# Patient Record
Sex: FEMALE | Race: WHITE | NOT HISPANIC OR LATINO | Employment: PART TIME | URBAN - METROPOLITAN AREA
[De-identification: names, ages, dates, MRNs, and addresses within clinical notes are randomized per-mention and may not be internally consistent; named-entity substitution may affect disease eponyms.]

---

## 2017-03-21 ENCOUNTER — GENERIC CONVERSION - ENCOUNTER (OUTPATIENT)
Dept: OTHER | Facility: OTHER | Age: 22
End: 2017-03-21

## 2017-03-21 ENCOUNTER — ALLSCRIPTS OFFICE VISIT (OUTPATIENT)
Dept: OTHER | Facility: OTHER | Age: 22
End: 2017-03-21

## 2017-03-23 LAB
A. VAGINALIS BY PCR (HISTORICAL): NOT DETECTED
A.VAGINALIS LOG (CELL/ML) (HISTORICAL): <3.25
BVAB 2 (HISTORICAL): NOT DETECTED
C. ALBICANS, DNA OR PCR (HISTORICAL): NOT DETECTED
CANDIDA GENUS (HISTORICAL): NOT DETECTED
GARDNERELLA BY MOL. METHOD (HISTORICAL): <3.25
GARDNERELLA BY MOL. METHOD (HISTORICAL): NOT DETECTED
LACTOBACILLUS (SPECIES) (HISTORICAL): DETECTED
LACTOBACILLUS (SPECIES) (HISTORICAL): NORMAL
MEGASPHAERA TYPE 1 (HISTORICAL): NOT DETECTED
TRICHOMONAS (HISTORICAL): NOT DETECTED

## 2017-04-21 ENCOUNTER — ALLSCRIPTS OFFICE VISIT (OUTPATIENT)
Dept: OTHER | Facility: OTHER | Age: 22
End: 2017-04-21

## 2017-05-10 ENCOUNTER — LAB REQUISITION (OUTPATIENT)
Dept: LAB | Facility: HOSPITAL | Age: 22
End: 2017-05-10
Payer: COMMERCIAL

## 2017-05-10 ENCOUNTER — GENERIC CONVERSION - ENCOUNTER (OUTPATIENT)
Dept: OTHER | Facility: OTHER | Age: 22
End: 2017-05-10

## 2017-05-10 ENCOUNTER — ALLSCRIPTS OFFICE VISIT (OUTPATIENT)
Dept: OTHER | Facility: OTHER | Age: 22
End: 2017-05-10

## 2017-05-10 DIAGNOSIS — R10.2 PELVIC AND PERINEAL PAIN: ICD-10-CM

## 2017-05-10 PROCEDURE — 87491 CHLMYD TRACH DNA AMP PROBE: CPT | Performed by: ADVANCED PRACTICE MIDWIFE

## 2017-05-10 PROCEDURE — 87591 N.GONORRHOEAE DNA AMP PROB: CPT | Performed by: ADVANCED PRACTICE MIDWIFE

## 2017-05-11 ENCOUNTER — LAB REQUISITION (OUTPATIENT)
Dept: LAB | Facility: HOSPITAL | Age: 22
End: 2017-05-11
Payer: COMMERCIAL

## 2017-05-11 ENCOUNTER — HOSPITAL ENCOUNTER (OUTPATIENT)
Dept: ULTRASOUND IMAGING | Facility: HOSPITAL | Age: 22
Discharge: HOME | End: 2017-05-11
Payer: COMMERCIAL

## 2017-05-11 DIAGNOSIS — R10.2 PELVIC AND PERINEAL PAIN: ICD-10-CM

## 2017-05-11 PROCEDURE — 76856 US EXAM PELVIC COMPLETE: CPT

## 2017-05-11 PROCEDURE — 87480 CANDIDA DNA DIR PROBE: CPT | Performed by: ADVANCED PRACTICE MIDWIFE

## 2017-05-11 PROCEDURE — 87510 GARDNER VAG DNA DIR PROBE: CPT | Performed by: ADVANCED PRACTICE MIDWIFE

## 2017-05-11 PROCEDURE — 87660 TRICHOMONAS VAGIN DIR PROBE: CPT | Performed by: ADVANCED PRACTICE MIDWIFE

## 2017-05-12 ENCOUNTER — GENERIC CONVERSION - ENCOUNTER (OUTPATIENT)
Dept: OTHER | Facility: OTHER | Age: 22
End: 2017-05-12

## 2017-05-13 LAB
CANDIDA RRNA VAG QL PROBE: NEGATIVE
G VAGINALIS RRNA GENITAL QL PROBE: NEGATIVE
T VAGINALIS RRNA GENITAL QL PROBE: NEGATIVE

## 2017-05-15 ENCOUNTER — GENERIC CONVERSION - ENCOUNTER (OUTPATIENT)
Dept: OTHER | Facility: OTHER | Age: 22
End: 2017-05-15

## 2017-05-17 ENCOUNTER — GENERIC CONVERSION - ENCOUNTER (OUTPATIENT)
Dept: OTHER | Facility: OTHER | Age: 22
End: 2017-05-17

## 2017-05-19 LAB
CHLAMYDIA DNA CVX QL NAA+PROBE: NORMAL
N GONORRHOEA DNA GENITAL QL NAA+PROBE: NORMAL

## 2018-01-11 NOTE — MISCELLANEOUS
Message   Recorded as Task   Date: 05/15/2017 03:37 PM, Created By: Alen Don   Task Name: Verify Patient Results   Assigned To:  Brianna Tobar   Regarding Patient: Celine Hamman, Status: Active   CommentCelesta Noel - 15 May 2017 3:37 PM        Brianna Tobar - 17 May 2017 2:51 PM     TASK EDITED  Card sent that ultrasound was normal        Signatures   Electronically signed by : Kathie Perez CNM; May 17 2017  2:51PM EST                       (Author)

## 2018-01-11 NOTE — RESULT NOTES
Message   Recorded as Task   Date: 05/15/2017 06:43 AM, Created By: Fernando Arnett   Task Name: Verify Patient Results   Assigned To: Brianna Tobar   Regarding Patient: Guadalupe Marquez, Status: Active   CommentAndredaniela Brody - 15 May 2017 6:43 AM        Brianna Tobar - 15 May 2017 1:54 PM     TASK EDITED  Card sent that cultures were negative          Signatures   Electronically signed by : Jam Joyner CNM; May 15 2017  1:54PM EST                       (Author)

## 2018-01-12 VITALS
BODY MASS INDEX: 15.95 KG/M2 | SYSTOLIC BLOOD PRESSURE: 118 MMHG | DIASTOLIC BLOOD PRESSURE: 62 MMHG | HEIGHT: 63 IN | WEIGHT: 90 LBS

## 2018-01-13 VITALS
SYSTOLIC BLOOD PRESSURE: 108 MMHG | DIASTOLIC BLOOD PRESSURE: 74 MMHG | HEIGHT: 63 IN | BODY MASS INDEX: 15.77 KG/M2 | WEIGHT: 89 LBS

## 2018-01-15 VITALS
HEIGHT: 63 IN | DIASTOLIC BLOOD PRESSURE: 66 MMHG | SYSTOLIC BLOOD PRESSURE: 90 MMHG | BODY MASS INDEX: 15.77 KG/M2 | WEIGHT: 89 LBS

## 2018-01-15 NOTE — RESULT NOTES
Message   Recorded as Task   Date: 05/20/2017 05:23 AM, Created By: Bret Diaz   Task Name: Verify Patient Results   Assigned To:  Brianna Tobar   Regarding Patient: Peter Ledesma, Status: Active   CommentCranston Franc - 20 May 2017 5:23 AM        Brianna Tobar - 22 May 2017 9:15 AM     TASK EDITED  Card sent the cultures were negative        Signatures   Electronically signed by : Tricia Arguello CNM; May 22 2017  9:15AM EST                       (Author)

## 2018-01-16 NOTE — RESULT NOTES
Message   Recorded as Task   Date: 03/23/2017 10:23 PM, Created By: Fatimah Holland   Task Name: Verify Patient Results   Assigned To: Brianna Tobar   Regarding Patient: Perez Mahmood, Status: Active   CommentEmelina Pradeep - 23 Mar 2017 10:23 PM        Brianna Tobar - 24 Mar 2017 8:33 AM     TASK EDITED  Card sent that cultures were negative          Signatures   Electronically signed by : Maddie Meade CNM; Mar 24 2017  8:33AM EST                       (Author)

## 2018-01-17 NOTE — MISCELLANEOUS
Message   Recorded as Task   Date: 05/10/2017 09:44 AM, Created By: Bela Goldstein   Task Name: Follow Up   Assigned To: Jose Hernandez   Regarding Patient: Christiana Leung, Status: In Progress   Jefryeitan Mckinney - 10 May 2017 9:44 AM     TASK CREATED  Caller: Self; [phone] (Home); [phone] (Work)  pt having a dull ache where ovaries are,said its been longer then 24 hrs,not due for period for 2 wks   call her at [phone]   [facility] - 10 May 2017 9:56 AM     TASK IN PROGRESS   Nita Garvey - 10 May 2017 10:04 AM     TASK EDITED  Spoke with pt - Was seen recently w/ J87 for BV  Was treated and is still experiencing some of the symptoms - itching and frequent urination  No discharge or odor  But she is now experiencing a dull ache in her ovaries  Started yesterday  Has not taken anything for it  Wanted an apt as soon as possible  Made ov for today  Active Problems    1  Bacterial vaginosis (616 10,041 9) (N76 0,B96 89)   2  Cough (786 2) (R05)   3  Vaginal discharge (623 5) (N89 8)   4  Vaginal itching (698 1) (L29 8)    Current Meds   1  Fluconazole 150 MG Oral Tablet; diflucan 150mg,,,take 1 today and repeat in 3 days; Therapy: [id number] to (Last Rx:21Mar2017)  Requested for: 21Mar2017 Ordered   2  MetroNIDAZOLE 0 75 % Vaginal Gel (MetroGel-Vaginal); INSERT 1 APPLICATORFUL   INTRAVAGINALLY AT BEDTIME NIGHTLY;    Therapy: [id number] to (Evaluate:26Apr2017)  Requested for: 21Apr2017; Last   Rx:21Apr2017 Ordered    Allergies    1  miconazole    Signatures   Electronically signed by : Patricia Carvajal, ; May 10 2017 10:05AM EST                       (Author)

## 2019-06-10 ENCOUNTER — OFFICE VISIT (OUTPATIENT)
Dept: URGENT CARE | Facility: CLINIC | Age: 24
End: 2019-06-10
Payer: COMMERCIAL

## 2019-06-10 VITALS
DIASTOLIC BLOOD PRESSURE: 60 MMHG | TEMPERATURE: 98.1 F | BODY MASS INDEX: 14.68 KG/M2 | HEART RATE: 77 BPM | RESPIRATION RATE: 18 BRPM | SYSTOLIC BLOOD PRESSURE: 110 MMHG | OXYGEN SATURATION: 97 % | WEIGHT: 86 LBS | HEIGHT: 64 IN
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
## 2019-06-10 DIAGNOSIS — S40.861A NONVENOMOUS BUG BITE OF SHOULDER OR UPPER ARM, INFECTED, RIGHT, INITIAL ENCOUNTER: Primary | ICD-10-CM

## 2019-06-10 DIAGNOSIS — L08.9 NONVENOMOUS BUG BITE OF SHOULDER OR UPPER ARM, INFECTED, RIGHT, INITIAL ENCOUNTER: Primary | ICD-10-CM

## 2019-06-10 DIAGNOSIS — S40.261A NONVENOMOUS BUG BITE OF SHOULDER OR UPPER ARM, INFECTED, RIGHT, INITIAL ENCOUNTER: Primary | ICD-10-CM

## 2019-06-10 PROCEDURE — 99213 OFFICE O/P EST LOW 20 MIN: CPT | Performed by: PHYSICIAN ASSISTANT

## 2019-06-10 PROCEDURE — S9088 SERVICES PROVIDED IN URGENT: HCPCS | Performed by: PHYSICIAN ASSISTANT

## 2019-06-10 RX ORDER — DIPHENHYDRAMINE HCL 25 MG
25 TABLET ORAL EVERY 6 HOURS PRN
COMMUNITY

## 2019-06-10 RX ORDER — PREDNISONE 20 MG/1
40 TABLET ORAL DAILY
Qty: 10 TABLET | Refills: 0 | Status: SHIPPED | OUTPATIENT
Start: 2019-06-10 | End: 2019-06-15

## 2019-06-10 RX ORDER — CEPHALEXIN 500 MG/1
500 CAPSULE ORAL EVERY 12 HOURS SCHEDULED
Qty: 14 CAPSULE | Refills: 0 | Status: SHIPPED | OUTPATIENT
Start: 2019-06-10 | End: 2019-06-17

## 2019-06-15 ENCOUNTER — HOSPITAL ENCOUNTER (EMERGENCY)
Facility: HOSPITAL | Age: 24
Discharge: HOME | End: 2019-06-16
Attending: EMERGENCY MEDICINE
Payer: COMMERCIAL

## 2019-06-15 DIAGNOSIS — J02.9 PHARYNGITIS: Primary | ICD-10-CM

## 2019-06-15 PROCEDURE — 99283 EMERGENCY DEPT VISIT LOW MDM: CPT | Performed by: EMERGENCY MEDICINE

## 2019-06-15 PROCEDURE — 99282 EMERGENCY DEPT VISIT SF MDM: CPT

## 2019-06-15 RX ORDER — LIDOCAINE HYDROCHLORIDE 20 MG/ML
10 SOLUTION OROPHARYNGEAL ONCE
Status: COMPLETED | OUTPATIENT
Start: 2019-06-16 | End: 2019-06-16

## 2019-06-15 RX ORDER — NAPROXEN 250 MG/1
500 TABLET ORAL ONCE
Status: COMPLETED | OUTPATIENT
Start: 2019-06-16 | End: 2019-06-16

## 2019-06-16 VITALS
DIASTOLIC BLOOD PRESSURE: 88 MMHG | HEIGHT: 64 IN | BODY MASS INDEX: 15.32 KG/M2 | RESPIRATION RATE: 17 BRPM | TEMPERATURE: 98.6 F | SYSTOLIC BLOOD PRESSURE: 137 MMHG | WEIGHT: 89.73 LBS | OXYGEN SATURATION: 97 % | HEART RATE: 84 BPM

## 2019-06-16 RX ADMIN — LIDOCAINE HYDROCHLORIDE 10 ML: 20 SOLUTION ORAL; TOPICAL at 00:22

## 2019-06-16 RX ADMIN — NAPROXEN 500 MG: 250 TABLET ORAL at 00:22

## 2019-12-27 ENCOUNTER — OFFICE VISIT (OUTPATIENT)
Dept: URGENT CARE | Facility: CLINIC | Age: 24
End: 2019-12-27
Payer: COMMERCIAL

## 2019-12-27 VITALS
DIASTOLIC BLOOD PRESSURE: 62 MMHG | TEMPERATURE: 98 F | BODY MASS INDEX: 15.36 KG/M2 | RESPIRATION RATE: 18 BRPM | HEART RATE: 61 BPM | OXYGEN SATURATION: 100 % | HEIGHT: 64 IN | SYSTOLIC BLOOD PRESSURE: 98 MMHG | WEIGHT: 90 LBS

## 2019-12-27 DIAGNOSIS — R21 RASH: Primary | ICD-10-CM

## 2019-12-27 PROCEDURE — 99213 OFFICE O/P EST LOW 20 MIN: CPT | Performed by: PHYSICIAN ASSISTANT

## 2019-12-27 PROCEDURE — S9088 SERVICES PROVIDED IN URGENT: HCPCS | Performed by: PHYSICIAN ASSISTANT

## 2019-12-27 RX ORDER — PREDNISONE 10 MG/1
TABLET ORAL
Qty: 26 TABLET | Refills: 0 | Status: SHIPPED | OUTPATIENT
Start: 2019-12-27

## 2019-12-27 NOTE — PROGRESS NOTES
[facility]        NAME: Aleja Matson is a 30 y o  female  : 1995    MRN: [id number]  DATE: 2019  TIME: 4:27 PM    Assessment and Plan   Rash [R21]  1  Rash  Ambulatory referral to Allergy    predniSONE 10 mg tablet     Zyrtec or Allegra daily as discussed    Patient Instructions     Follow up with PCP in 3-5 days  Proceed to  ER if symptoms worsen  Chief Complaint     Chief Complaint   Patient presents with    Rash     x 2 weeks; has been getting hives on and off  History of Present Illness       47-year-old female presents for evaluation of a rash  Patient complaining of hives neck coming go over the last 2 weeks  Patient is unable to relate the rash any new exposures  She describes the rash as hives and over her abdomen, trunk and on her face  She states that the rash does respond to Benadryl but then the rash returns  Denies difficulty breathing or shortness of breath  Denies fever chills  Review of Systems   Review of Systems   Constitutional: Negative for chills, fatigue and fever  HENT: Negative for congestion, ear pain, sinus pain, sore throat and trouble swallowing  Eyes: Negative for pain, discharge and redness  Respiratory: Negative for cough, chest tightness, shortness of breath and wheezing  Cardiovascular: Negative for chest pain, palpitations and leg swelling  Gastrointestinal: Negative for abdominal pain, diarrhea, nausea and vomiting  Musculoskeletal: Negative for arthralgias, joint swelling and myalgias  Skin: Positive for rash  Neurological: Negative for dizziness, weakness, numbness and headaches           Current Medications       Current Outpatient Medications:     diphenhydrAMINE (BENADRYL) 25 mg tablet, Take 25 mg by mouth every 6 (six) hours as needed for itching, Disp: , Rfl:     predniSONE 10 mg tablet, Take 3 tabs bid x 2 days, take 2 tabs bid x 2 days, take 1 tab bid x 2 days, take 1 tab daily x 2 days, Disp: 26 tablet, Rfl: 0    Current Allergies     Allergies as of 12/27/2019 - Reviewed 12/27/2019   Allergen Reaction Noted    Miconazole  03/21/2017            The following portions of the patient's history were reviewed and updated as appropriate: allergies, current medications, past family history, past medical history, past social history, past surgical history and problem list      History reviewed  No pertinent past medical history  History reviewed  No pertinent surgical history  Family History   Problem Relation Age of Onset    Diabetes Paternal Grandmother     Diabetes Paternal Grandfather          Medications have been verified  Objective   BP 98/62 (BP Location: Left arm, Patient Position: Sitting)   Pulse 61   Temp 98 °F (36 7 °C) (Temporal)   Resp 18   Ht 5' 4" (1 626 m)   Wt 40 8 kg (90 lb)   SpO2 100%   BMI 15 45 kg/m²        Physical Exam     Physical Exam   Constitutional: Vital signs are normal  She appears well-developed  No distress  Cardiovascular: Normal rate, regular rhythm, normal heart sounds and intact distal pulses  Pulmonary/Chest: Effort normal and breath sounds normal    Skin: Rash noted   Rash is urticarial

## 2020-01-27 ENCOUNTER — APPOINTMENT (OUTPATIENT)
Dept: LAB | Facility: CLINIC | Age: 25
End: 2020-01-27
Payer: COMMERCIAL

## 2020-01-27 ENCOUNTER — TRANSCRIBE ORDERS (OUTPATIENT)
Dept: LAB | Facility: CLINIC | Age: 25
End: 2020-01-27

## 2020-01-27 DIAGNOSIS — R76.8 FALSE POSITIVE SEROLOGICAL TEST FOR SYPHILIS: Primary | ICD-10-CM

## 2020-01-27 DIAGNOSIS — L50.1 URTICARIA, IDIOPATHIC: ICD-10-CM

## 2020-01-27 LAB
25(OH)D3 SERPL-MCNC: 13.2 NG/ML (ref 30–100)
ALBUMIN SERPL BCP-MCNC: 3.8 G/DL (ref 3.5–5)
ALP SERPL-CCNC: 65 U/L (ref 46–116)
ALT SERPL W P-5'-P-CCNC: 24 U/L (ref 12–78)
ANION GAP SERPL CALCULATED.3IONS-SCNC: 7 MMOL/L (ref 4–13)
AST SERPL W P-5'-P-CCNC: 15 U/L (ref 5–45)
BASOPHILS # BLD AUTO: 0.04 THOUSANDS/ΜL (ref 0–0.1)
BASOPHILS NFR BLD AUTO: 1 % (ref 0–1)
BILIRUB SERPL-MCNC: 0.43 MG/DL (ref 0.2–1)
BUN SERPL-MCNC: 9 MG/DL (ref 5–25)
C3 SERPL-MCNC: 88.2 MG/DL (ref 90–180)
C4 SERPL-MCNC: 19 MG/DL (ref 10–40)
CALCIUM SERPL-MCNC: 8.9 MG/DL (ref 8.3–10.1)
CHLORIDE SERPL-SCNC: 105 MMOL/L (ref 100–108)
CO2 SERPL-SCNC: 28 MMOL/L (ref 21–32)
CREAT SERPL-MCNC: 0.75 MG/DL (ref 0.6–1.3)
CRP SERPL QL: <3 MG/L
EOSINOPHIL # BLD AUTO: 0.07 THOUSAND/ΜL (ref 0–0.61)
EOSINOPHIL NFR BLD AUTO: 1 % (ref 0–6)
ERYTHROCYTE [DISTWIDTH] IN BLOOD BY AUTOMATED COUNT: 12.1 % (ref 11.6–15.1)
GFR SERPL CREATININE-BSD FRML MDRD: 110 ML/MIN/1.73SQ M
GLUCOSE SERPL-MCNC: 83 MG/DL (ref 65–140)
HCT VFR BLD AUTO: 38.3 % (ref 34.8–46.1)
HGB BLD-MCNC: 12.8 G/DL (ref 11.5–15.4)
IMM GRANULOCYTES # BLD AUTO: 0.03 THOUSAND/UL (ref 0–0.2)
IMM GRANULOCYTES NFR BLD AUTO: 0 % (ref 0–2)
LYMPHOCYTES # BLD AUTO: 2.09 THOUSANDS/ΜL (ref 0.6–4.47)
LYMPHOCYTES NFR BLD AUTO: 27 % (ref 14–44)
MCH RBC QN AUTO: 31.8 PG (ref 26.8–34.3)
MCHC RBC AUTO-ENTMCNC: 33.4 G/DL (ref 31.4–37.4)
MCV RBC AUTO: 95 FL (ref 82–98)
MONOCYTES # BLD AUTO: 0.7 THOUSAND/ΜL (ref 0.17–1.22)
MONOCYTES NFR BLD AUTO: 9 % (ref 4–12)
NEUTROPHILS # BLD AUTO: 4.75 THOUSANDS/ΜL (ref 1.85–7.62)
NEUTS SEG NFR BLD AUTO: 62 % (ref 43–75)
NRBC BLD AUTO-RTO: 0 /100 WBCS
PLATELET # BLD AUTO: 268 THOUSANDS/UL (ref 149–390)
PMV BLD AUTO: 10.1 FL (ref 8.9–12.7)
POTASSIUM SERPL-SCNC: 3.3 MMOL/L (ref 3.5–5.3)
PROT SERPL-MCNC: 6.9 G/DL (ref 6.4–8.2)
RBC # BLD AUTO: 4.02 MILLION/UL (ref 3.81–5.12)
SODIUM SERPL-SCNC: 140 MMOL/L (ref 136–145)
T3 SERPL-MCNC: 1.1 NG/ML (ref 0.6–1.8)
T4 SERPL-MCNC: 8.8 UG/DL (ref 4.7–13.3)
WBC # BLD AUTO: 7.68 THOUSAND/UL (ref 4.31–10.16)

## 2020-01-27 PROCEDURE — 86038 ANTINUCLEAR ANTIBODIES: CPT

## 2020-01-27 PROCEDURE — 84480 ASSAY TRIIODOTHYRONINE (T3): CPT

## 2020-01-27 PROCEDURE — 80053 COMPREHEN METABOLIC PANEL: CPT

## 2020-01-27 PROCEDURE — 86376 MICROSOMAL ANTIBODY EACH: CPT

## 2020-01-27 PROCEDURE — 86162 COMPLEMENT TOTAL (CH50): CPT

## 2020-01-27 PROCEDURE — 86039 ANTINUCLEAR ANTIBODIES (ANA): CPT

## 2020-01-27 PROCEDURE — 36415 COLL VENOUS BLD VENIPUNCTURE: CPT

## 2020-01-27 PROCEDURE — 84432 ASSAY OF THYROGLOBULIN: CPT

## 2020-01-27 PROCEDURE — 84436 ASSAY OF TOTAL THYROXINE: CPT

## 2020-01-27 PROCEDURE — 86800 THYROGLOBULIN ANTIBODY: CPT

## 2020-01-27 PROCEDURE — 84165 PROTEIN E-PHORESIS SERUM: CPT | Performed by: PATHOLOGY

## 2020-01-27 PROCEDURE — 86160 COMPLEMENT ANTIGEN: CPT

## 2020-01-27 PROCEDURE — 83520 IMMUNOASSAY QUANT NOS NONAB: CPT

## 2020-01-27 PROCEDURE — 82306 VITAMIN D 25 HYDROXY: CPT

## 2020-01-27 PROCEDURE — 86430 RHEUMATOID FACTOR TEST QUAL: CPT

## 2020-01-27 PROCEDURE — 85025 COMPLETE CBC W/AUTO DIFF WBC: CPT

## 2020-01-27 PROCEDURE — 86140 C-REACTIVE PROTEIN: CPT

## 2020-01-27 PROCEDURE — 84165 PROTEIN E-PHORESIS SERUM: CPT

## 2020-01-28 LAB
ALBUMIN SERPL ELPH-MCNC: 4.22 G/DL (ref 3.5–5)
ALBUMIN SERPL ELPH-MCNC: 63.9 % (ref 52–65)
ALPHA1 GLOB SERPL ELPH-MCNC: 0.26 G/DL (ref 0.1–0.4)
ALPHA1 GLOB SERPL ELPH-MCNC: 4 % (ref 2.5–5)
ALPHA2 GLOB SERPL ELPH-MCNC: 0.57 G/DL (ref 0.4–1.2)
ALPHA2 GLOB SERPL ELPH-MCNC: 8.7 % (ref 7–13)
BETA GLOB ABNORMAL SERPL ELPH-MCNC: 0.4 G/DL (ref 0.4–0.8)
BETA1 GLOB SERPL ELPH-MCNC: 6 % (ref 5–13)
BETA2 GLOB SERPL ELPH-MCNC: 4.7 % (ref 2–8)
BETA2+GAMMA GLOB SERPL ELPH-MCNC: 0.31 G/DL (ref 0.2–0.5)
GAMMA GLOB ABNORMAL SERPL ELPH-MCNC: 0.84 G/DL (ref 0.5–1.6)
GAMMA GLOB SERPL ELPH-MCNC: 12.7 % (ref 12–22)
IGG/ALB SER: 1.77 {RATIO} (ref 1.1–1.8)
PROT PATTERN SERPL ELPH-IMP: NORMAL
PROT SERPL-MCNC: 6.6 G/DL (ref 6.4–8.2)
RHEUMATOID FACT SER QL LA: NEGATIVE
THYROPEROXIDASE AB SERPL-ACNC: 6 IU/ML (ref 0–34)

## 2020-01-29 LAB
ANA HOMOGEN SER QL IF: NORMAL
ANA HOMOGEN TITR SER: NORMAL {TITER}
RYE IGE QN: POSITIVE
THYROGLOB AB SERPL-ACNC: <1 IU/ML (ref 0–0.9)
THYROGLOB SERPL-MCNC: 11.5 NG/ML (ref 1.5–38.5)

## 2020-01-30 LAB
CH50 SERPL-ACNC: 42 U/ML (ref 42–999999)
TRYPTASE SERPL-MCNC: 4.4 UG/L (ref 2.2–13.2)

## 2020-02-10 ENCOUNTER — APPOINTMENT (OUTPATIENT)
Dept: LAB | Facility: CLINIC | Age: 25
End: 2020-02-10
Payer: COMMERCIAL

## 2020-02-10 DIAGNOSIS — R76.8 FALSE POSITIVE SEROLOGICAL TEST FOR SYPHILIS: ICD-10-CM

## 2020-02-10 PROCEDURE — 86225 DNA ANTIBODY NATIVE: CPT

## 2020-02-10 PROCEDURE — 36415 COLL VENOUS BLD VENIPUNCTURE: CPT

## 2020-02-11 LAB — DSDNA AB SER-ACNC: 3 IU/ML (ref 0–9)

## 2021-12-27 ENCOUNTER — NURSE TRIAGE (OUTPATIENT)
Dept: OTHER | Facility: OTHER | Age: 26
End: 2021-12-27

## 2021-12-27 DIAGNOSIS — Z20.822 EXPOSURE TO COVID-19 VIRUS: Primary | ICD-10-CM

## 2022-11-17 ENCOUNTER — APPOINTMENT (OUTPATIENT)
Dept: RADIOLOGY | Facility: CLINIC | Age: 27
End: 2022-11-17

## 2022-11-17 ENCOUNTER — OFFICE VISIT (OUTPATIENT)
Dept: URGENT CARE | Facility: CLINIC | Age: 27
End: 2022-11-17

## 2022-11-17 VITALS
SYSTOLIC BLOOD PRESSURE: 140 MMHG | TEMPERATURE: 99.4 F | DIASTOLIC BLOOD PRESSURE: 98 MMHG | BODY MASS INDEX: 16.94 KG/M2 | WEIGHT: 99.2 LBS | HEART RATE: 86 BPM | HEIGHT: 64 IN | RESPIRATION RATE: 20 BRPM | OXYGEN SATURATION: 100 %

## 2022-11-17 DIAGNOSIS — M79.645 PAIN OF LEFT MIDDLE FINGER: ICD-10-CM

## 2022-11-17 DIAGNOSIS — S67.193A CRUSHING INJURY OF LEFT MIDDLE FINGER, INITIAL ENCOUNTER: ICD-10-CM

## 2022-11-17 DIAGNOSIS — S67.193A CRUSHING INJURY OF LEFT MIDDLE FINGER, INITIAL ENCOUNTER: Primary | ICD-10-CM

## 2022-11-17 DIAGNOSIS — S60.10XA SUBUNGUAL HEMATOMA OF DIGIT OF HAND, INITIAL ENCOUNTER: ICD-10-CM

## 2022-11-17 NOTE — PROGRESS NOTES
[facility]        NAME: Fausto Aguayo is a 32 y o  female  : 1995    MRN: [id number]  DATE: 2022  TIME: 7:09 PM    Assessment and Plan   Crushing injury of left middle finger, initial encounter [S67 193A]  1  Crushing injury of left middle finger, initial encounter  XR finger left third digit-middle    Ambulatory Referral to Orthopedic Surgery    CANCELED: XR hand 2 vw left      2  Pain of left middle finger  XR finger left third digit-middle    CANCELED: XR hand 2 vw left      3  Subungual hematoma of digit of hand, initial encounter              Patient Instructions     Ice 20 minutes 3-4 times per day for 3 days  Insulate the skin from the ice to prevent frostbite  Remove finger from splint every 2 hours and perform gentle stretches  Rest and Elevate  Follow up with orthopedic if symptoms do not improve  Follow up with PCP in 3-5 days  Proceed to ER if symptoms worsen  Chief Complaint     Chief Complaint   Patient presents with   • Hand Pain     Middle finger of left hand got caught last night on a sliding door  Patient feels possibly fractured digit or broken finger  Nail is completely blood clotted  History of Present Illness       Patient is a 73-year-old female presenting in the clinic today for left middle finger pain x1 day  Patient mentions crushing injury to the left middle finger last night  She describes the incident as opening a sliding door when the door got stuck and slid into her left middle finger  She states her left middle finger was crushed between sliding door and the door frame  Admits immediate pain and swelling  Denies lacerations or abrasions  Describes her left middle finger pain as an intermittent throbbing  Admits exacerbation with finger flexion  Denies fever, chills, SOB, chest pain  Denies the use of a splint, NSAIDs, Tylenol for symptom management         Review of Systems   Review of Systems   Constitutional: Negative for chills and fever  Respiratory: Negative for shortness of breath  Cardiovascular: Negative for chest pain  Musculoskeletal: Positive for joint swelling  Skin: Negative for rash and wound  Current Medications       Current Outpatient Medications:   •  diphenhydrAMINE (BENADRYL) 25 mg tablet, Take 25 mg by mouth every 6 (six) hours as needed for itching, Disp: , Rfl:   •  predniSONE 10 mg tablet, Take 3 tabs bid x 2 days, take 2 tabs bid x 2 days, take 1 tab bid x 2 days, take 1 tab daily x 2 days, Disp: 26 tablet, Rfl: 0    Current Allergies     Allergies as of 11/17/2022 - Reviewed 11/17/2022   Allergen Reaction Noted   • Miconazole  03/21/2017            The following portions of the patient's history were reviewed and updated as appropriate: allergies, current medications, past family history, past medical history, past social history, past surgical history and problem list      No past medical history on file  No past surgical history on file  Family History   Problem Relation Age of Onset   • Diabetes Paternal Grandmother    • Diabetes Paternal Grandfather          Medications have been verified  Objective   /98   Pulse 86   Temp 99 4 °F (37 4 °C)   Resp 20   Ht 5' 4" (1 626 m)   Wt 45 kg (99 lb 3 2 oz)   SpO2 100%   BMI 17 03 kg/m²          Physical Exam     Physical Exam  Vitals reviewed  Constitutional:       General: She is not in acute distress  Appearance: Normal appearance  She is normal weight  She is not ill-appearing  HENT:      Head: Normocephalic and atraumatic  Nose: Nose normal       Mouth/Throat:      Mouth: Mucous membranes are moist    Eyes:      Conjunctiva/sclera: Conjunctivae normal    Cardiovascular:      Rate and Rhythm: Normal rate and regular rhythm  Pulses: Normal pulses  Heart sounds: Normal heart sounds  No murmur heard  No friction rub  No gallop     Pulmonary:      Effort: Pulmonary effort is normal       Breath sounds: Normal breath sounds  No wheezing, rhonchi or rales  Musculoskeletal:         General: Swelling present  Right hand: Normal       Left hand: Swelling and tenderness (left middle DIP and PIP) present  Decreased range of motion (Left middle PIP)  Normal strength  Decreased sensation (Left middle DIP)  Comments: Ecchymosis and subungual hematoma noted of the left middle finger nail  Neurological:      Mental Status: She is alert  Psychiatric:         Mood and Affect: Mood normal          Behavior: Behavior normal        Splint application    Date/Time: 11/17/2022 6:35 PM  Performed by: Lisa Leyva PA-C  Authorized by: Lisa Leyva PA-C   [place] Protocol:  Consent: Verbal consent obtained  Risks and benefits: risks, benefits and alternatives were discussed  Consent given by: patient  Patient understanding: patient states understanding of the procedure being performed  Patient identity confirmed: verbally with patient      Pre-procedure details:     Sensation:  Normal  Procedure details:     Laterality:  Left    Location:  Finger    Finger:  L long finger    Splint type:  Finger splint, static  Post-procedure details:     Pain:  Unchanged    Sensation:  Normal    Patient tolerance of procedure: Tolerated well, no immediate complications    Nail removal    Date/Time: 11/17/2022 7:05 PM  Performed by: Lisa Leyva PA-C  Authorized by: Lisa Leyva PA-C     Patient location:  BedsideUniversal Protocol:  Consent: Verbal consent obtained    Risks and benefits: risks, benefits and alternatives were discussed  Consent given by: patient  Patient understanding: patient states understanding of the procedure being performed  Patient identity confirmed: verbally with patient      Location:     Hand:  R long finger  Pre-procedure details:     Skin preparation:  Alcohol and Betadine  Trephination:     Subungual hematoma drained: yes      Trephination instrument:  Cautery  Post-procedure details:     Dressing:  4x4 sterile gauze, antibiotic ointment and splint    Patient tolerance of procedure:   Tolerated well, no immediate complications

## 2022-11-17 NOTE — PATIENT INSTRUCTIONS
Ice 20 minutes 3-4 times per day for 3 days  Insulate the skin from the ice to prevent frostbite  Remove finger from splint every 2 hours and perform gentle stretches  Rest and Elevate  Follow up with orthopedic if symptoms do not improve  Follow up with PCP in 3-5 days  Proceed to ER if symptoms worsen

## 2022-11-18 ENCOUNTER — TELEPHONE (OUTPATIENT)
Dept: OBGYN CLINIC | Facility: OTHER | Age: 27
End: 2022-11-18

## 2022-11-18 ENCOUNTER — TELEPHONE (OUTPATIENT)
Dept: URGENT CARE | Facility: CLINIC | Age: 27
End: 2022-11-18

## 2022-11-18 NOTE — TELEPHONE ENCOUNTER
Patient is being referred to a orthopedics  Please schedule accordingly      [facility] [address]   [phone]

## 2022-11-18 NOTE — TELEPHONE ENCOUNTER
Spoke with patient and discussed left 3rd finger xray results  Instructed patient to continue the use of splint and ice to affected area   F/u with ortho hand as needed

## 2022-11-21 ENCOUNTER — OFFICE VISIT (OUTPATIENT)
Dept: OBGYN CLINIC | Facility: CLINIC | Age: 27
End: 2022-11-21

## 2022-11-21 VITALS
OXYGEN SATURATION: 99 % | SYSTOLIC BLOOD PRESSURE: 120 MMHG | BODY MASS INDEX: 17.31 KG/M2 | HEIGHT: 64 IN | DIASTOLIC BLOOD PRESSURE: 86 MMHG | WEIGHT: 101.4 LBS | HEART RATE: 117 BPM

## 2022-11-21 DIAGNOSIS — S60.10XA SUBUNGUAL HEMATOMA OF DIGIT OF HAND, INITIAL ENCOUNTER: Primary | ICD-10-CM

## 2022-11-21 DIAGNOSIS — S67.193A CRUSHING INJURY OF LEFT MIDDLE FINGER, INITIAL ENCOUNTER: ICD-10-CM

## 2022-11-21 NOTE — PROGRESS NOTES
ASSESSMENT/PLAN:    Assessment:   Subungual hematoma, left long finger  Trephinated by urgent care    Plan:   Stack splint as needed for activity    Follow Up:  PRN with [facility]    To Do Next Visit:       General Discussions:              _____________________________________________________  CHIEF COMPLAINT:  Chief Complaint   Patient presents with   • Left Middle Finger - Pain         SUBJECTIVE:  Rahel Will is a 32 y o  female who presents with Pain  Mild  Intermittant  Sharp of the left long finger  This started  2 day(s) ago: when she closed it in a sliding glass door  She reports the entire nail was black  Xrays done in urgent care were negative for fracture  The nail was successfully trephinated and is feeling better now       Radiation: None  Previous Treatments: bracing   Associated symptoms: None  Handedness: right      PAST MEDICAL HISTORY:  History reviewed  No pertinent past medical history  PAST SURGICAL HISTORY:  History reviewed  No pertinent surgical history  FAMILY HISTORY:  Family History   Problem Relation Age of Onset   • Diabetes Paternal Grandmother    • Diabetes Paternal Grandfather        SOCIAL HISTORY:  Social History     Tobacco Use   • Smoking status: Never   • Smokeless tobacco: Never   Vaping Use   • Vaping Use: Never used   Substance Use Topics   • Alcohol use: Not Currently   • Drug use: Never       MEDICATIONS:    Current Outpatient Medications:   •  diphenhydrAMINE (BENADRYL) 25 mg tablet, Take 25 mg by mouth every 6 (six) hours as needed for itching, Disp: , Rfl:   •  predniSONE 10 mg tablet, Take 3 tabs bid x 2 days, take 2 tabs bid x 2 days, take 1 tab bid x 2 days, take 1 tab daily x 2 days, Disp: 26 tablet, Rfl: 0    ALLERGIES:  Allergies   Allergen Reactions   • Miconazole        REVIEW OF SYSTEMS:  Pertinent items are noted in HPI  A comprehensive review of systems was negative      LABS:  HgA1c: No results found for: HGBA1C  BMP:   Lab Results   Component Value Date    CALCIUM 8 9 01/27/2020    K 3 3 (L) 01/27/2020    CO2 28 01/27/2020     01/27/2020    BUN 9 01/27/2020    CREATININE 0 75 01/27/2020         _____________________________________________________  PHYSICAL EXAMINATION:  Vital signs: /86   Pulse (!) 117   Ht 5' 4" (1 626 m)   Wt 46 kg (101 lb 6 4 oz)   SpO2 99%   BMI 17 41 kg/m²   General: well developed and well nourished, alert, oriented times 3 and appears comfortable  Psychiatric: Normal  HEENT: Trachea Midline, No torticollis  Cardiovascular: Regular rate and rhythm  Pulmonary: No audible wheezing   Abdomen: No guarding  Extremities: No lymphedema  Skin: No masses, erythema, lacerations, fluctation, ulcerations  Neurovascular: Sensation Intact to the Median, Ulnar, Radial Nerve, Motor Intact to the Median, Ulnar, Radial Nerve and Pulses Intact    MUSCULOSKELETAL EXAMINATION:  LEFT SIDE:  long finger  Hole from trephination noted  No longer with any subungual blood  No swelling   + tenderness of the distal tip  Full active ROM          _____________________________________________________  STUDIES REVIEWED:  xrays neg for fracture        PROCEDURES PERFORMED:  Procedures  No Procedures performed today

## 2023-08-10 ENCOUNTER — OFFICE VISIT (OUTPATIENT)
Dept: URGENT CARE | Facility: CLINIC | Age: 28
End: 2023-08-10
Payer: COMMERCIAL

## 2023-08-10 VITALS
RESPIRATION RATE: 14 BRPM | BODY MASS INDEX: 16.65 KG/M2 | HEART RATE: 80 BPM | DIASTOLIC BLOOD PRESSURE: 78 MMHG | SYSTOLIC BLOOD PRESSURE: 110 MMHG | TEMPERATURE: 98.8 F | WEIGHT: 97 LBS | OXYGEN SATURATION: 97 %

## 2023-08-10 DIAGNOSIS — N39.0 URINARY TRACT INFECTION WITH HEMATURIA, SITE UNSPECIFIED: ICD-10-CM

## 2023-08-10 DIAGNOSIS — R31.9 URINARY TRACT INFECTION WITH HEMATURIA, SITE UNSPECIFIED: ICD-10-CM

## 2023-08-10 DIAGNOSIS — R30.0 DYSURIA: Primary | ICD-10-CM

## 2023-08-10 LAB
SL AMB  POCT GLUCOSE, UA: ABNORMAL
SL AMB LEUKOCYTE ESTERASE,UA: ABNORMAL
SL AMB POCT BILIRUBIN,UA: ABNORMAL
SL AMB POCT BLOOD,UA: ABNORMAL
SL AMB POCT CLARITY,UA: ABNORMAL
SL AMB POCT COLOR,UA: ABNORMAL
SL AMB POCT KETONES,UA: ABNORMAL
SL AMB POCT NITRITE,UA: ABNORMAL
SL AMB POCT PH,UA: 6.5
SL AMB POCT SPECIFIC GRAVITY,UA: 1
SL AMB POCT URINE PROTEIN: 30
SL AMB POCT UROBILINOGEN: 0.2

## 2023-08-10 PROCEDURE — 87086 URINE CULTURE/COLONY COUNT: CPT

## 2023-08-10 PROCEDURE — 87077 CULTURE AEROBIC IDENTIFY: CPT

## 2023-08-10 PROCEDURE — 99213 OFFICE O/P EST LOW 20 MIN: CPT

## 2023-08-10 PROCEDURE — 81002 URINALYSIS NONAUTO W/O SCOPE: CPT

## 2023-08-10 PROCEDURE — S9088 SERVICES PROVIDED IN URGENT: HCPCS

## 2023-08-10 PROCEDURE — 87186 SC STD MICRODIL/AGAR DIL: CPT

## 2023-08-10 RX ORDER — FEXOFENADINE HCL 60 MG/1
60 TABLET, FILM COATED ORAL DAILY
COMMUNITY

## 2023-08-10 RX ORDER — NITROFURANTOIN 25; 75 MG/1; MG/1
100 CAPSULE ORAL 2 TIMES DAILY
Qty: 10 CAPSULE | Refills: 0 | Status: SHIPPED | OUTPATIENT
Start: 2023-08-10 | End: 2023-08-15

## 2023-08-10 RX ORDER — NITROFURANTOIN 25; 75 MG/1; MG/1
100 CAPSULE ORAL 2 TIMES DAILY
Qty: 10 CAPSULE | Refills: 0 | Status: SHIPPED | OUTPATIENT
Start: 2023-08-10 | End: 2023-08-10

## 2023-08-10 NOTE — PROGRESS NOTES
[facility]        NAME: Sylwia Hassan is a 25 y.o. female  : 1995    MRN: [id number]  DATE: August 10, 2023  TIME: 4:57 PM    Assessment and Plan   Dysuria [R30.0]  1. Dysuria  POCT urine dip    Urine culture      2. Urinary tract infection with hematuria, site unspecified  nitrofurantoin (MACROBID) 100 mg capsule    DISCONTINUED: nitrofurantoin (MACROBID) 100 mg capsule            Patient Instructions     Check my chart for urine culture results. Start antibiotic and take as directed. Take probiotic. Drink plenty of fluids, water or cranberry juice. Avoid caffeine and alcohol. Tylenol or Motrin for pain or fever. Azo for bladder spasms. Follow up with PCP in 3-5 days. If you develop fever, flank pain, vomiting, abdominal pain, or any new or concerning symptoms please return or proceed to ER. Chief Complaint     Chief Complaint   Patient presents with   • Possible UTI     Sx x1 day. Dysuria, frequency, urgency. Abd cramping. No fever or chills. History of Present Illness       The patient presents today with complaints of dysuria, urgency, and frequency that started last night. She denies fever/chills, n/v, flank/back, or abdominal pain. Her menses just finished on Tue. Review of Systems   Review of Systems   Constitutional: Negative for chills and fever. Gastrointestinal: Negative for abdominal pain, diarrhea, nausea and vomiting. Genitourinary: Positive for dysuria, frequency and urgency. Negative for difficulty urinating and flank pain.          Current Medications       Current Outpatient Medications:   •  fexofenadine (ALLEGRA) 60 MG tablet, Take 60 mg by mouth daily, Disp: , Rfl:   •  nitrofurantoin (MACROBID) 100 mg capsule, Take 1 capsule (100 mg total) by mouth 2 (two) times a day for 5 days, Disp: 10 capsule, Rfl: 0  •  diphenhydrAMINE (BENADRYL) 25 mg tablet, Take 25 mg by mouth every 6 (six) hours as needed for itching (Patient not taking: Reported on 8/10/2023), Disp: , Rfl:   •  predniSONE 10 mg tablet, Take 3 tabs bid x 2 days, take 2 tabs bid x 2 days, take 1 tab bid x 2 days, take 1 tab daily x 2 days, Disp: 26 tablet, Rfl: 0    Current Allergies     Allergies as of 08/10/2023 - Reviewed 08/10/2023   Allergen Reaction Noted   • Miconazole  03/21/2017            The following portions of the patient's history were reviewed and updated as appropriate: allergies, current medications, past family history, past medical history, past social history, past surgical history and problem list.     History reviewed. No pertinent past medical history. History reviewed. No pertinent surgical history. Family History   Problem Relation Age of Onset   • Diabetes Paternal Grandmother    • Diabetes Paternal Grandfather          Medications have been verified. Objective   /78   Pulse 80   Temp 98.8 °F (37.1 °C)   Resp 14   Wt 44 kg (97 lb)   SpO2 97%   BMI 16.65 kg/m²        Physical Exam     Physical Exam  Vitals and nursing note reviewed. Constitutional:       General: She is not in acute distress. Appearance: Normal appearance. She is not ill-appearing. HENT:      Head: Normocephalic and atraumatic. Right Ear: External ear normal.      Left Ear: External ear normal.      Nose: Nose normal.      Mouth/Throat:      Lips: Pink. Mouth: Mucous membranes are moist.   Eyes:      General: Vision grossly intact. Extraocular Movements: Extraocular movements intact. Pupils: Pupils are equal, round, and reactive to light. Cardiovascular:      Rate and Rhythm: Normal rate and regular rhythm. Heart sounds: Normal heart sounds. No murmur heard. Pulmonary:      Effort: Pulmonary effort is normal. No respiratory distress. Breath sounds: Normal breath sounds. No decreased air movement. No decreased breath sounds, wheezing, rhonchi or rales. Abdominal:      Tenderness: There is no abdominal tenderness.  There is no right CVA tenderness or left CVA tenderness. Musculoskeletal:         General: Normal range of motion. Cervical back: Normal range of motion. Skin:     General: Skin is warm. Findings: No rash. Neurological:      Mental Status: She is alert and oriented to person, place, and time. Motor: Motor function is intact. Gait: Gait is intact.    Psychiatric:         Attention and Perception: Attention normal.         Mood and Affect: Mood normal.

## 2023-08-10 NOTE — PATIENT INSTRUCTIONS
Check my chart for urine culture results. Start antibiotic and take as directed. Take probiotic. Drink plenty of fluids, water or cranberry juice. Avoid caffeine and alcohol. Tylenol or Motrin for pain or fever. Azo for bladder spasms. Follow up with PCP in 3-5 days. If you develop fever, flank pain, vomiting, abdominal pain, or any new or concerning symptoms please return or proceed to ER. Urinary Tract Infection in Women   WHAT YOU NEED TO KNOW:   A urinary tract infection (UTI) is caused by bacteria that get inside your urinary tract. Most bacteria that enter your urinary tract come out when you urinate. If the bacteria stay in your urinary tract, you may get an infection. Your urinary tract includes your kidneys, ureters, bladder, and urethra. Urine is made in your kidneys, and it flows from the ureters to the bladder. Urine leaves the bladder through the urethra. A UTI is more common in your lower urinary tract, which includes your bladder and urethra. DISCHARGE INSTRUCTIONS:   Return to the emergency department if:   You are urinating very little or not at all. You have a high fever with shaking chills. You have side or back pain that gets worse. Contact your healthcare provider if:   You have a fever. You do not feel better after 2 days of taking antibiotics. You are vomiting. You have questions or concerns about your condition or care. Medicines:   Antibiotics  help fight a bacterial infection. Medicines  may be given to decrease pain and burning when you urinate. They will also help decrease the feeling that you need to urinate often. These medicines will make your urine orange or red. Take your medicine as directed. Contact your healthcare provider if you think your medicine is not helping or if you have side effects. Tell him or her if you are allergic to any medicine. Keep a list of the medicines, vitamins, and herbs you take.  Include the amounts, and when and why you take them. Bring the list or the pill bottles to follow-up visits. Carry your medicine list with you in case of an emergency. Follow up with your healthcare provider as directed:  Write down your questions so you remember to ask them during your visits. Prevent another UTI:   Empty your bladder often. Urinate and empty your bladder as soon as you feel the need. Do not hold your urine for long periods of time. Wipe from front to back after you urinate or have a bowel movement. This will help prevent germs from getting into your urinary tract through your urethra. Drink liquids as directed. Ask how much liquid to drink each day and which liquids are best for you. You may need to drink more liquids than usual to help flush out the bacteria. Do not drink alcohol, caffeine, or citrus juices. These can irritate your bladder and increase your symptoms. Your healthcare provider may recommend cranberry juice to help prevent a UTI. Urinate after you have sex. This can help flush out bacteria passed during sex. Do not douche or use feminine deodorants. These can change the chemical balance in your vagina. Change sanitary pads or tampons often. This will help prevent germs from getting into your urinary tract. Do pelvic muscle exercises often. Pelvic muscle exercises may help you start and stop urinating. Strong pelvic muscles may help you empty your bladder easier. Squeeze these muscles tightly for 5 seconds like you are trying to hold back urine. Then relax for 5 seconds. Gradually work up to squeezing for 10 seconds. Do 3 sets of 15 repetitions a day, or as directed. © 2017 5 [facility] Information is for End User's use only and may not be sold, redistributed or otherwise used for commercial purposes. All illustrations and images included in CareNotes® are the copyrighted property of [employer] or Lance Ramos.   The above information is an  only. It is not intended as medical advice for individual conditions or treatments. Talk to your doctor, nurse or pharmacist before following any medical regimen to see if it is safe and effective for you.

## 2023-08-12 LAB — BACTERIA UR CULT: ABNORMAL

## 2023-08-16 ENCOUNTER — OFFICE VISIT (OUTPATIENT)
Dept: URGENT CARE | Facility: CLINIC | Age: 28
End: 2023-08-16
Payer: COMMERCIAL

## 2023-08-16 VITALS
DIASTOLIC BLOOD PRESSURE: 77 MMHG | TEMPERATURE: 98.4 F | RESPIRATION RATE: 18 BRPM | SYSTOLIC BLOOD PRESSURE: 129 MMHG | OXYGEN SATURATION: 98 % | HEART RATE: 84 BPM

## 2023-08-16 DIAGNOSIS — R30.0 DYSURIA: Primary | ICD-10-CM

## 2023-08-16 LAB
SL AMB  POCT GLUCOSE, UA: NEGATIVE
SL AMB LEUKOCYTE ESTERASE,UA: NEGATIVE
SL AMB POCT BILIRUBIN,UA: NEGATIVE
SL AMB POCT BLOOD,UA: NEGATIVE
SL AMB POCT CLARITY,UA: CLEAR
SL AMB POCT COLOR,UA: YELLOW
SL AMB POCT KETONES,UA: NEGATIVE
SL AMB POCT NITRITE,UA: NEGATIVE
SL AMB POCT PH,UA: 6.5
SL AMB POCT SPECIFIC GRAVITY,UA: 1
SL AMB POCT URINE PROTEIN: NEGATIVE
SL AMB POCT UROBILINOGEN: 0.2

## 2023-08-16 PROCEDURE — 87086 URINE CULTURE/COLONY COUNT: CPT | Performed by: PHYSICIAN ASSISTANT

## 2023-08-16 PROCEDURE — 99213 OFFICE O/P EST LOW 20 MIN: CPT | Performed by: PHYSICIAN ASSISTANT

## 2023-08-16 PROCEDURE — 81002 URINALYSIS NONAUTO W/O SCOPE: CPT | Performed by: PHYSICIAN ASSISTANT

## 2023-08-16 PROCEDURE — S9088 SERVICES PROVIDED IN URGENT: HCPCS | Performed by: PHYSICIAN ASSISTANT

## 2023-08-16 NOTE — PROGRESS NOTES
[facility]        NAME: Gerald Jeffrey is a 25 y.o. female  : 1995    MRN: [id number]  DATE: 2023  TIME: 5:49 PM    Assessment and Plan   Dysuria [R30.0]  1. Dysuria  Urine culture    POCT urine dip            Patient Instructions   Discussed with patient that since her UA was normal and she just finished the Macrobid yesterday we will hold off on prescribing further antibiotics until urinary culture comes back. Should patient develop flank pain, fevers, vomiting she should report to the ER. Follow up with PCP in 3-5 days. Proceed to  ER if symptoms worsen. Chief Complaint     Chief Complaint   Patient presents with   • uti symptoms     Patient was here 8/10 with UTI symptoms, finished Macrobid yesterday morning. Still have frequent urination and mild burning with urination. History of Present Illness       HPI  This is 25year old female here c/o urinary frequency. Patient was seen at this clinic on 810 for UTI symptoms and treated with Macrobid. She finished the last dose of her Macrobid yesterday morning. She notes she continues to have urinary frequency and mild burning at the end of urination. She denies fever, chills, nausea, vomiting, diarrhea, shortness of breath, chest pain, belly pain back pain or any vaginal symptoms. Review of Systems   Review of Systems   Constitutional: Negative for chills and fever. Respiratory: Negative for shortness of breath. Cardiovascular: Negative for chest pain. Gastrointestinal: Negative for abdominal pain, diarrhea, nausea and vomiting. Genitourinary: Positive for dysuria, frequency and urgency. Negative for hematuria. Musculoskeletal: Negative for back pain.          Current Medications       Current Outpatient Medications:   •  diphenhydrAMINE (BENADRYL) 25 mg tablet, Take 25 mg by mouth every 6 (six) hours as needed for itching, Disp: , Rfl:   •  fexofenadine (ALLEGRA) 60 MG tablet, Take 60 mg by mouth daily, Disp: , Rfl:   •  predniSONE 10 mg tablet, Take 3 tabs bid x 2 days, take 2 tabs bid x 2 days, take 1 tab bid x 2 days, take 1 tab daily x 2 days (Patient not taking: Reported on 8/16/2023), Disp: 26 tablet, Rfl: 0    Current Allergies     Allergies as of 08/16/2023 - Reviewed 08/16/2023   Allergen Reaction Noted   • Miconazole  03/21/2017            The following portions of the patient's history were reviewed and updated as appropriate: allergies, current medications, past family history, past medical history, past social history, past surgical history and problem list.     History reviewed. No pertinent past medical history. History reviewed. No pertinent surgical history. Family History   Problem Relation Age of Onset   • Diabetes Paternal Grandmother    • Diabetes Paternal Grandfather          Medications have been verified. Objective   /77   Pulse 84   Temp 98.4 °F (36.9 °C)   Resp 18   LMP 08/02/2023 (Approximate)   SpO2 98%        Physical Exam     Physical Exam  Vitals and nursing note reviewed. Constitutional:       Appearance: Normal appearance. She is normal weight. Cardiovascular:      Rate and Rhythm: Normal rate and regular rhythm. Pulmonary:      Effort: Pulmonary effort is normal.      Breath sounds: Normal breath sounds. Abdominal:      General: Abdomen is flat. Bowel sounds are normal. There is no distension. Palpations: Abdomen is soft. Tenderness: There is no abdominal tenderness. There is no right CVA tenderness, left CVA tenderness or guarding. Neurological:      Mental Status: She is alert.

## 2023-08-18 LAB — BACTERIA UR CULT: NORMAL

## 2023-12-14 ENCOUNTER — VBI (OUTPATIENT)
Dept: ADMINISTRATIVE | Facility: OTHER | Age: 28
End: 2023-12-14

## 2024-02-27 ENCOUNTER — OFFICE VISIT (OUTPATIENT)
Dept: URGENT CARE | Facility: CLINIC | Age: 29
End: 2024-02-27
Payer: COMMERCIAL

## 2024-02-27 VITALS
OXYGEN SATURATION: 100 % | SYSTOLIC BLOOD PRESSURE: 131 MMHG | HEART RATE: 90 BPM | TEMPERATURE: 98.5 F | DIASTOLIC BLOOD PRESSURE: 82 MMHG

## 2024-02-27 DIAGNOSIS — M25.511 ACUTE PAIN OF RIGHT SHOULDER: Primary | ICD-10-CM

## 2024-02-27 PROCEDURE — 99213 OFFICE O/P EST LOW 20 MIN: CPT

## 2024-02-27 PROCEDURE — S9088 SERVICES PROVIDED IN URGENT: HCPCS

## 2024-02-27 NOTE — PROGRESS NOTES
[facility]        NAME: Dmitri Sharma is a 28 y.o. female  : 1995    MRN: [id number]  DATE: 2024  TIME: 3:22 PM    Assessment and Plan   Acute pain of right shoulder [M25.511]  1. Acute pain of right shoulder  Ambulatory Referral to Orthopedic Surgery            Patient Instructions     Rest injured extremity  Heat and/or ice for 20 minutes at a time to site of injury   Ibuprofen and Tylenol for pain as needed     Recommend further evaluation by Orthopedics   Follow up with PCP in 3-5 days   Proceed to ER if worsening symptoms       Chief Complaint     Chief Complaint   Patient presents with    Shoulder Pain     Pt has right shoulder pain for 2 weeks. Pt said that the past week is worse than the 1st week. Pt feels like her right arm tires out really quick and feels a frequent pop in her rights shoulder. At a certain point of lifting the arm pt can feel pain. Pt also mentioned that even when not moving her shoulder there is a ache. Pt has taken Aleve.          History of Present Illness       Patient reports 2 week history of right shoulder pain. Notes that it has been worse this week than the week previous. Denies acute trauma to the area but reports repetitive motion at work as a  makes the pain worse. Notes that by the end of the day she experiences weakness and tingling in the arm associated with the pain.  The pain has made it harder to sleep and has woken her up at night. Reports having pain with too much motion and when at rest for prolonged periods. Also reports a popping sensation with overhead range of motion.     Shoulder Pain   The pain is present in the right shoulder. This is a new problem. The current episode started 1 to 4 weeks ago. The pain is moderate. Associated symptoms include joint locking (after popping able to move again), stiffness and tingling (what using). Pertinent negatives include no fever, joint swelling, limited range of motion or numbness.  The symptoms are aggravated by activity. She has tried cold and NSAIDS for the symptoms. The treatment provided mild relief. There is no history of diabetes, gout or osteoarthritis.       Review of Systems   Review of Systems   Constitutional:  Negative for chills and fever.   HENT:  Negative for congestion, postnasal drip, rhinorrhea, sinus pressure, sore throat and trouble swallowing.    Respiratory:  Negative for cough, chest tightness and shortness of breath.    Cardiovascular:  Negative for chest pain and palpitations.   Gastrointestinal:  Negative for abdominal pain, nausea and vomiting.   Genitourinary:  Negative for difficulty urinating.   Musculoskeletal:  Positive for arthralgias (right shoulder) and stiffness. Negative for gout, joint swelling, myalgias and neck stiffness.   Skin:  Negative for rash and wound.   Neurological:  Positive for tingling (what using). Negative for dizziness, numbness and headaches.         Current Medications       Current Outpatient Medications:     diphenhydrAMINE (BENADRYL) 25 mg tablet, Take 25 mg by mouth every 6 (six) hours as needed for itching, Disp: , Rfl:     fexofenadine (ALLEGRA) 60 MG tablet, Take 60 mg by mouth daily, Disp: , Rfl:     predniSONE 10 mg tablet, Take 3 tabs bid x 2 days, take 2 tabs bid x 2 days, take 1 tab bid x 2 days, take 1 tab daily x 2 days (Patient not taking: Reported on 8/16/2023), Disp: 26 tablet, Rfl: 0    Current Allergies     Allergies as of 02/27/2024 - Reviewed 02/27/2024   Allergen Reaction Noted    Miconazole  03/21/2017            The following portions of the patient's history were reviewed and updated as appropriate: allergies, current medications, past family history, past medical history, past social history, past surgical history and problem list.     History reviewed. No pertinent past medical history.    History reviewed. No pertinent surgical history.    Family History   Problem Relation Age of Onset    Diabetes Paternal  Grandmother     Diabetes Paternal Grandfather          Medications have been verified.        Objective   /82 (BP Location: Left arm)   Pulse 90   Temp 98.5 °F (36.9 °C)   SpO2 100%        Physical Exam     Physical Exam  Constitutional:       General: She is not in acute distress.     Appearance: She is not ill-appearing.   HENT:      Nose: Nose normal.      Mouth/Throat:      Mouth: Mucous membranes are moist.      Pharynx: Oropharynx is clear.   Cardiovascular:      Rate and Rhythm: Normal rate and regular rhythm.      Pulses: Normal pulses.      Heart sounds: Normal heart sounds.   Pulmonary:      Effort: Pulmonary effort is normal.      Breath sounds: Normal breath sounds.   Musculoskeletal:      Right shoulder: Tenderness (subacromium space) present. No swelling, deformity, bony tenderness or crepitus. Decreased range of motion (Limited due to pain). Normal strength. Normal pulse.      Left shoulder: Normal.      Cervical back: Normal range of motion.      Comments: Popping sensation with overhead motion, pain and weakness with empty can   Skin:     General: Skin is warm and dry.      Capillary Refill: Capillary refill takes less than 2 seconds.   Neurological:      Mental Status: She is alert and oriented to person, place, and time.

## 2024-02-28 ENCOUNTER — APPOINTMENT (OUTPATIENT)
Dept: RADIOLOGY | Facility: CLINIC | Age: 29
End: 2024-02-28
Payer: COMMERCIAL

## 2024-02-28 ENCOUNTER — OFFICE VISIT (OUTPATIENT)
Dept: OBGYN CLINIC | Facility: CLINIC | Age: 29
End: 2024-02-28
Payer: COMMERCIAL

## 2024-02-28 VITALS
DIASTOLIC BLOOD PRESSURE: 92 MMHG | BODY MASS INDEX: 17.42 KG/M2 | HEIGHT: 64 IN | SYSTOLIC BLOOD PRESSURE: 136 MMHG | HEART RATE: 77 BPM | WEIGHT: 102 LBS

## 2024-02-28 DIAGNOSIS — M25.511 RIGHT SHOULDER PAIN, UNSPECIFIED CHRONICITY: ICD-10-CM

## 2024-02-28 DIAGNOSIS — M25.511 ACUTE PAIN OF RIGHT SHOULDER: ICD-10-CM

## 2024-02-28 DIAGNOSIS — M75.41 IMPINGEMENT SYNDROME OF RIGHT SHOULDER: Primary | ICD-10-CM

## 2024-02-28 DIAGNOSIS — M25.311 DYSKINESIS OF RIGHT SCAPULA: ICD-10-CM

## 2024-02-28 PROCEDURE — 99214 OFFICE O/P EST MOD 30 MIN: CPT | Performed by: STUDENT IN AN ORGANIZED HEALTH CARE EDUCATION/TRAINING PROGRAM

## 2024-02-28 PROCEDURE — 73030 X-RAY EXAM OF SHOULDER: CPT

## 2024-02-28 RX ORDER — MELOXICAM 15 MG/1
15 TABLET ORAL DAILY
Qty: 30 TABLET | Refills: 0 | Status: SHIPPED | OUTPATIENT
Start: 2024-02-28

## 2024-02-28 NOTE — PROGRESS NOTES
Ortho Sports Medicine Shoulder New Patient Visit     Assesment:   28 y.o. female with right shoulder pain ongoing for 2 weeks without any known injury. Exam consistent with impingement syndrome and scapular dyskinesia.     Plan:  I reviewed the history, exam, and imaging with the patient in clinic today.  I did review the results of her x-rays which showed no fracture, dislocation or other osseous abnormality.  On exam, she has full range of motion, no instability, and good rotator cuff strength.  She does have signs of impingement as well as tenderness over the coracoid process suggesting scapular dyskinesia and poor posture.  The patient's pain was slightly improved with correction of her posture and scapular position.  I discussed potential treatment options with the patient focusing on conservative management at this point.  I recommended starting with physical therapy and anti-inflammatory medications.  I did discuss that if her symptoms persist after 6 weeks of this treatment that we could consider an MRI for further evaluation.  I also discussed the possibility of a corticosteroid injection if her symptoms do not improve.  Patient demonstrated understanding discussion was agreed with the plan.  All of her questions were answered.  I provided her with prescription physical therapy as well as a prescription for meloxicam.  I told her not to take any other anti-inflammatories while meloxicam and to stop if she develops any GI symptoms.  She can follow-up in 6 weeks for repeat evaluation.  She can reach out to clinic with any questions or concerns anytime.    Conservative treatment:  Ice to shoulder 1-2 times daily, for 20 minutes at a time.  PT for ROM and strengthening to shoulder, rotator cuff, scapular stabilizers.  Let pain guide return to activities.  Prescription NSAIDs for pain (meloxicam - in place of any OTC NSAIDs).    Imaging:  All imaging from today was reviewed by myself and explained to the  patient. May consider MRI at the follow up if pain persists.    Injection:  No Injection planned at this time.    Surgery:   No surgery is recommended at this point, continue with conservative treatment plan as noted.    Follow up:  Return in about 6 weeks (around 4/10/2024).      Chief Complaint   Patient presents with    Right Shoulder - Pain         History of Present Illness:  The patient is a 28 y.o. female seen in clinic for right shoulder pain. The pain started 2 weeks ago. 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 The pain is now located anteriorly greater than laterally and is associated with weakness and popping. Pain does radiate down the arm with overuse. The patient characterizes the intensity of pain as a 2 out of 10 today. The patient states that the pain is interfering with her sleep.  Symptoms are aggravated by overuse. The patient has tried rest, ice and NSAIDs. Symptoms have worsened since the onset. Patient has no history of prior injury, surgery.    Occupation: [employer]    The patient has the following co-morbidities: n/a    Shoulder Surgical History:  None    Past Medical, Social and Family History:  History reviewed. No pertinent past medical history.  History reviewed. No pertinent surgical history.  Allergies   Allergen Reactions    Miconazole      Current Outpatient Medications on File Prior to Visit   Medication Sig Dispense Refill    diphenhydrAMINE (BENADRYL) 25 mg tablet Take 25 mg by mouth every 6 (six) hours as needed for itching      fexofenadine (ALLEGRA) 60 MG tablet Take 60 mg by mouth daily      predniSONE 10 mg tablet Take 3 tabs bid x 2 days, take 2 tabs bid x 2 days, take 1 tab bid x 2 days, take 1 tab daily x 2 days (Patient not taking: Reported on 8/16/2023) 26 tablet 0     No current facility-administered medications on file prior to visit.     Social History     Socioeconomic History    Marital  "status: /Civil Union     Spouse name: Not on file    Number of children: Not on file    Years of education: Not on file    Highest education level: Not on file   Occupational History    Not on file   Tobacco Use    Smoking status: Never     Passive exposure: Never    Smokeless tobacco: Never   Vaping Use    Vaping status: Never Used   Substance and Sexual Activity    Alcohol use: Yes     Comment: rare    Drug use: Never    Sexual activity: Not Currently   Other Topics Concern    Not on file   Social History Narrative    Not on file     Social Determinants of Health     Financial Resource Strain: Not on file   Food Insecurity: Not on file   Transportation Needs: Not on file   Physical Activity: Not on file   Stress: Not on file   Social Connections: Not on file   Intimate Partner Violence: Not on file   Housing Stability: Not on file       I have reviewed the past medical, surgical, social and family history, medications and allergies as documented in the EMR.    Review of systems: ROS is negative other than that noted in the HPI.  Constitutional: Negative for fatigue and fever.      Physical Exam:    Blood pressure 136/92, pulse 77, height 5' 4\" (1.626 m), weight 46.3 kg (102 lb).    General/Constitutional: NAD, well developed, well nourished  HENT: Normocephalic, atraumatic  CV: Intact distal pulses, regular rate  Resp: No respiratory distress or labored breathing  Neuro: Alert and Oriented x 3, no focal deficits  Psych: Normal mood, normal affect, normal judgement, normal behavior  Skin: Warm, dry, no rashes, no erythema      Focused right shoulder exam:  No paracervical tenderness.   No cervical tenderness.   No pain with neck flexion, extension, side-to-side bending, or rotation.     The skin is intact without evidence of erythema or ecchymosis. Symmetric shoulders with no evidence of supraspinatus or infraspinatus muscle atrophy. There is no evidence neurologic medial or lateral scapular winging. " Anterior tilt noted with scapular positioning.    Palpation demonstrates tenderness mildly over the AC joint and coracoid but no tenderness over the SC joint, bilateral clavicle, lateral aspect of the acromion or posterior joint line, or bicipital groove.    Shoulder ROM demonstrates 170 degrees of active passive forward elevation with pain. Pain improves with scapular stabilization during forward elevation. External rotation with the arms at the side demonstrates 80 degree of active of passive motion.  Internal rotation is to T8.        Strength testing demonstrates 5/5 strength in empty can position (with pain), 5/5 with resisted external rotation with the arm at the side.  5/5 strength of the subscapularis and a negative belly press.  Pain with resisted elbow flexion.     Provocative testing for the following demonstrate-  Impingement- negative Hawkin's impingement test, positive Neer impingement sign.  Biceps- negative Yeagerson, negative Speeds test.  Labrum- equivocal Sims test, negative sulcus sign    UE NV Exam: +2 Radial pulses bilaterally. Fingers are warm and well-perfused.  Sensation intact to light touch C5-T1 bilaterally, Radial/median/ulnar nerve motor intact      Shoulder Imaging    Radiographs of the right shoulder were obtained on 2/28/24 and reviewed with the patient.  Based on my independent evaluation, the imaging shows no acute osseous abnormalities..      Scribe Attestation      I,:  Chrystal Msat PA-C am acting as a scribe while in the presence of the attending physician.:       I,:  Zhen Wagner MD personally performed the services described in this documentation    as scribed in my presence.:

## 2024-03-14 ENCOUNTER — EVALUATION (OUTPATIENT)
Dept: PHYSICAL THERAPY | Facility: CLINIC | Age: 29
End: 2024-03-14
Payer: COMMERCIAL

## 2024-03-14 DIAGNOSIS — M75.41 IMPINGEMENT SYNDROME OF RIGHT SHOULDER: Primary | ICD-10-CM

## 2024-03-14 DIAGNOSIS — M25.311 DYSKINESIS OF RIGHT SCAPULA: ICD-10-CM

## 2024-03-14 DIAGNOSIS — M25.511 RIGHT SHOULDER PAIN, UNSPECIFIED CHRONICITY: ICD-10-CM

## 2024-03-14 PROCEDURE — 97110 THERAPEUTIC EXERCISES: CPT

## 2024-03-14 PROCEDURE — 97161 PT EVAL LOW COMPLEX 20 MIN: CPT
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
## 2024-03-19 ENCOUNTER — OFFICE VISIT (OUTPATIENT)
Dept: PHYSICAL THERAPY | Facility: CLINIC | Age: 29
End: 2024-03-19
Payer: COMMERCIAL

## 2024-03-19 DIAGNOSIS — M25.311 DYSKINESIS OF RIGHT SCAPULA: ICD-10-CM

## 2024-03-19 DIAGNOSIS — M25.511 RIGHT SHOULDER PAIN, UNSPECIFIED CHRONICITY: ICD-10-CM

## 2024-03-19 DIAGNOSIS — M75.41 IMPINGEMENT SYNDROME OF RIGHT SHOULDER: Primary | ICD-10-CM

## 2024-03-19 PROCEDURE — 97110 THERAPEUTIC EXERCISES: CPT

## 2024-03-19 PROCEDURE — 97112 NEUROMUSCULAR REEDUCATION: CPT

## 2024-03-19 NOTE — PROGRESS NOTES
"Daily Note    Today's date: 24  Patient name: Dmitri Sharma  : 1995  MRN: [id number]  Referring provider: Chrystal Mast PA-C  Dx:   Encounter Diagnosis     ICD-10-CM    1. Impingement syndrome of right shoulder  M75.41       2. Right shoulder pain, unspecified chronicity  M25.511       3. Dyskinesis of right scapula  M25.311           Start Time: 1515  Stop Time: 1600  Total time in clinic (min): 45 minutes      Subjective: Dmitri reports adherence to HEP and feels that it is a tiny bit better. She notes that she is still overdoing it at work.    Objective: See treatment diary below.    Assessment: Dmitri tolerated treatment well with consistent cuing throughout. TE's were performed with increased reps and increased resistance. New TE's were demonstrated with proper technique, and tolerated well. Following treatment, the patient demonstrated fatigue post treatment, exhibited good technique with therapeutic exercises, and would benefit from continued PT.     Plan: Continue per plan of care.        Precautions: Standard     POC expires Unit limit Auth  expiration date PT/OT + Visit Limit?   24 BOMN 23                  Visit/Unit Tracking  AUTH Status:  Date 3/14 3/19             Approved Used 1 2              Remaining                  Access Code: [email or web address]  URL: [email or web address]  Date: 2024  Prepared by: Peter Marx    Exercises  - Standing Shoulder External Rotation with Resistance  - 1 x daily - 7 x weekly - 3 sets - 10 reps  - Standing Shoulder Row with Anchored Resistance  - 1 x daily - 7 x weekly - 3 sets - 10 reps  - Ulnar Nerve Flossing  - 1 x daily - 7 x weekly - 3 sets - 10 reps  - Standing Scapular Protraction Retraction with Hand on Wall  - 1 x daily - 7 x weekly - 3 sets - 10 reps    Manuals 3/14 3/19           Shoulder PROM                                       Reassessment             Neuro Re-Ed             Iso scap ret. 20x5\"  20x5\"          " "  Prone I's  20x5\"            Prone T's  20x5\"            Prone Y's             Prone Rows   20x5\"            D2 flexion with TB              Carlin M, L YTB 2x10  RTB 2x10 3\"           Ther Ex             UBE for posture/cardio  3'/3'            Pulleys              Wall slides - flex             Table slides - flex  20x5\"            Table slides - abduction  20x5\"            Supine AROM flexion             Doorway chest stretch  10x10\"            TB ER             TB IR             TB BL ER 20x5\" YTB                         scaption wall slides with TB                           TB horizontal abd pull out with press              Median nerve glides on wall 20x            Ulnar nerve glides 20x                         Ther Activity                                       Gait Training                                       Modalities                                               Peter Marx, PT  3/19/2024,4:29 PM  "

## 2024-03-21 ENCOUNTER — OFFICE VISIT (OUTPATIENT)
Dept: PHYSICAL THERAPY | Facility: CLINIC | Age: 29
End: 2024-03-21
Payer: COMMERCIAL

## 2024-03-21 DIAGNOSIS — M25.511 RIGHT SHOULDER PAIN, UNSPECIFIED CHRONICITY: ICD-10-CM

## 2024-03-21 DIAGNOSIS — M25.311 DYSKINESIS OF RIGHT SCAPULA: ICD-10-CM

## 2024-03-21 DIAGNOSIS — M75.41 IMPINGEMENT SYNDROME OF RIGHT SHOULDER: Primary | ICD-10-CM

## 2024-03-21 PROCEDURE — 97112 NEUROMUSCULAR REEDUCATION: CPT

## 2024-03-21 PROCEDURE — 97110 THERAPEUTIC EXERCISES: CPT

## 2024-03-21 PROCEDURE — 97140 MANUAL THERAPY 1/> REGIONS: CPT

## 2024-03-21 NOTE — PROGRESS NOTES
Daily Note     Today's date: 3/21/2024  Patient name: Dmitri Sharma  : 1995  MRN: [id number]  Referring provider: Chrystal Mast PA-C  Dx:   Encounter Diagnosis     ICD-10-CM    1. Impingement syndrome of right shoulder  M75.41       2. Right shoulder pain, unspecified chronicity  M25.511       3. Dyskinesis of right scapula  M25.311           Start Time: 1715  Stop Time: 1753  Total time in clinic (min): 38 minutes    Subjective: Pt noted no changes since last visit. Noted that her R shoulder is a little more sore today.       Objective: See treatment diary below      Assessment: secondary to increase soreness upon arrival of this visit modifications made as needed. Primarily focus on ROM this visit.  T/o ROM exercises noted no immediate changes in pain status. Tolerated treatment well. Patient exhibited good technique with therapeutic exercises and would benefit from continued PT. S/p treatment session patient reported having less pain and feeling better than upon arrival.       Plan: Continue per plan of care.      Precautions: Standard     POC expires Unit limit Auth  expiration date PT/OT + Visit Limit?   24 BOMN 23                  Visit/Unit Tracking  AUTH Status:  Date 3/14 3/19 3/21            Approved 12  Used 1 2 3             Remaining  11 10 9               Access Code: [email or web address]  URL: [email or web address]  Date: 2024  Prepared by: Peter Marx    Exercises  - Standing Shoulder External Rotation with Resistance  - 1 x daily - 7 x weekly - 3 sets - 10 reps  - Standing Shoulder Row with Anchored Resistance  - 1 x daily - 7 x weekly - 3 sets - 10 reps  - Ulnar Nerve Flossing  - 1 x daily - 7 x weekly - 3 sets - 10 reps  - Standing Scapular Protraction Retraction with Hand on Wall  - 1 x daily - 7 x weekly - 3 sets - 10 reps    Manuals 3/14 3/19 3/21          Shoulder R PROM   SC gentle           R median nerve glides.    SC                       Reassessment          "    Neuro Re-Ed             Iso scap ret. 20x5\"  20x5\"            Prone I's  20x5\"            Prone T's  20x5\"            Prone Y's             Prone Rows   20x5\"            D2 flexion with TB              Carlin M, L YTB 2x10  RTB 2x10 3\" RTB  2x 10 3\"           Ther Ex             UBE for posture/cardio  3'/3'  3'/3'          Pulleys              Wall slides - flex             Table slides - flex  20x5\"  10x 5\"           Table slides - abduction  20x5\"  10x 5\"           Table slides - ER gentle   10x 5\"           Supine AROM flexion             Doorway chest stretch  10x10\"            TB ER             TB IR             TB BL ER 20x5\" YTB                         scaption wall slides with TB                           TB horizontal abd pull out with press              Median nerve glides on wall 20x            Ulnar nerve glides 20x                         Ther Activity                                       Gait Training                                       Modalities                                                 "

## 2024-03-26 ENCOUNTER — OFFICE VISIT (OUTPATIENT)
Dept: PHYSICAL THERAPY | Facility: CLINIC | Age: 29
End: 2024-03-26
Payer: COMMERCIAL

## 2024-03-26 DIAGNOSIS — M75.41 IMPINGEMENT SYNDROME OF RIGHT SHOULDER: Primary | ICD-10-CM

## 2024-03-26 DIAGNOSIS — M25.311 DYSKINESIS OF RIGHT SCAPULA: ICD-10-CM

## 2024-03-26 DIAGNOSIS — M25.511 RIGHT SHOULDER PAIN, UNSPECIFIED CHRONICITY: ICD-10-CM

## 2024-03-26 PROCEDURE — 97110 THERAPEUTIC EXERCISES: CPT

## 2024-03-26 PROCEDURE — 97140 MANUAL THERAPY 1/> REGIONS: CPT

## 2024-03-26 NOTE — PROGRESS NOTES
Daily Note    Today's date: 24  Patient name: Dmitri Sharma  : 1995  MRN: [id number]  Referring provider: Chrystal Mast PA-C  Dx:   Encounter Diagnosis     ICD-10-CM    1. Impingement syndrome of right shoulder  M75.41       2. Right shoulder pain, unspecified chronicity  M25.511       3. Dyskinesis of right scapula  M25.311           Start Time: 1745  Stop Time: 1830  Total time in clinic (min): 45 minutes      Subjective: Dmitri reports that she feels way worse today. She had some severe pain on Friday including sharp neck pain and arm swelling.    Objective: See treatment diary below.    Assessment: Dmitri tolerated treatment fair with consistent cuing throughout. TE's were performed with decreased reps and decreased resistance. New TE's were demonstrated with proper technique, and tolerated fair. Following treatment, the patient demonstrated fatigue and would benefit from continued physical therapy.    Plan: Continue per plan of care.  Progress treatment as tolerated.         Precautions: Standard     POC expires Unit limit Auth  expiration date PT/OT + Visit Limit?   24 BOMN 23                  Visit/Unit Tracking  AUTH Status:  Date 3/14 3/19 3/21 3/26           Approved 12  Used 1 2 3 4            Remaining  11 10 9  8             Access Code: [email or web address]  URL: [email or web address]  Date: 2024  Prepared by: Peter Marx    Exercises  - Standing Shoulder External Rotation with Resistance  - 1 x daily - 7 x weekly - 3 sets - 10 reps  - Standing Shoulder Row with Anchored Resistance  - 1 x daily - 7 x weekly - 3 sets - 10 reps  - Ulnar Nerve Flossing  - 1 x daily - 7 x weekly - 3 sets - 10 reps  - Standing Scapular Protraction Retraction with Hand on Wall  - 1 x daily - 7 x weekly - 3 sets - 10 reps    Manuals 3/14 3/19 3/21 3/26         Shoulder R PROM   SC gentle           R median nerve glides.    SC          Manual traction    TS         SOR    TS                "                    Reassessment             Neuro Re-Ed             Iso scap ret. 20x5\"  20x5\"            Prone I's  20x5\"            Prone T's  20x5\"            Prone Y's             Prone Rows   20x5\"            D2 flexion with TB              MARIELA Chan YTB 2x10  RTB 2x10 3\" RTB  2x 10 3\"           Ther Ex             UBE for posture/cardio  3'/3'  3'/3' 4'/4'          Pulleys              Wall slides - flex             Table slides - flex  20x5\"  10x 5\"           Table slides - abduction  20x5\"  10x 5\"           Table slides - ER gentle   10x 5\"           Supine AROM flexion             Doorway chest stretch  10x10\"            TB ER             TB IR             TB BL ER 20x5\" YTB                         scaption wall slides with TB                           UT str    10x10\"          Towel rotation    10x10\"                       Median nerve glides on wall 20x            Ulnar nerve glides 20x                         Ther Activity                                       Gait Training                                       Modalities                                                   Peter Marx, PT  3/26/2024,6:12 PM  "

## 2024-03-28 ENCOUNTER — OFFICE VISIT (OUTPATIENT)
Dept: PHYSICAL THERAPY | Facility: CLINIC | Age: 29
End: 2024-03-28
Payer: COMMERCIAL

## 2024-03-28 DIAGNOSIS — M75.41 IMPINGEMENT SYNDROME OF RIGHT SHOULDER: Primary | ICD-10-CM

## 2024-03-28 DIAGNOSIS — M25.511 RIGHT SHOULDER PAIN, UNSPECIFIED CHRONICITY: ICD-10-CM

## 2024-03-28 DIAGNOSIS — M25.311 DYSKINESIS OF RIGHT SCAPULA: ICD-10-CM

## 2024-03-28 PROCEDURE — 97110 THERAPEUTIC EXERCISES: CPT

## 2024-03-28 PROCEDURE — 97112 NEUROMUSCULAR REEDUCATION: CPT

## 2024-03-28 NOTE — PROGRESS NOTES
Daily Note    Today's date: 24  Patient name: Dmitri Sharma  : 1995  MRN: [id number]  Referring provider: Chrystal Mast PA-C  Dx:   Encounter Diagnosis     ICD-10-CM    1. Impingement syndrome of right shoulder  M75.41       2. Right shoulder pain, unspecified chronicity  M25.511       3. Dyskinesis of right scapula  M25.311           Start Time: 1700  Stop Time: 1745  Total time in clinic (min): 45 minutes      Subjective: Dmitri reports soreness today. She had to use a different car at work and is a little sore from that.    Objective: See treatment diary below.    Assessment: Dmitri tolerated treatment well with consistent cuing throughout. TE's were performed with increased reps. New TE's were demonstrated with proper technique, and tolerated well. Following treatment, the patient demonstrated fatigue and would benefit from continued physical therapy.    Plan: Continue per plan of care.  Progress treatment as tolerated.         Precautions: Standard     POC expires Unit limit Auth  expiration date PT/OT + Visit Limit?   24 BOMN 23                  Visit/Unit Tracking  AUTH Status:  Date 3/14 3/19 3/21 3/26 3/28          Approved 12  Used 1 2 3 4 5           Remaining  11 10 9  8 7            Access Code: [email or web address]  URL: [email or web address]  Date: 2024  Prepared by: Peter Marx    Exercises  - Standing Shoulder External Rotation with Resistance  - 1 x daily - 7 x weekly - 3 sets - 10 reps  - Standing Shoulder Row with Anchored Resistance  - 1 x daily - 7 x weekly - 3 sets - 10 reps  - Ulnar Nerve Flossing  - 1 x daily - 7 x weekly - 3 sets - 10 reps  - Standing Scapular Protraction Retraction with Hand on Wall  - 1 x daily - 7 x weekly - 3 sets - 10 reps    Manuals 3/14 3/19 3/21 3/26 3/28        Shoulder R PROM   SC gentle           R median nerve glides.    SC          Manual traction    TS         SOR    TS                                   Reassessment        "      Neuro Re-Ed             Iso scap ret. 20x5\"  20x5\"    20x5\"         Prone I's  20x5\"    20x5\"         Prone T's  20x5\"    20x5\"         Prone Y's             Prone Rows   20x5\"            Wall walk w/ foam roll lift     10x5\" BL                     D2 flexion with TB              Webslide M, L YTB 2x10  RTB 2x10 3\" RTB  2x 10 3\"           Ther Ex             UBE for posture/cardio  3'/3'  3'/3' 4'/4'  4'/4'         Pulleys              Wall slides - flex             Table slides - flex  20x5\"  10x 5\"           Table slides - abduction  20x5\"  10x 5\"           Table slides - ER gentle   10x 5\"           Supine AROM flexion             Doorway chest stretch  10x10\"    10x10\" R only        TB ER             TB IR             TB BL ER 20x5\" YTB                         scaption wall slides with TB              Pulleys      5'        UT str    10x10\"          Towel rotation    10x10\"  10x10\"         Towel ext     10x10\"         Median nerve glides on wall 20x            Ulnar nerve glides 20x                         Ther Activity                                       Gait Training                                       Modalities                                                     Peter Marx, PT  3/28/2024,5:55 PM  "

## 2024-03-29 NOTE — PROGRESS NOTES
Daily Note    Today's date: 24  Patient name: Dmitri Sharma  : 1995  MRN: [id number]  Referring provider: Chrystal Mast PA-C  Dx:   Encounter Diagnosis     ICD-10-CM    1. Impingement syndrome of right shoulder  M75.41       2. Right shoulder pain, unspecified chronicity  M25.511       3. Dyskinesis of right scapula  M25.311           Start Time: 1530  Stop Time: 1615  Total time in clinic (min): 45 minutes      Subjective: Dmitri reports feeling a little better than last week.    Objective: See treatment diary below. Notes no change following mechanical traction trial.    Assessment: Dmitri tolerated treatment well with consistent cuing throughout. TE's were performed with increased reps. New TE's were demonstrated with proper technique, and tolerated well. Following treatment, the patient demonstrated fatigue and would benefit from continued physical therapy.    Plan: Continue per plan of care.  Progress treatment as tolerated.         Precautions: Standard     POC expires Unit limit Auth  expiration date PT/OT + Visit Limit?   24 BOMN 23                  Visit/Unit Tracking  AUTH Status:  Date 3/14 3/19 3/21 3/26 3/28 4/2         Approved 12  Used 1 2 3 4 5 6          Remaining  11 10 9  8 7 6           Access Code: [email or web address]  URL: [email or web address]  Date: 2024  Prepared by: Peter Marx    Exercises  - Standing Shoulder External Rotation with Resistance  - 1 x daily - 7 x weekly - 3 sets - 10 reps  - Standing Shoulder Row with Anchored Resistance  - 1 x daily - 7 x weekly - 3 sets - 10 reps  - Ulnar Nerve Flossing  - 1 x daily - 7 x weekly - 3 sets - 10 reps  - Standing Scapular Protraction Retraction with Hand on Wall  - 1 x daily - 7 x weekly - 3 sets - 10 reps    Manuals 3/14 3/19 3/21 3/26 3/28 4/2       Shoulder R PROM   SC gentle           R median nerve glides.    SC          Manual traction    TS         SOR    TS                                  "  Reassessment             Neuro Re-Ed             Iso scap ret. 20x5\"  20x5\"    20x5\"  NV       Prone I's  20x5\"    20x5\"  NV       Prone T's  20x5\"    20x5\"  NV       Prone Y's      NV       Prone Rows   20x5\"     NV       Wall walk w/ foam roll lift     10x5\" BL                     D2 flexion with TB              Webslide M, L YTB 2x10  RTB 2x10 3\" RTB  2x 10 3\"           Ther Ex             UBE for posture/cardio  3'/3'  3'/3' 4'/4'  4'/4'  4'/4'        Pulleys       5'        Wall slides - flex             Table slides - flex  20x5\"  10x 5\"           Table slides - abduction  20x5\"  10x 5\"           Table slides - ER gentle   10x 5\"           Supine AROM flexion             Doorway chest stretch  10x10\"    10x10\" R only        Self STM pec minor      3'        TB ER             TB IR             TB BL ER 20x5\" YTB            Finger ladder w/ ecc. control      15x5\"       scaption wall slides with TB              Pulleys      5'        UT str    10x10\"          Towel rotation    10x10\"  10x10\"         Towel ext     10x10\"         Median nerve glides on wall 20x            Ulnar nerve glides 20x                         Ther Activity                                       Gait Training                                       Modalities             Mechanical cervical traction     5' 20#                                     Peter Marx, PT  4/2/2024,4:05 PM  "

## 2024-04-02 ENCOUNTER — OFFICE VISIT (OUTPATIENT)
Dept: PHYSICAL THERAPY | Facility: CLINIC | Age: 29
End: 2024-04-02
Payer: COMMERCIAL

## 2024-04-02 DIAGNOSIS — M25.311 DYSKINESIS OF RIGHT SCAPULA: ICD-10-CM

## 2024-04-02 DIAGNOSIS — M25.511 RIGHT SHOULDER PAIN, UNSPECIFIED CHRONICITY: ICD-10-CM

## 2024-04-02 DIAGNOSIS — M75.41 IMPINGEMENT SYNDROME OF RIGHT SHOULDER: Primary | ICD-10-CM

## 2024-04-02 PROCEDURE — 97110 THERAPEUTIC EXERCISES: CPT

## 2024-04-02 PROCEDURE — 97012 MECHANICAL TRACTION THERAPY: CPT

## 2024-04-02 PROCEDURE — 97112 NEUROMUSCULAR REEDUCATION: CPT

## 2024-04-04 ENCOUNTER — OFFICE VISIT (OUTPATIENT)
Dept: PHYSICAL THERAPY | Facility: CLINIC | Age: 29
End: 2024-04-04
Payer: COMMERCIAL

## 2024-04-04 DIAGNOSIS — M25.311 DYSKINESIS OF RIGHT SCAPULA: ICD-10-CM

## 2024-04-04 DIAGNOSIS — M25.511 RIGHT SHOULDER PAIN, UNSPECIFIED CHRONICITY: ICD-10-CM

## 2024-04-04 DIAGNOSIS — M75.41 IMPINGEMENT SYNDROME OF RIGHT SHOULDER: Primary | ICD-10-CM

## 2024-04-04 PROCEDURE — 97110 THERAPEUTIC EXERCISES: CPT

## 2024-04-04 PROCEDURE — 97112 NEUROMUSCULAR REEDUCATION: CPT

## 2024-04-04 NOTE — PROGRESS NOTES
Daily Note    Today's date: 24  Patient name: Dmitri Sharma  : 1995  MRN: [id number]  Referring provider: Chrystal Mast PA-C  Dx:   Encounter Diagnosis     ICD-10-CM    1. Impingement syndrome of right shoulder  M75.41       2. Right shoulder pain, unspecified chronicity  M25.511       3. Dyskinesis of right scapula  M25.311           Start Time: 1600  Stop Time: 1645  Total time in clinic (min): 45 minutes      Subjective: Dmitri reports that she feels her shoulder is getting better little by little every day. She has to be careful not to do too much with it.    Objective: See treatment diary below.    Assessment: Dmitri tolerated treatment well with consistent cuing throughout. TE's were performed with increased reps and increased resistance. New TE's were demonstrated with proper technique, and tolerated well. Following treatment, the patient demonstrated fatigue and would benefit from continued physical therapy.    Plan: Continue per plan of care.  Progress treatment as tolerated.         Precautions: Standard     POC expires Unit limit Auth  expiration date PT/OT + Visit Limit?   24 BOMN 23                  Visit/Unit Tracking  AUTH Status:  Date 3/14 3/19 3/21 3/26 3/28 4/2 4/4        Approved 12  Used 1 2 3 4 5 6 7         Remaining  11 10 9  8 7 6 5          Access Code: [email or web address]  URL: [email or web address]  Date: 2024  Prepared by: Peter Marx    Exercises  - Standing Shoulder External Rotation with Resistance  - 1 x daily - 7 x weekly - 3 sets - 10 reps  - Standing Shoulder Row with Anchored Resistance  - 1 x daily - 7 x weekly - 3 sets - 10 reps  - Ulnar Nerve Flossing  - 1 x daily - 7 x weekly - 3 sets - 10 reps  - Standing Scapular Protraction Retraction with Hand on Wall  - 1 x daily - 7 x weekly - 3 sets - 10 reps    Manuals 3/14 3/19 3/21 3/26 3/28 4/2 4/      Shoulder R PROM   SC gentle           R median nerve glides.    SC          Manual  "traction    TS         SOR    TS                                   Reassessment             Neuro Re-Ed             Iso scap ret. 20x5\"  20x5\"    20x5\"  NV       Prone I's  20x5\"    20x5\"  NV 2x10       Prone T's  20x5\"    20x5\"  NV 2x10       Prone Y's      NV 2x10       Prone Rows   20x5\"     NV 2x10 2#      Wall walk w/ foam roll lift     10x5\" BL                     D2 flexion with TB              Webslide M, L YTB 2x10  RTB 2x10 3\" RTB  2x 10 3\"     GTB 3x10       Ther Ex             UBE for posture/cardio  3'/3'  3'/3' 4'/4'  4'/4'  4'/4'  4'/4'       Pulleys       5'  5'      Wall slides - flex             Table slides - flex  20x5\"  10x 5\"     PB 20x5\"       Table slides - abduction  20x5\"  10x 5\"           Table slides - ER gentle   10x 5\"           Supine AROM flexion             Doorway chest stretch  10x10\"    10x10\" R only        Self STM pec minor      3'        TB ER             TB IR             TB BL ER 20x5\" YTB            Finger ladder w/ ecc. control      15x5\"       scaption wall slides with TB              Pulleys      5'        UT str    10x10\"          Towel rotation    10x10\"  10x10\"         Towel ext     10x10\"         Median nerve glides on wall 20x            Ulnar nerve glides 20x                         Ther Activity                                       Gait Training                                       Modalities             Mechanical cervical traction     5' 20#                                       Peter Marx, PT  4/4/2024,4:52 PM  "

## 2024-04-09 ENCOUNTER — OFFICE VISIT (OUTPATIENT)
Dept: PHYSICAL THERAPY | Facility: CLINIC | Age: 29
End: 2024-04-09
Payer: COMMERCIAL

## 2024-04-09 DIAGNOSIS — M75.41 IMPINGEMENT SYNDROME OF RIGHT SHOULDER: Primary | ICD-10-CM

## 2024-04-09 DIAGNOSIS — M25.511 RIGHT SHOULDER PAIN, UNSPECIFIED CHRONICITY: ICD-10-CM

## 2024-04-09 DIAGNOSIS — M25.311 DYSKINESIS OF RIGHT SCAPULA: ICD-10-CM

## 2024-04-09 PROCEDURE — 97112 NEUROMUSCULAR REEDUCATION: CPT | Performed by: PHYSICAL THERAPIST

## 2024-04-09 PROCEDURE — 97110 THERAPEUTIC EXERCISES: CPT | Performed by: PHYSICAL THERAPIST

## 2024-04-09 NOTE — PROGRESS NOTES
Daily Note     Today's date: 2024  Patient name: Dmitri Sharma  : 1995  MRN: [id number]  Referring provider: Chrystal Mast PA-C  Dx:   Encounter Diagnosis     ICD-10-CM    1. Impingement syndrome of right shoulder  M75.41       2. Right shoulder pain, unspecified chronicity  M25.511       3. Dyskinesis of right scapula  M25.311                      Subjective: Patient says her shoulder continues to feel better in general.  She reports having some muscle soreness after last PT session.  Today she has a little more anterior shoulder discomfort which she attributes to backing up an unfamiliar truck on .  She had her arm on the center console and put some pressure through her arm.  It has been sore since that time.      Objective: See treatment diary below      Assessment: Tolerated treatment well. Patient demonstrated fatigue post treatment and would benefit from continued PT.  Held Prone horizontal abduction today due to soreness.  Patient states her shoulder was less sore with movement after trial of IASTM to the proximal bicep tendon today.      Plan: Continue per plan of care.  Progress treatment as tolerated.       Precautions: Standard     POC expires Unit limit Auth  expiration date PT/OT + Visit Limit?   24 BOMN 23 12                 Visit/Unit Tracking  AUTH Status:  Date 3/14 3/19 3/21 3/26 3/28 4/2 4/4 4/9       Approved 12  Used 1 2 3 4 5 6 7 8        Remaining  11 10 9  8 7 6 5 4         Access Code: [email or web address]  URL: [email or web address]  Date: 2024  Prepared by: Peter Marx    Exercises  - Standing Shoulder External Rotation with Resistance  - 1 x daily - 7 x weekly - 3 sets - 10 reps  - Standing Shoulder Row with Anchored Resistance  - 1 x daily - 7 x weekly - 3 sets - 10 reps  - Ulnar Nerve Flossing  - 1 x daily - 7 x weekly - 3 sets - 10 reps  - Standing Scapular Protraction Retraction with Hand on Wall  - 1 x daily - 7 x weekly - 3 sets - 10  "reps    Manuals 3/14 3/19 3/21 3/26 3/28 4/2 4/4 4/9     Shoulder R PROM   SC gentle           R median nerve glides.    SC          Manual traction    TS         SOR    TS         IASTM right proximal bicep tendon        JF                  Reassessment             Neuro Re-Ed             Iso scap ret. 20x5\"  20x5\"    20x5\"  NV       Prone I's  20x5\"    20x5\"  NV 2x10       Prone T's  20x5\"    20x5\"  NV 2x10  Held today     Prone Y's      NV 2x10       Prone Rows   20x5\"     NV 2x10 2# 3\" 2x10     Wall walk w/ foam roll lift     10x5\" BL                     D2 flexion with TB              Webslide M, L YTB 2x10  RTB 2x10 3\" RTB  2x 10 3\"     GTB 3x10  GTB 2x10      Ther Ex             UBE for posture/cardio  3'/3'  3'/3' 4'/4'  4'/4'  4'/4'  4'/4'  4'/4'      Pulleys       5'  5' 5'     Wall slides - flex             Table slides - flex  20x5\"  10x 5\"     PB 20x5\"       Table slides - abduction  20x5\"  10x 5\"           Table slides - ER gentle   10x 5\"           Supine AROM flexion             Doorway chest stretch  10x10\"    10x10\" R only        Self STM pec minor      3'        TB ER             TB IR             TB BL ER 20x5\" YTB            Finger ladder w/ ecc. control      15x5\"       scaption wall slides with TB              Pulleys      5'        UT str    10x10\"          Towel rotation    10x10\"  10x10\"         Towel ext     10x10\"         Median nerve glides on wall 20x            Ulnar nerve glides 20x                         Ther Activity                                       Gait Training                                       Modalities             Mechanical cervical traction     5' 20#                                         "

## 2024-04-10 ENCOUNTER — OFFICE VISIT (OUTPATIENT)
Dept: OBGYN CLINIC | Facility: CLINIC | Age: 29
End: 2024-04-10

## 2024-04-10 VITALS
WEIGHT: 103 LBS | BODY MASS INDEX: 17.58 KG/M2 | DIASTOLIC BLOOD PRESSURE: 88 MMHG | HEIGHT: 64 IN | SYSTOLIC BLOOD PRESSURE: 127 MMHG | HEART RATE: 96 BPM

## 2024-04-10 DIAGNOSIS — M25.511 RIGHT SHOULDER PAIN, UNSPECIFIED CHRONICITY: Primary | ICD-10-CM

## 2024-04-10 NOTE — PROGRESS NOTES
Ortho Sports Medicine Shoulder New Patient Visit     Assesment:   28 y.o. female with right shoulder pain ongoing for 8 weeks without any known injury. Exam concerning for a labral tear.     Plan:  I reviewed the history and exam with the patient in clinic today.  Patient has been doing physical therapy and taking anti-inflammatory medications but states that her symptoms have not improved significantly.  I discussed that at this point I recommend getting an MR arthrogram of the shoulder to evaluate for any intra-articular pathology including a labral tear.  She did have some pain on the load-and-shift test which is concerning for possible labral tear.  I discussed that further treatment will depend on the findings of the MR arthrogram.  Patient demonstrated understanding discussion was agreed to plan.  All questions answered.  She reach out to clinic with any questions or concerns at any time.    Conservative treatment:  Ice to shoulder 1-2 times daily, for 20 minutes at a time.  Continue PT for ROM and strengthening to shoulder, rotator cuff, scapular stabilizers.  Let pain guide return to activities.    Imaging:  All imaging from today was reviewed by myself and explained to the patient. MR arthrogram ordered as patient has failed conservative treatment and still has continued pain with exam concerning for possible labral tear.    Injection:  No Injection planned at this time.    Surgery:   No surgery is recommended at this point, continue with conservative treatment plan as noted.    Follow up:  Return for Recheck after MRI.      Chief Complaint   Patient presents with    Right Shoulder - Pain, Follow-up         History of Present Illness:  The patient is a 28 y.o. female seen in clinic for a 6 week follow up of the right shoulder for pain. Patient states she continues to have shoulder pain despite PT and meloxicam. She state the meloxicam helped with the throbbing pain.  She states she does note burning pain  since Sunday when she accidentally applied too much pressure of the right elbow onto the middle console of her truck when backing up. She states the pain is anteriorly along the joint. It originally started about 8 weeks ago. 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 The pain is associated with weakness and popping. Pain does radiate down the arm with overuse. The patient characterizes the intensity of pain as a 7 out of 10 today. The patient states that the pain is interfering with her sleep.  Symptoms are aggravated by overuse. The patient has tried rest, ice and NSAIDs in addition to 4 weeks ok Mobic and PT. Symptoms have worsened since the onset. Patient has no history of prior injury, surgery.    Occupation: [employer]    The patient has the following co-morbidities: n/a    Shoulder Surgical History:  None    Past Medical, Social and Family History:  History reviewed. No pertinent past medical history.  History reviewed. No pertinent surgical history.  Allergies   Allergen Reactions    Miconazole      Current Outpatient Medications on File Prior to Visit   Medication Sig Dispense Refill    diphenhydrAMINE (BENADRYL) 25 mg tablet Take 25 mg by mouth every 6 (six) hours as needed for itching      fexofenadine (ALLEGRA) 60 MG tablet Take 60 mg by mouth daily      meloxicam (Mobic) 15 mg tablet Take 1 tablet (15 mg total) by mouth daily 30 tablet 0    predniSONE 10 mg tablet Take 3 tabs bid x 2 days, take 2 tabs bid x 2 days, take 1 tab bid x 2 days, take 1 tab daily x 2 days (Patient not taking: Reported on 8/16/2023) 26 tablet 0     No current facility-administered medications on file prior to visit.     Social History     Socioeconomic History    Marital status: /Civil Union     Spouse name: Not on file    Number of children: Not on file    Years of education: Not on file    Highest education level: Not on file   Occupational History  "   Not on file   Tobacco Use    Smoking status: Never     Passive exposure: Never    Smokeless tobacco: Never   Vaping Use    Vaping status: Never Used   Substance and Sexual Activity    Alcohol use: Yes     Comment: rare    Drug use: Never    Sexual activity: Not Currently   Other Topics Concern    Not on file   Social History Narrative    Not on file     Social Determinants of Health     Financial Resource Strain: Not on file   Food Insecurity: Not on file   Transportation Needs: Not on file   Physical Activity: Not on file   Stress: Not on file   Social Connections: Not on file   Intimate Partner Violence: Not on file   Housing Stability: Not on file       I have reviewed the past medical, surgical, social and family history, medications and allergies as documented in the EMR.    Review of systems: ROS is negative other than that noted in the HPI.  Constitutional: Negative for fatigue and fever.      Physical Exam:    Blood pressure 127/88, pulse 96, height 5' 4\" (1.626 m), weight 46.7 kg (103 lb).    General/Constitutional: NAD, well developed, well nourished  HENT: Normocephalic, atraumatic  CV: Intact distal pulses, regular rate  Resp: No respiratory distress or labored breathing  Neuro: Alert and Oriented x 3, no focal deficits  Psych: Normal mood, normal affect, normal judgement, normal behavior  Skin: Warm, dry, no rashes, no erythema      Focused right shoulder exam:  No paracervical tenderness.   No cervical tenderness.   No pain with neck flexion, extension, side-to-side bending, or rotation.     The skin is intact without evidence of erythema or ecchymosis. Symmetric shoulders with no evidence of supraspinatus or infraspinatus muscle atrophy. There is no evidence neurologic medial or lateral scapular winging. Anterior tilt noted with scapular positioning.    Palpation demonstrates tenderness mildly over the AC joint and coracoid but no tenderness over the SC joint, bilateral clavicle, lateral aspect of " the acromion or posterior joint line, or bicipital groove.    Shoulder ROM demonstrates 170 degrees of active passive forward elevation. External rotation with the arms at the side demonstrates 80 degree of active of passive motion.  Internal rotation is to T8.        Strength testing demonstrates 5/5 strength in empty can position (with pain), 5/5 with resisted external rotation with the arm at the side.  5/5 strength of the subscapularis and a negative belly press.  Pain with resisted elbow flexion.     Provocative testing for the following demonstrate-  Impingement- negative Hawkin's impingement test, mildly positive Neer impingement sign.  Biceps- negative [place], mildly positive Speeds test.  Labrum- equivocal Jupiter test, negative sulcus sign.    Stability- negative apprehension sign and a negative relocation test, positive anterior load and shift, negative posterior load and shift testing, negative Jess test and a negative Jerk test.    UE NV Exam: +2 Radial pulses bilaterally. Fingers are warm and well-perfused.  Sensation intact to light touch C5-T1 bilaterally, Radial/median/ulnar nerve motor intact      Shoulder Imaging    Radiographs of the right shoulder were obtained on 2/28/24 and reviewed with the patient.  Based on my independent evaluation, the imaging shows no acute osseous abnormalities..      Scribe Attestation      I,:  Chrystal Mast PA-C am acting as a scribe while in the presence of the attending physician.:       I,:  Zhen Wagner MD personally performed the services described in this documentation    as scribed in my presence.:

## 2024-04-11 ENCOUNTER — OFFICE VISIT (OUTPATIENT)
Dept: PHYSICAL THERAPY | Facility: CLINIC | Age: 29
End: 2024-04-11
Payer: COMMERCIAL

## 2024-04-11 DIAGNOSIS — M75.41 IMPINGEMENT SYNDROME OF RIGHT SHOULDER: Primary | ICD-10-CM

## 2024-04-11 DIAGNOSIS — M25.311 DYSKINESIS OF RIGHT SCAPULA: ICD-10-CM

## 2024-04-11 DIAGNOSIS — M25.511 RIGHT SHOULDER PAIN, UNSPECIFIED CHRONICITY: ICD-10-CM

## 2024-04-11 PROCEDURE — 97110 THERAPEUTIC EXERCISES: CPT

## 2024-04-11 PROCEDURE — 97112 NEUROMUSCULAR REEDUCATION: CPT

## 2024-04-11 NOTE — PROGRESS NOTES
Daily Note    Today's date: 24  Patient name: Dmitri Sharma  : 1995  MRN: [id number]  Referring provider: Chrystal Mast PA-C  Dx:   Encounter Diagnosis     ICD-10-CM    1. Impingement syndrome of right shoulder  M75.41       2. Right shoulder pain, unspecified chronicity  M25.511       3. Dyskinesis of right scapula  M25.311           Start Time: 1600  Stop Time: 1645  Total time in clinic (min): 45 minutes      Subjective: Dmitri reports she was really sore the day after last session where she had graston, but it feels better today.    Objective: See treatment diary below.    Assessment: Dmitri tolerated treatment well with consistent cuing throughout. TE's were performed with increased reps and increased resistance. No new TE's were performed today. Following treatment, the patient demonstrated fatigue and would benefit from continued physical therapy.    Plan: Continue per plan of care.  Progress treatment as tolerated.         Precautions: Standard     POC expires Unit limit Auth  expiration date PT/OT + Visit Limit?   24 BOMN 23 12                 Visit/Unit Tracking  AUTH Status:  Date 3/14 3/19 3/21 3/26 3/28 4/2 4/4 4/9 4/11      Approved 12  Used 1 2 3 4 5 6 7 8 9       Remaining  11 10 9  8 7 6 5 4 3        Access Code: [email or web address]  URL: [email or web address]  Date: 2024  Prepared by: Peter Marx    Exercises  - Standing Shoulder External Rotation with Resistance  - 1 x daily - 7 x weekly - 3 sets - 10 reps  - Standing Shoulder Row with Anchored Resistance  - 1 x daily - 7 x weekly - 3 sets - 10 reps  - Ulnar Nerve Flossing  - 1 x daily - 7 x weekly - 3 sets - 10 reps  - Standing Scapular Protraction Retraction with Hand on Wall  - 1 x daily - 7 x weekly - 3 sets - 10 reps    Manuals 3/14 3/19 3/21 3/26 3/28 4/2 4    Shoulder R PROM   SC gentle           R median nerve glides.    SC          Manual traction    TS         SOR    TS         IASTM  "right proximal bicep tendon        JF                  Reassessment             Neuro Re-Ed             Iso scap ret. 20x5\"  20x5\"    20x5\"  NV       Prone I's  20x5\"    20x5\"  NV 2x10       Prone T's  20x5\"    20x5\"  NV 2x10  Held today     Prone Y's      NV 2x10       Prone Rows   20x5\"     NV 2x10 2# 3\" 2x10     Wall walk w/ foam roll lift     10x5\" BL                     D2 flexion with TB              Webslide M, L YTB 2x10  RTB 2x10 3\" RTB  2x 10 3\"     GTB 3x10  GTB 2x10  [place] 15/11# 3x10     Ther Ex             UBE for posture/cardio  3'/3'  3'/3' 4'/4'  4'/4'  4'/4'  4'/4'  4'/4'  5'/5'     Pulleys       5'  5' 5' 5'     Wall slides - flex             Table slides - flex  20x5\"  10x 5\"     PB 20x5\"       Table slides - abduction  20x5\"  10x 5\"           Table slides - ER gentle   10x 5\"           Supine AROM flexion             Doorway chest stretch  10x10\"    10x10\" R only        Self STM pec minor      3'        [place] adduction         3x10 5# ecc.    TB ER             TB IR             TB BL ER 20x5\" YTB            Finger ladder w/ ecc. control      15x5\"       scaption wall slides with TB              Pulleys      5'        UT str    10x10\"          Towel rotation    10x10\"  10x10\"         Towel ext     10x10\"         Median nerve glides on wall 20x            Ulnar nerve glides 20x            Repeated shoulder ext.         2x10     Ther Activity                                       Gait Training                                       Modalities             Mechanical cervical traction     5' 20#                                           Peter Marx, PT  4/11/2024,4:44 PM  "

## 2024-04-11 NOTE — NURSING NOTE
Call placed to patient to discuss upcoming appointment at [facility] radiology department and complete consultation with patient. Patient is having a right shoulder MRI arthrogram utilizing fluoroscopy guidance. Reviewed patient's allergies, no current anticoagulant medication present per patient, also discussed the pre and post procedure expectations. Reminded patient of location and time expected for procedure, Patient expressed understanding by verbalizing and repeating instructions.

## 2024-04-16 ENCOUNTER — APPOINTMENT (OUTPATIENT)
Dept: PHYSICAL THERAPY | Facility: CLINIC | Age: 29
End: 2024-04-16
Payer: COMMERCIAL

## 2024-04-18 ENCOUNTER — EVALUATION (OUTPATIENT)
Dept: PHYSICAL THERAPY | Facility: CLINIC | Age: 29
End: 2024-04-18
Payer: COMMERCIAL

## 2024-04-18 DIAGNOSIS — M75.41 IMPINGEMENT SYNDROME OF RIGHT SHOULDER: Primary | ICD-10-CM

## 2024-04-18 DIAGNOSIS — M25.511 RIGHT SHOULDER PAIN, UNSPECIFIED CHRONICITY: ICD-10-CM

## 2024-04-18 DIAGNOSIS — M25.311 DYSKINESIS OF RIGHT SCAPULA: ICD-10-CM

## 2024-04-18 PROCEDURE — 97033 APP MDLTY 1+IONTPHRSIS EA 15: CPT

## 2024-04-18 PROCEDURE — 97110 THERAPEUTIC EXERCISES: CPT

## 2024-04-18 PROCEDURE — 97140 MANUAL THERAPY 1/> REGIONS: CPT

## 2024-04-18 NOTE — PROGRESS NOTES
PT Evaluation     Today's date: 24  Patient name: Dmitri Sharma  : 1995  MRN: [id number]  Referring provider: Chrystal Mast PA-C  Dx:   Encounter Diagnosis     ICD-10-CM    1. Impingement syndrome of right shoulder  M75.41       2. Right shoulder pain, unspecified chronicity  M25.511       3. Dyskinesis of right scapula  M25.311           Start Time: 1530  Stop Time: 1615  Total time in clinic (min): 45 minutes     Assessment  Assessment details: Dmitri Sharma is a pleasant 28 y.o. female who presents today for a re-evaluation of her R shoulder pain. Dmitri complains of aching pain in the right shoulder ranging from 0/10 to 8/10. Pain is exacerbated by activity, standing, or sleep and made better by medication or rest.    The patient demonstrates minimally decreased strength during resisted muscle testing, which has improved from initial evaluation. Pt ROM is within functional limits with pain at end ranges.    The patient has difficulty with leisure, sleeping, athletics, work, and driving. Patient will benefit from continued skilled physical therapy, including therapeutic exercise, stretching, manual therapy, and modalities prn to improve their level of function, to increase overall quality of life, and to address her impairments.    Dmitri has met some of her goals at this time. Pt was instructed on her updated plan of care and wishes to continue therapy.    Impairments: abnormal coordination, abnormal muscle firing, abnormal or restricted ROM, activity intolerance, impaired physical strength, lacks appropriate home exercise program, pain with function, scapular dyskinesis and poor posture   Understanding of Dx/Px/POC: excellent  Goals  ST. Independent with HEP in 2 weeks. - GOAL MET   2. Pt will have verbal report of improvement in symptoms by >/=25% in 2 weeks. - GOAL MET   3. Decrease pain to 7/10 at it's worst. - GOAL MET   4. Increase strength by 1/2 grade in all deficient planes. -  GOAL MET     To be achieved by D/C   LT. Pt will improve FOTO score by >/= goal points in 6 weeks. - GOAL PROGRESSING    2. Pt will improve FOTO score to >/= goal score by visit # 12. - GOAL PROGRESSING   3. Pt will be able to reach overhead with little to no difficulty. - GOAL PROGRESSING   4. Pt will be able to reach behind the back with little to no difficulty. - GOAL MET   5. Pt will be able to perform her usual work activities including reaching and lifting objects with her R arm with little to no difficulty. - GOAL NOT MET     Plan  Patient would benefit from: skilled PT  Planned modality interventions: cryotherapy, unattended electrical stimulation and thermotherapy: hydrocollator packs  Planned therapy interventions: activity modification, ADL retraining, behavior modification, body mechanics training, functional ROM exercises, home exercise program, IADL retraining, joint mobilization, manual therapy, massage, neuromuscular re-education, patient education, postural training, strengthening, stretching, therapeutic activities, therapeutic exercise and kinesiology taping  Frequency: 2x week  Duration in weeks: 8  Plan of Care beginning date: 2024  Plan of Care expiration date: 2024  Treatment plan discussed with: patient        Subjective Evaluation    History of Present Illness  Mechanism of injury: Erika reports that she is feeling a little better since starting therapy a month ago.    she notes some continued difficulty with her usual tasks including reaching out to the side, although she feels better with forward flexion.     she notes 4/10 pain at this time, but had a few days last week where it did not bother her. Since she aggravated it by putting weight through it, it has been more sore.    she notes adherence to HEP and would like to continue with therapy. She has an MRI scheduled next week and follows up with ortho.    Patient Goals  Patient goals for therapy: decreased edema, increased  motion, return to sport/leisure activities, increased strength, decreased pain and independence with ADLs/IADLs    Pain  Current pain ratin  At best pain ratin  At worst pain ratin  Quality: dull ache, throbbing and sharp  Relieving factors: medications  Aggravating factors: lifting, keyboarding and overhead activity  Progression: no change    Social Support    Employment status: working ()        Objective     Cervical/Thoracic Screen   Cervical range of motion within normal limits  Thoracic range of motion within normal limits    Neurological Testing     Sensation     Shoulder   Left Shoulder   Intact: light touch    Right Shoulder   Intact: Light touch    Reflexes   Left   Biceps (C5/C6): normal (2+)  Brachioradialis (C6): normal (2+)    Right   Biceps (C5/C6): normal (2+)  Brachioradialis (C6): normal (2+)    Active Range of Motion   Left Shoulder   Normal active range of motion  Abduction: 180 degrees   External rotation BTH: T2 WFL  Internal rotation BTB: WFL    Right Shoulder   Normal active range of motion  Abduction: 180 degrees with pain  External rotation BTH: C6 WFL and with pain  Internal rotation BTB: WFL and with pain  Mechanical Assessment    Cervical    Seated retraction: repeated movements   Pain location: no change  Seated Extension: repeated movements  Pain location: no change    Thoracic      Lumbar      Strength/Myotome Testing     Left Shoulder   Normal muscle strength    Right Shoulder     Planes of Motion   Flexion: 4+   Abduction: 4+   External rotation at 0°: 4+   Internal rotation at 0°: 4+     Isolated Muscles   Biceps: 4+   Triceps: 4        Precautions: Standard     POC expires Unit limit Auth  expiration date PT/OT + Visit Limit?   24 BOMN 23 12                 Visit/Unit Tracking  AUTH Status:  Date 3/14 3/19 3/21 3/26 3/28 4/2 4/4 4/9 4/11 4/18     Approved 12  Used 1 2 3 4 5 6 7 8 9 10      Remaining  11 10 9  8 7 6 5 4 3 2       Access Code:  [email or web address]  URL: [email or web address]  Date: 03/14/2024  Prepared by: Peter Marx    Exercises  - Standing Shoulder External Rotation with Resistance  - 1 x daily - 7 x weekly - 3 sets - 10 reps  - Standing Shoulder Row with Anchored Resistance  - 1 x daily - 7 x weekly - 3 sets - 10 reps  - Ulnar Nerve Flossing  - 1 x daily - 7 x weekly - 3 sets - 10 reps  - Standing Scapular Protraction Retraction with Hand on Wall  - 1 x daily - 7 x weekly - 3 sets - 10 reps    Manuals 3/14 3/19 3/21 3/26 3/28 4/2 4/4 4/9 4/11 4/18   Shoulder R PROM   SC gentle           R median nerve glides.    SC          Manual traction    TS         SOR    TS         IASTM right proximal bicep tendon        JF                  Reassessment          TS   Neuro Re-Ed             Iso scap ret. 20x5\"  20x5\"    20x5\"  NV       Prone I's  20x5\"    20x5\"  NV 2x10       Prone T's  20x5\"    20x5\"  NV 2x10  Held today     Prone Y's      NV 2x10       Prone Rows   20x5\"     NV 2x10 2# 3\" 2x10     Wall walk w/ foam roll lift     10x5\" BL                     D2 flexion with TB              Webslide M, L YTB 2x10  RTB 2x10 3\" RTB  2x 10 3\"     GTB 3x10  GTB 2x10  [place] 15/11# 3x10     Ther Ex             UBE for posture/cardio  3'/3'  3'/3' 4'/4'  4'/4'  4'/4'  4'/4'  4'/4'  5'/5'  5'/5'    Pulleys       5'  5' 5' 5'  5'   Wall slides - flex             Table slides - flex  20x5\"  10x 5\"     PB 20x5\"       Table slides - abduction  20x5\"  10x 5\"           Table slides - ER gentle   10x 5\"           Supine AROM flexion             Doorway chest stretch  10x10\"    10x10\" R only        Self STM pec minor      3'        [place] adduction         3x10 5# ecc.    TB ER             TB IR             TB BL ER 20x5\" YTB            Finger ladder w/ ecc. control      15x5\"       scaption wall slides with TB              Pulleys      5'        UT str    10x10\"          Towel rotation    10x10\"  10x10\"         Towel ext     10x10\"         Median nerve " glides on wall 20x            Ulnar nerve glides 20x            Repeated shoulder ext.         2x10     Ther Activity                                       Gait Training                                       Modalities             Mechanical cervical traction     5' 20#        TENS          R arm L4 10'

## 2024-04-22 ENCOUNTER — HOSPITAL ENCOUNTER (OUTPATIENT)
Dept: RADIOLOGY | Facility: HOSPITAL | Age: 29
Discharge: HOME | End: 2024-04-22
Payer: COMMERCIAL

## 2024-04-22 DIAGNOSIS — M25.511 RIGHT SHOULDER PAIN, UNSPECIFIED CHRONICITY: ICD-10-CM

## 2024-04-22 PROCEDURE — A9585 GADOBUTROL INJECTION: HCPCS | Performed by: PHYSICIAN ASSISTANT

## 2024-04-22 PROCEDURE — 73222 MRI JOINT UPR EXTREM W/DYE: CPT

## 2024-04-22 PROCEDURE — 77002 NEEDLE LOCALIZATION BY XRAY: CPT

## 2024-04-22 PROCEDURE — 23350 INJECTION FOR SHOULDER X-RAY: CPT

## 2024-04-22 RX ORDER — SODIUM CHLORIDE 9 MG/ML
10 INJECTION INTRAVENOUS
Status: DISCONTINUED | OUTPATIENT
Start: 2024-04-22 | End: 2024-04-23 | Stop reason: HOSPADM

## 2024-04-22 RX ORDER — GADOBUTROL 604.72 MG/ML
2 INJECTION INTRAVENOUS
Status: COMPLETED | OUTPATIENT
Start: 2024-04-22 | End: 2024-04-22

## 2024-04-22 RX ORDER — LIDOCAINE HYDROCHLORIDE 10 MG/ML
5 INJECTION, SOLUTION EPIDURAL; INFILTRATION; INTRACAUDAL; PERINEURAL
Status: DISCONTINUED | OUTPATIENT
Start: 2024-04-22 | End: 2024-04-23 | Stop reason: HOSPADM

## 2024-04-22 RX ADMIN — GADOBUTROL 2 ML: 604.72 INJECTION INTRAVENOUS at 15:35

## 2024-04-22 RX ADMIN — IOHEXOL 75 ML: 300 INJECTION, SOLUTION INTRAVENOUS at 15:35

## 2024-04-29 ENCOUNTER — OFFICE VISIT (OUTPATIENT)
Dept: OBGYN CLINIC | Facility: CLINIC | Age: 29
End: 2024-04-29
Payer: COMMERCIAL

## 2024-04-29 ENCOUNTER — TELEPHONE (OUTPATIENT)
Dept: OBGYN CLINIC | Facility: CLINIC | Age: 29
End: 2024-04-29

## 2024-04-29 VITALS
HEART RATE: 65 BPM | DIASTOLIC BLOOD PRESSURE: 88 MMHG | HEIGHT: 64 IN | SYSTOLIC BLOOD PRESSURE: 120 MMHG | WEIGHT: 105 LBS | BODY MASS INDEX: 17.93 KG/M2

## 2024-04-29 DIAGNOSIS — S43.431A LABRAL TEAR OF SHOULDER, RIGHT, INITIAL ENCOUNTER: Primary | ICD-10-CM

## 2024-04-29 PROCEDURE — 99214 OFFICE O/P EST MOD 30 MIN: CPT | Performed by: STUDENT IN AN ORGANIZED HEALTH CARE EDUCATION/TRAINING PROGRAM

## 2024-04-29 RX ORDER — CHLORHEXIDINE GLUCONATE ORAL RINSE 1.2 MG/ML
15 SOLUTION DENTAL ONCE
OUTPATIENT
Start: 2024-04-29 | End: 2024-04-29

## 2024-04-29 RX ORDER — CEFAZOLIN SODIUM 1 G/50ML
1000 SOLUTION INTRAVENOUS ONCE
OUTPATIENT
Start: 2024-04-29 | End: 2024-04-29

## 2024-04-29 NOTE — PROGRESS NOTES
[facility] Sports Medicine Shoulder New Patient Visit     Assesment:   28 y.o. female with right shoulder pain ongoing for 11 weeks without any known injury. MRI revealed a posterior labral tear and possible HAGL lesion.    Plan:  I reviewed the history, exam, and imaging with the patient in clinic today.  I did review the results of the MR arthrogram with the patient which does confirm a posterior labral tear along with a possible HAGL lesion. On exam, the patient continues to have pain with the load and shift test as well as mild pain with the Jess test. She states that her main complaint is pain rather than instability in the right shoulder.  The patient has tried conservative management including physical therapy and anti-inflammatory medications.  Is also tried activity modification.  He has had no improvement in her symptoms over the past few months. I did discuss potential treatment options with the patient including both conservative and surgical management.  I did discuss that we could try continued conservative management in the form of physical therapy and potentially a corticosteroid injection.  I also discussed surgical intervention that would involve shoulder arthroscopy, labral repair, possible HAGL repair.  I did discuss what surgery would entail as well as the postoperative recovery process.   Patient declined the corticosteroid injection as she feels that it would only provide temporary relief of her symptoms.  She would rather proceed with surgery and that her symptoms have not improved over the past few months with conservative management.  After long discussion with the patient about the risk, benefits, and alternatives of the surgery, the patient was interested in proceeding with surgical intervention.  I discussed with the patient that she would need to get some preoperative labs and PCP clearance prior to surgery. The patient does not have diabetes, does not smoke, does not have a history of blood  clots, does not have a history of anesthesia issues, and does not have any significant medical comorbidities.  Patient demonstrated understanding our discussion was agreed with the plan.  All the questions were answered.  She will reach out to clinic with any questions or concerns anytime.  My surgery scheduler will reach out to her to coordinate preoperative labs and surgery scheduling at her earliest convenience.     I explained to the patient what would be involved in their surgery and their recovery.  I explained that it would be an outpatient procedure as the patient would come in and go home the same day.  The shoulder will be immobilized in a sling postoperatively for approximately 6 weeks.  The patient will be seen for the 1st follow-up visit approximately 1 week following surgery.  At the follow-up visit, I would go over the arthroscopic pictures with the patient and perform a wound check.  The patient would then begin formal physical therapy twice a week to begin passive, careful range of motion, while also working on scapular stabilization exercises.  The patient would be seen at 6 weeks following surgery, after which they will advance in physical therapy to begin performing active assisted range of motion as well as isometric cuff exercises.  The patient would then be seen at approximately 3 months following surgery and at that point we would advance to strengthening exercises.  I discussed with the patient that they can expect a 90-95% recovery by approximately 4-5 months, although it can typically take up to a one year to reach full recovery.  I discussed the risks and benefits to surgery, including but not limited to the risk of infection, the risk of anesthesia, the risk of DVT, the risk of nerve and blood vessel injury, the risk of loose anchor requiring additional surgery, risk of failure of repair, the risk of arthrofibrosis or stiffness requiring additional surgery, the risk of re-injury, and the  risk of limited, delayed or short lasting improvement in symptoms.  The patient demonstrates an understanding of their injury, treatment alternatives and indications for surgery, in addition to the risks and benefits, and what will be involved during their recovery from the surgery.  She wished to proceed with surgery.        Conservative treatment:  Ice to shoulder 1-2 times daily, for 20 minutes at a time.  Continue PT for ROM and strengthening to shoulder, rotator cuff, scapular stabilizers.  Let pain guide return to activities.    Imaging:  All imaging from today was reviewed by myself and explained to the patient.     Injection:  No Injection planned at this time.  May consider future corticosteroid injection. Patient deferred for surgical intervention.    Surgery:   All of the risks and benefits of operative treatment were explained to the patient, as well as the risks and benefits of any alternative treatment options, including nonoperative care. This risks of surgery include, but are not limited to, infection, bleeding, blood clot, damage to nerves/arteries, need for further surgyer, cardiovascular risk, anesthesia risk, and continued pain.  The patient understood this and elects to proceed forward with surgical intervention. We will proceed forward with surgical arthroscopy of the shoulder with labral repair    Follow up:  Return for Postop.      Chief Complaint   Patient presents with    Right Shoulder - Follow-up, Pain         History of Present Illness:  The patient is a 28 y.o. female seen in clinic for a 3 week follow up of the right shoulder to review her recent MRI. Patient states she continues to feel unchanged with both pain in the shoulder and instability. She states she works 6 days a week and has pain with the arm abducted.    Previous HPI:  The patient is a 28 y.o. female seen in clinic for a 6 week follow up of the right shoulder for pain. Patient states she continues to have shoulder pain  despite PT and meloxicam. She state the meloxicam helped with the throbbing pain.  She states she does note burning pain since Sunday when she accidentally applied too much pressure of the right elbow onto the middle console of her truck when backing up. She states the pain is anteriorly along the joint. It originally started about 8 weeks ago. 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 The pain is associated with weakness and popping. Pain does radiate down the arm with overuse. The patient characterizes the intensity of pain as a 7 out of 10 today. The patient states that the pain is interfering with her sleep.  Symptoms are aggravated by overuse. The patient has tried rest, ice and NSAIDs in addition to 4 weeks ok Mobic and PT. Symptoms have worsened since the onset. Patient has no history of prior injury, surgery.    Occupation: [employer]    The patient has the following co-morbidities: n/a    Shoulder Surgical History:  None    Past Medical, Social and Family History:  History reviewed. No pertinent past medical history.  Past Surgical History:   Procedure Laterality Date    FL INJECTION RIGHT SHOULDER (ARTHROGRAM)  4/22/2024     Allergies   Allergen Reactions    Miconazole      Current Outpatient Medications on File Prior to Visit   Medication Sig Dispense Refill    diphenhydrAMINE (BENADRYL) 25 mg tablet Take 25 mg by mouth every 6 (six) hours as needed for itching      fexofenadine (ALLEGRA) 60 MG tablet Take 60 mg by mouth daily      meloxicam (Mobic) 15 mg tablet Take 1 tablet (15 mg total) by mouth daily 30 tablet 0    predniSONE 10 mg tablet Take 3 tabs bid x 2 days, take 2 tabs bid x 2 days, take 1 tab bid x 2 days, take 1 tab daily x 2 days (Patient not taking: Reported on 8/16/2023) 26 tablet 0     No current facility-administered medications on file prior to visit.     Social History     Socioeconomic History    Marital status:  "/Civil Union     Spouse name: Not on file    Number of children: Not on file    Years of education: Not on file    Highest education level: Not on file   Occupational History    Not on file   Tobacco Use    Smoking status: Never     Passive exposure: Never    Smokeless tobacco: Never   Vaping Use    Vaping status: Never Used   Substance and Sexual Activity    Alcohol use: Yes     Comment: rare    Drug use: Never    Sexual activity: Not Currently   Other Topics Concern    Not on file   Social History Narrative    Not on file     Social Determinants of Health     Financial Resource Strain: Not on file   Food Insecurity: Not on file   Transportation Needs: Not on file   Physical Activity: Not on file   Stress: Not on file   Social Connections: Not on file   Intimate Partner Violence: Not on file   Housing Stability: Not on file       I have reviewed the past medical, surgical, social and family history, medications and allergies as documented in the EMR.    Review of systems: ROS is negative other than that noted in the HPI.  Constitutional: Negative for fatigue and fever.      Physical Exam:    Blood pressure 120/88, pulse 65, height 5' 4\" (1.626 m), weight 47.6 kg (105 lb).    General/Constitutional: NAD, well developed, well nourished  HENT: Normocephalic, atraumatic  CV: Intact distal pulses, regular rate  Resp: No respiratory distress or labored breathing  Neuro: Alert and Oriented x 3, no focal deficits  Psych: Normal mood, normal affect, normal judgement, normal behavior  Skin: Warm, dry, no rashes, no erythema      Focused right shoulder exam:  No paracervical tenderness.   No cervical tenderness.   No pain with neck flexion, extension, side-to-side bending, or rotation.     The skin is intact without evidence of erythema or ecchymosis. Symmetric shoulders with no evidence of supraspinatus or infraspinatus muscle atrophy. There is no evidence neurologic medial or lateral scapular winging. Anterior tilt " noted with scapular positioning.    Palpation demonstrates tenderness mildly over the AC joint and coracoid but no tenderness over the SC joint, bilateral clavicle, lateral aspect of the acromion or posterior joint line, or bicipital groove.    Shoulder ROM demonstrates 170 degrees of active passive forward elevation. External rotation with the arms at the side demonstrates 80 degree of active of passive motion.  Internal rotation is to T8.        Strength testing demonstrates 5/5 strength in empty can position (with pain), 5/5 with resisted external rotation with the arm at the side.  5/5 strength of the subscapularis and a negative belly press.  Pain with resisted elbow flexion.     Provocative testing for the following demonstrate-  Impingement- negative Hawkin's impingement test, mildly positive Neer impingement sign.  Biceps- negative [place], mildly positive Speeds test.  Labrum- equivocal [place] test, negative sulcus sign.    Stability- negative apprehension sign and a negative relocation test, positive anterior load and shift, negative posterior load and shift testing, positive Jess test and a negative Jerk test.    Beighton score of 0.    UE NV Exam: +2 Radial pulses bilaterally. Fingers are warm and well-perfused.  Sensation intact to light touch C5-T1 bilaterally, Radial/median/ulnar nerve motor intact      Shoulder Imaging    Radiographs of the right shoulder were obtained on 2/28/24 and reviewed with the patient.  Based on my independent evaluation, the imaging shows no acute osseous abnormalities..    MRI arthrogram of the right shoulder was obtained on 4/22/24 and reviewed with the patient. Per my independent review, the imaging shows evidence of a posterior labral tear.  There is contrast extravasation from the inferior aspect of the joint capsule suggesting a possible inferior glenohumeral labroligamentous complex injury.  No chondrosis noted in the glenohumeral or AC joints.  Long head of the  biceps shows no tendinosis.  No tears or tendinosis noted in any of the rotator cuff tendons.      Scribe Attestation      I,:  Chrystal Mast PA-C am acting as a scribe while in the presence of the attending physician.:       I,:  Zhen Wagner MD personally performed the services described in this documentation    as scribed in my presence.:

## 2024-04-30 ENCOUNTER — PREP FOR PROCEDURE (OUTPATIENT)
Dept: OBGYN CLINIC | Facility: CLINIC | Age: 29
End: 2024-04-30

## 2024-04-30 ENCOUNTER — EVALUATION (OUTPATIENT)
Dept: PHYSICAL THERAPY | Facility: CLINIC | Age: 29
End: 2024-04-30
Payer: COMMERCIAL

## 2024-04-30 DIAGNOSIS — M25.311 DYSKINESIS OF RIGHT SCAPULA: ICD-10-CM

## 2024-04-30 DIAGNOSIS — M25.511 RIGHT SHOULDER PAIN, UNSPECIFIED CHRONICITY: ICD-10-CM

## 2024-04-30 DIAGNOSIS — M75.41 IMPINGEMENT SYNDROME OF RIGHT SHOULDER: Primary | ICD-10-CM

## 2024-04-30 PROCEDURE — 97112 NEUROMUSCULAR REEDUCATION: CPT

## 2024-04-30 PROCEDURE — 97110 THERAPEUTIC EXERCISES: CPT

## 2024-04-30 NOTE — PROGRESS NOTES
Pt Discharge    Today's date: 24  Patient name: Dmitri Sharma  : 1995  MRN: [id number]  Referring provider: Chrystal Mast PA-C  Dx:   Encounter Diagnosis     ICD-10-CM    1. Impingement syndrome of right shoulder  M75.41       2. Right shoulder pain, unspecified chronicity  M25.511       3. Dyskinesis of right scapula  M25.311           Start Time: 1445  Stop Time: 1530  Total time in clinic (min): 45 minutes      Subjective: Dmitri reports that she decided to get surgery next month and The patient is comfortable being discharged to an independent [facility] today.    Objective: See treatment diary below.    Assessment: Dmitri tolerated treatment well with consistent cuing throughout. TE's were performed with increased reps and increased resistance. No new TE's were performed today. Following treatment, the patient demonstrated fatigue and would benefit from continued physical therapy.    Plan: Discharge today.       Precautions: Standard     POC expires Unit limit Auth  expiration date PT/OT + Visit Limit?   24 BOMN 23 12                 Visit/Unit Tracking  AUTH Status:  Date 3/14 3/19 3/21 3/26 3/28 4/2 4/4 4    Approved 12  Used 1 2 3 4 5 6 7 8 9 10 11     Remaining  11 10 9  8 7 6 5 4 3 2 1      Access Code: [email or web address]  URL: [email or web address]  Date: 2024  Prepared by: Peter Marx    Exercises  - Standing Shoulder Internal Rotation with Anchored Resistance  - 1 x daily - 7 x weekly - 3 sets - 10 reps  - Shoulder External Rotation with Anchored Resistance  - 1 x daily - 7 x weekly - 3 sets - 10 reps  - Shoulder Adduction with Anchored Resistance  - 1 x daily - 7 x weekly - 3 sets - 10 reps  - Shoulder extension with resistance - Neutral  - 1 x daily - 7 x weekly - 3 sets - 10 reps  - Standing Shoulder Row with Anchored Resistance  - 1 x daily - 7 x weekly - 3 sets - 10 reps  - Prone Single Arm Shoulder Y  - 1 x daily - 7 x weekly - 3 sets - 10 reps  -  "Prone Shoulder Horizontal Abduction  - 1 x daily - 7 x weekly - 3 sets - 10 reps  - Prone Shoulder Extension - Single Arm  - 1 x daily - 7 x weekly - 3 sets - 10 reps    Manuals 4/30  3/21 3/26 3/28 4/2 4/4 4/9 4/11 4/18   Shoulder R PROM   SC gentle           R median nerve glides.    SC          Manual traction    TS         SOR    TS         IASTM right proximal bicep tendon        JF                  Reassessment          TS   Neuro Re-Ed             Iso scap ret.     20x5\"  NV       Prone I's     20x5\"  NV 2x10       Prone T's     20x5\"  NV 2x10  Held today     Prone Y's      NV 2x10       Prone Rows       NV 2x10 2# 3\" 2x10     Wall walk w/ foam roll lift     10x5\" BL                     D2 flexion with TB              Webslide M, L GTB 3x10   RTB  2x 10 3\"     GTB 3x10  GTB 2x10  [place] 15/11# 3x10     Ther Ex             UBE for posture/cardio 4'/4'   3'/3' 4'/4'  4'/4'  4'/4'  4'/4'  4'/4'  5'/5'  5'/5'    Pulleys       5'  5' 5' 5'  5'   Wall slides - flex             Table slides - flex   10x 5\"     PB 20x5\"       Table slides - abduction   10x 5\"           Table slides - ER gentle   10x 5\"           Supine AROM flexion             Doorway chest stretch     10x10\" R only        Self STM pec minor      3'        [place] adduction         3x10 5# ecc.    TB ER RTB 3x10             TB IR GTB 3x10             TB BL ER             TB Adduction 3x10 GTB            Finger ladder w/ ecc. control      15x5\"       scaption wall slides with TB              Pulleys      5'        UT str    10x10\"          Towel rotation    10x10\"  10x10\"         Towel ext     10x10\"         Median nerve glides on wall             Ulnar nerve glides             Repeated shoulder ext.         2x10     Ther Activity                                       Gait Training                                       Modalities             Mechanical cervical traction     5' 20#        TENS          R arm L4 10'        Peter Morrisonto, " PT  4/30/2024,3:25 PM

## 2024-05-01 ENCOUNTER — OFFICE VISIT (OUTPATIENT)
Dept: INTERNAL MEDICINE CLINIC | Facility: CLINIC | Age: 29
End: 2024-05-01
Payer: COMMERCIAL

## 2024-05-01 ENCOUNTER — APPOINTMENT (OUTPATIENT)
Dept: LAB | Facility: CLINIC | Age: 29
End: 2024-05-01
Payer: COMMERCIAL

## 2024-05-01 VITALS
SYSTOLIC BLOOD PRESSURE: 108 MMHG | OXYGEN SATURATION: 97 % | WEIGHT: 102.4 LBS | HEIGHT: 64 IN | DIASTOLIC BLOOD PRESSURE: 78 MMHG | HEART RATE: 65 BPM | BODY MASS INDEX: 17.48 KG/M2 | TEMPERATURE: 98.5 F

## 2024-05-01 DIAGNOSIS — Z12.4 SCREENING FOR CERVICAL CANCER: ICD-10-CM

## 2024-05-01 DIAGNOSIS — Z13.220 SCREENING FOR HYPERLIPIDEMIA: ICD-10-CM

## 2024-05-01 DIAGNOSIS — Z11.59 NEED FOR HEPATITIS C SCREENING TEST: ICD-10-CM

## 2024-05-01 DIAGNOSIS — E55.9 VITAMIN D DEFICIENCY: ICD-10-CM

## 2024-05-01 DIAGNOSIS — S43.431A LABRAL TEAR OF SHOULDER, RIGHT, INITIAL ENCOUNTER: ICD-10-CM

## 2024-05-01 DIAGNOSIS — Z11.4 SCREENING FOR HIV (HUMAN IMMUNODEFICIENCY VIRUS): ICD-10-CM

## 2024-05-01 DIAGNOSIS — Z00.00 ANNUAL PHYSICAL EXAM: ICD-10-CM

## 2024-05-01 DIAGNOSIS — M25.511 CHRONIC RIGHT SHOULDER PAIN: ICD-10-CM

## 2024-05-01 DIAGNOSIS — G89.29 CHRONIC RIGHT SHOULDER PAIN: ICD-10-CM

## 2024-05-01 DIAGNOSIS — Z13.1 SCREENING FOR DIABETES MELLITUS: ICD-10-CM

## 2024-05-01 DIAGNOSIS — J30.2 SEASONAL ALLERGIES: ICD-10-CM

## 2024-05-01 DIAGNOSIS — Z00.00 ANNUAL PHYSICAL EXAM: Primary | ICD-10-CM

## 2024-05-01 DIAGNOSIS — Z23 ENCOUNTER FOR IMMUNIZATION: ICD-10-CM

## 2024-05-01 PROBLEM — S82.63XA CLOSED FRACTURE OF LATERAL MALLEOLUS: Status: ACTIVE | Noted: 2017-11-06

## 2024-05-01 PROBLEM — R30.0 DYSURIA: Status: RESOLVED | Noted: 2023-08-16 | Resolved: 2024-05-01

## 2024-05-01 PROBLEM — S82.63XA CLOSED FRACTURE OF LATERAL MALLEOLUS: Status: RESOLVED | Noted: 2017-11-06 | Resolved: 2024-05-01

## 2024-05-01 LAB
25(OH)D3 SERPL-MCNC: 31.2 NG/ML (ref 30–100)
APTT PPP: 31 SECONDS (ref 23–37)
CHOLEST SERPL-MCNC: 141 MG/DL
ERYTHROCYTE [DISTWIDTH] IN BLOOD BY AUTOMATED COUNT: 12.2 % (ref 11.6–15.1)
HCT VFR BLD AUTO: 41.1 % (ref 34.8–46.1)
HDLC SERPL-MCNC: 59 MG/DL
HGB BLD-MCNC: 13.9 G/DL (ref 11.5–15.4)
INR PPP: 1.01 (ref 0.84–1.19)
LDLC SERPL CALC-MCNC: 71 MG/DL (ref 0–100)
MCH RBC QN AUTO: 31.7 PG (ref 26.8–34.3)
MCHC RBC AUTO-ENTMCNC: 33.8 G/DL (ref 31.4–37.4)
MCV RBC AUTO: 94 FL (ref 82–98)
NONHDLC SERPL-MCNC: 82 MG/DL
PLATELET # BLD AUTO: 257 THOUSANDS/UL (ref 149–390)
PMV BLD AUTO: 10.7 FL (ref 8.9–12.7)
PROTHROMBIN TIME: 13.2 SECONDS (ref 11.6–14.5)
RBC # BLD AUTO: 4.38 MILLION/UL (ref 3.81–5.12)
TRIGL SERPL-MCNC: 53 MG/DL
WBC # BLD AUTO: 9.96 THOUSAND/UL (ref 4.31–10.16)

## 2024-05-01 PROCEDURE — 99385 PREV VISIT NEW AGE 18-39: CPT | Performed by: NURSE PRACTITIONER

## 2024-05-01 PROCEDURE — 90471 IMMUNIZATION ADMIN: CPT

## 2024-05-01 PROCEDURE — 86803 HEPATITIS C AB TEST: CPT

## 2024-05-01 PROCEDURE — 85730 THROMBOPLASTIN TIME PARTIAL: CPT

## 2024-05-01 PROCEDURE — 80053 COMPREHEN METABOLIC PANEL: CPT

## 2024-05-01 PROCEDURE — 85027 COMPLETE CBC AUTOMATED: CPT

## 2024-05-01 PROCEDURE — 80061 LIPID PANEL: CPT

## 2024-05-01 PROCEDURE — 36415 COLL VENOUS BLD VENIPUNCTURE: CPT

## 2024-05-01 PROCEDURE — 85610 PROTHROMBIN TIME: CPT

## 2024-05-01 PROCEDURE — 87389 HIV-1 AG W/HIV-1&-2 AB AG IA: CPT

## 2024-05-01 PROCEDURE — 83036 HEMOGLOBIN GLYCOSYLATED A1C: CPT

## 2024-05-01 PROCEDURE — 90715 TDAP VACCINE 7 YRS/> IM: CPT

## 2024-05-01 PROCEDURE — 82306 VITAMIN D 25 HYDROXY: CPT

## 2024-05-01 NOTE — PATIENT INSTRUCTIONS
Wellness Visit for Adults   AMBULATORY CARE:   A wellness visit  is when you see your healthcare provider to get screened for health problems. Your healthcare provider will also give you advice on how to stay healthy. Write down your questions so you remember to ask them. Ask your healthcare provider how often you should have a wellness visit.  What happens at a wellness visit:  Your healthcare provider will ask about your health, and your family history of health problems. This includes high blood pressure, heart disease, and cancer. He or she will ask if you have symptoms that concern you, if you smoke, and about your mood. You may also be asked about your intake of medicines, supplements, food, and alcohol. Any of the following may be done:  Your weight  will be checked. Your height may also be checked so your body mass index (BMI) can be calculated. Your BMI shows if you are at a healthy weight.    Your blood pressure  and heart rate will be checked. Your temperature may also be checked.    Blood and urine tests  may be done. Blood tests may be done to check your cholesterol levels. Abnormal cholesterol levels increase your risk for heart disease and stroke. You may also need a blood or urine test to check for diabetes if you are at increased risk. Urine tests may be done to look for signs of an infection or kidney disease.    A physical exam  includes checking your heartbeat and lungs with a stethoscope. Your healthcare provider may also check your skin to look for sun damage.    Screening tests  may be recommended. A screening test is done to check for diseases that may not cause symptoms. The screening tests you may need depend on your age, gender, family history, and lifestyle habits. For example, colorectal screening may be recommended if you are 50 years old or older.    Screening tests you need if you are a woman:   A Pap smear  is used to screen for cervical cancer. Pap smears are usually done every 3 to  5 years depending on your age. You may need them more often if you have had abnormal Pap smear test results in the past. Ask your healthcare provider how often you should have a Pap smear.    A mammogram  is an x-ray of your breasts to screen for breast cancer. Experts recommend mammograms every 2 years starting at age 50 years. You may need a mammogram at age 49 years or younger if you have an increased risk for breast cancer. Talk to your healthcare provider about when you should start having mammograms and how often you need them.    Vaccines you may need:   Get an influenza vaccine  every year. The influenza vaccine protects you from the flu. Several types of viruses cause the flu. The viruses change over time, so new vaccines are made each year.    Get a tetanus-diphtheria (Td) booster vaccine  every 10 years. This vaccine protects you against tetanus and diphtheria. Tetanus is a severe infection that may cause painful muscle spasms and lockjaw. Diphtheria is a severe bacterial infection that causes a thick covering in the back of your mouth and throat.    Get a human papillomavirus (HPV) vaccine  if you are female and aged 19 to 26 or male 19 to 21 and never received it. This vaccine protects you from HPV infection. HPV is the most common infection spread by sexual contact. HPV may also cause vaginal, penile, and anal cancers.    Get a pneumococcal vaccine  if you are aged 65 years or older. The pneumococcal vaccine is an injection given to protect you from pneumococcal disease. Pneumococcal disease is an infection caused by pneumococcal bacteria. The infection may cause pneumonia, meningitis, or an ear infection.    Get a shingles vaccine  if you are 60 or older, even if you have had shingles before. The shingles vaccine is an injection to protect you from the varicella-zoster virus. This is the same virus that causes chickenpox. Shingles is a painful rash that develops in people who had chickenpox or have  been exposed to the virus.    How to eat healthy:  My Plate is a model for planning healthy meals. It shows the types and amounts of foods that should go on your plate. Fruits and vegetables make up about half of your plate, and grains and protein make up the other half. A serving of dairy is included on the side of your plate. The amount of calories and serving sizes you need depends on your age, gender, weight, and height. Examples of healthy foods are listed below:  Eat a variety of vegetables  such as dark green, red, and orange vegetables. You can also include canned vegetables low in sodium (salt) and frozen vegetables without added butter or sauces.    Eat a variety of fresh fruits , canned fruit in 100% juice, frozen fruit, and dried fruit.    Include whole grains.  At least half of the grains you eat should be whole grains. Examples include whole-wheat bread, wheat pasta, brown rice, and whole-grain cereals such as oatmeal.    Eat a variety of protein foods such as seafood (fish and shellfish), lean meat, and poultry without skin (turkey and chicken). Examples of lean meats include pork leg, shoulder, or tenderloin, and beef round, sirloin, tenderloin, and extra lean ground beef. Other protein foods include eggs and egg substitutes, beans, peas, soy products, nuts, and seeds.    Choose low-fat dairy products such as skim or 1% milk or low-fat yogurt, cheese, and cottage cheese.    Limit unhealthy fats  such as butter, hard margarine, and shortening.       Exercise:  Exercise at least 30 minutes per day on most days of the week. Some examples of exercise include walking, biking, dancing, and swimming. You can also fit in more physical activity by taking the stairs instead of the elevator or parking farther away from stores. Include muscle strengthening activities 2 days each week. Regular exercise provides many health benefits. It helps you manage your weight, and decreases your risk for type 2 diabetes,  heart disease, stroke, and high blood pressure. Exercise can also help improve your mood. Ask your healthcare provider about the best exercise plan for you.       General health and safety guidelines:   Do not smoke.  Nicotine and other chemicals in cigarettes and cigars can cause lung damage. Ask your healthcare provider for information if you currently smoke and need help to quit. E-cigarettes or smokeless tobacco still contain nicotine. Talk to your healthcare provider before you use these products.    Limit alcohol.  A drink of alcohol is 12 ounces of beer, 5 ounces of wine, or 1½ ounces of liquor.    Lose weight, if needed.  Being overweight increases your risk of certain health conditions. These include heart disease, high blood pressure, type 2 diabetes, and certain types of cancer.    Protect your skin.  Do not sunbathe or use tanning beds. Use sunscreen with a SPF 15 or higher. Apply sunscreen at least 15 minutes before you go outside. Reapply sunscreen every 2 hours. Wear protective clothing, hats, and sunglasses when you are outside.    Drive safely.  Always wear your seatbelt. Make sure everyone in your car wears a seatbelt. A seatbelt can save your life if you are in an accident. Do not use your cell phone when you are driving. This could distract you and cause an accident. Pull over if you need to make a call or send a text message.    Practice safe sex.  Use latex condoms if are sexually active and have more than one partner. Your healthcare provider may recommend screening tests for sexually transmitted infections (STIs).    Wear helmets, lifejackets, and protective gear.  Always wear a helmet when you ride a bike or motorcycle, go skiing, or play sports that could cause a head injury. Wear protective equipment when you play sports. Wear a lifejacket when you are on a boat or doing water sports.    © Copyright [employer] 2023 Information is for End User's use only and may not be sold, redistributed or  otherwise used for commercial purposes.  The above information is an  only. It is not intended as medical advice for individual conditions or treatments. Talk to your doctor, nurse or pharmacist before following any medical regimen to see if it is safe and effective for you.    Cholesterol and Your Health   AMBULATORY CARE:   Cholesterol  is a waxy, fat-like substance. Your body uses cholesterol to make hormones and new cells, and to protect nerves. Cholesterol is made by your body. It also comes from certain foods you eat, such as meat and dairy products. Your healthcare provider can help you set goals for your cholesterol levels. Your provider can help you create a plan to meet your goals.  Cholesterol level goals:  Your cholesterol level goals depend on your risk for heart disease, your age, and your other health conditions. The following are general guidelines:  Total cholesterol  includes low-density lipoprotein (LDL), high-density lipoprotein (HDL), and triglyceride levels. The total cholesterol level should be lower than 200 mg/dL and is best at about 150 mg/dL.    LDL cholesterol  is called bad cholesterol  because it forms plaque in your arteries. As plaque builds up, your arteries become narrow, and less blood flows through. When plaque decreases blood flow to your heart, you may have chest pain. If plaque completely blocks an artery that brings blood to your heart, you may have a heart attack. Plaque can break off and form blood clots. Blood clots may block arteries in your brain and cause a stroke. The level should be less than 130 mg/dL and is best at about 100 mg/dL.         HDL cholesterol  is called good cholesterol  because it helps remove LDL cholesterol from your arteries. It does this by attaching to LDL cholesterol and carrying it to your liver. Your liver breaks down LDL cholesterol so your body can get rid of it. High levels of HDL cholesterol can help prevent a heart attack and  stroke. Low levels of HDL cholesterol can increase your risk for heart disease, heart attack, and stroke. The level should be at least 40 mg/dL in males or at least 50 mg/dL in females.    Triglycerides  are a type of fat that store energy from foods you eat. High levels of triglycerides also cause plaque buildup. This can increase your risk for a heart attack or stroke. If your triglyceride level is high, your LDL cholesterol level may also be high. The level should be less than 150 mg/dL.    Any of the following can increase your risk for high cholesterol:   Smoking or drinking large amounts of alcohol    Having overweight or obesity, or not getting enough exercise    A medical condition such as hypertension (high blood pressure) or diabetes    A family history of high cholesterol    Age older than 65    What you need to know about having your cholesterol levels checked:  Adults 20 to 45 years of age should have their cholesterol levels checked every 4 to 6 years. Adults 45 years or older should have their cholesterol checked every 1 to 2 years. You may need your cholesterol checked more often, or at a younger age, if you have risk factors for heart disease. You may also need to have your cholesterol checked more often if you have other health conditions, such as diabetes. Blood tests are used to check cholesterol levels. Blood tests measure your levels of triglycerides, LDL cholesterol, and HDL cholesterol.  How healthy fats affect your cholesterol levels:  Healthy fats, also called unsaturated fats, help lower LDL cholesterol and triglyceride levels. Healthy fats include the following:  Monounsaturated fats  are found in foods such as olive oil, canola oil, avocado, nuts, and olives.    Polyunsaturated fats,  such as omega 3 fats, are found in fish, such as salmon, trout, and tuna. They can also be found in plant foods such as flaxseed, walnuts, and soybeans.    How unhealthy fats affect your cholesterol levels:   Unhealthy fats increase LDL cholesterol and triglyceride levels. They are found in foods high in cholesterol, saturated fat, and trans fat:  Cholesterol  is found in eggs, dairy, and meat.    Saturated fat  is found in butter, cheese, ice cream, whole milk, and coconut oil. Saturated fat is also found in meat, such as sausage, hot dogs, and bologna.    Trans fat  is found in liquid oils and is used in fried and baked foods. Foods that contain trans fats include chips, crackers, muffins, sweet rolls, microwave popcorn, and cookies.    Treatment  for high cholesterol will also decrease your risk of heart disease, heart attack, and stroke. Treatment may include any of the following:  Lifestyle changes  may include food, exercise, weight loss, and quitting smoking. You may also need to decrease the amount of alcohol you drink. Your healthcare provider will want you to start with lifestyle changes. Other treatment may be added if lifestyle changes are not enough. Your healthcare provider may recommend you work with a team to manage hyperlipidemia. The team may include medical experts such as a dietitian, an exercise or physical therapist, and a behavior therapist. Your family members may be included in helping you create lifestyle changes.    Medicines  may be given to lower your LDL cholesterol, triglyceride levels, or total cholesterol level. You may need medicines to lower your cholesterol if any of the following is true:    You have a history of stroke, TIA, unstable angina, or a heart attack.    Your LDL cholesterol level is 190 mg/dL or higher.    You are age 40 to 75 years, have diabetes or heart disease risk factors, and your LDL cholesterol is 70 mg/dL or higher.    Supplements  include fish oil, red yeast rice, and garlic. Fish oil may help lower your triglyceride and LDL cholesterol levels. It may also increase your HDL cholesterol level. Red yeast rice may help decrease your total cholesterol level and LDL  cholesterol level. Garlic may help lower your total cholesterol level. Do not take any supplements without talking to your healthcare provider.    Food changes you can make to lower your cholesterol levels:  A dietitian can help you create a healthy eating plan. Your dietitian can show you how to read food labels and choose foods low in saturated fat, trans fats, and cholesterol.     Decrease the total amount of fat you eat.  Choose lean meats, fat-free or 1% fat milk, and low-fat dairy products, such as yogurt and cheese. Try to limit or avoid red meats. Limit or do not eat fried foods or baked goods, such as cookies.    Replace unhealthy fats with healthy fats.  Cook foods in olive oil or canola oil. Choose soft margarines that are low in saturated fat and trans fat. Seeds, nuts, and avocados are other examples of healthy fats.    Eat foods with omega-3 fats.  Examples include salmon, tuna, mackerel, walnuts, and flaxseed. Eat fish 2 times per week. Pregnant women should not eat fish that have high levels of mercury, such as shark, swordfish, and emerald mackerel.         Increase the amount of high-fiber foods you eat.  High-fiber foods can help lower your LDL cholesterol. Aim to get between 20 and 30 grams of fiber each day. Fruits and vegetables are high in fiber. Eat at least 5 servings each day. Other high-fiber foods are whole-grain or whole-wheat breads, pastas, or cereals, and brown rice. Eat 3 ounces of whole-grain foods each day. Increase fiber slowly. You may have abdominal discomfort, bloating, and gas if you add fiber to your diet too quickly.         Eat healthy protein foods.  Examples include low-fat dairy products, skinless chicken and turkey, fish, and nuts.    Limit foods and drinks that are high in sugar.  Your dietitian or healthcare provider can help you create daily limits for high-sugar foods and drinks. The limit may be lower if you have diabetes or another health condition. Limits can also  help you lose weight if needed.  Lifestyle changes you can make to lower your cholesterol levels:   Maintain a healthy weight.  Ask your healthcare provider what a healthy weight is for you. Ask your provider to help you create a weight loss plan if needed. Weight loss can decrease your total cholesterol and triglyceride levels. Weight loss may also help keep your blood pressure at a healthy level.    Be physically active throughout the day.  Physical activity, such as exercise, can help lower your total cholesterol level and maintain a healthy weight. Physical activity can also help increase your HDL cholesterol level. Work with your healthcare provider to create an program that is right for you. Get at least 30 to 40 minutes of moderate physical activity most days of the week. Examples of exercise include brisk walking, swimming, or biking. Also include strength training at least 2 times each week. Your healthcare providers can help you create a physical activity plan.            Do not smoke.  Nicotine and other chemicals in cigarettes and cigars can raise your cholesterol levels. Ask your healthcare provider for information if you currently smoke and need help to quit. E-cigarettes or smokeless tobacco still contain nicotine. Talk to your healthcare provider before you use these products.         Limit or do not drink alcohol.  Alcohol can increase your triglyceride levels. Ask your healthcare provider before you drink alcohol. Ask how much is okay for you to drink in 24 hours or 1 week.    Follow up with your doctor as directed:  Write down your questions so you remember to ask them during your visits.  © Copyright [employer] 2023 Information is for End User's use only and may not be sold, redistributed or otherwise used for commercial purposes.  The above information is an  only. It is not intended as medical advice for individual conditions or treatments. Talk to your doctor, nurse or pharmacist  before following any medical regimen to see if it is safe and effective for you.

## 2024-05-01 NOTE — ASSESSMENT & PLAN NOTE
4/29/2024 orthopedics office note reviewed  right shoulder pain ongoing for 11 weeks without any known injury. MRI revealed a small posterior glenoid labral tear and possible inferior glenohumeral labroligamentous complex injury.   Patient has surgery scheduled for 5/28/2024

## 2024-05-01 NOTE — ASSESSMENT & PLAN NOTE
Lifestyle modification, diet and exercise discussed  Medications discussed and refilled appropriately  Labs discussed and ordered   Hepatitis C screening discussed  HIV screening discussed  Depression screening performed  Anxiety screening performed  BMI discussed  Cervical cancer screening discussed and ordered

## 2024-05-01 NOTE — PROGRESS NOTES
ADULT ANNUAL PHYSICAL  [facility]    NAME: Dmitri Sharma  AGE: 28 y.o. SEX: female  : 1995     DATE: 2024     Assessment and Plan:     Problem List Items Addressed This Visit          Surgery/Wound/Pain    Chronic right shoulder pain     2024 orthopedics office note reviewed  right shoulder pain ongoing for 11 weeks without any known injury. MRI revealed a small posterior glenoid labral tear and possible inferior glenohumeral labroligamentous complex injury.   Patient has surgery scheduled for 2024            Obstetrics/Gynecology    Screening for cervical cancer    Relevant Orders    Ambulatory referral to Obstetrics / Gynecology       Other    Seasonal allergies (Chronic)     Continue daily Allegra         Annual physical exam - Primary     Lifestyle modification, diet and exercise discussed  Medications discussed and refilled appropriately  Labs discussed and ordered   Hepatitis C screening discussed  HIV screening discussed  Depression screening performed  Anxiety screening performed  BMI discussed  Cervical cancer screening discussed and ordered            Relevant Orders    CBC and Platelet    Comprehensive metabolic panel    Encounter for immunization    Relevant Orders    TDAP VACCINE GREATER THAN OR EQUAL TO 8YO IM    Screening for HIV (human immunodeficiency virus)    Relevant Orders    HIV 1/2 AG/AB w Reflex [facility] for 2 yr old and above    Need for hepatitis C screening test    Relevant Orders    Hepatitis C Antibody    Screening for diabetes mellitus    Relevant Orders    Hemoglobin A1C    Screening for hyperlipidemia    Relevant Orders    Lipid panel    Vitamin D deficiency    Relevant Orders    Vitamin D 25 hydroxy     Other Visit Diagnoses       Labral tear of shoulder, right, initial encounter                Immunizations and preventive care screenings were discussed with patient today. Appropriate education was printed on  patient's after visit summary.    Counseling:  Alcohol/drug use: discussed moderation in alcohol intake, the recommendations for healthy alcohol use, and avoidance of illicit drug use.  Dental Health: discussed importance of regular tooth brushing, flossing, and dental visits.  Injury prevention: discussed safety/seat belts, safety helmets, smoke detectors, carbon dioxide detectors, and smoking near bedding or upholstery.  Sexual health: discussed sexually transmitted diseases, partner selection, use of condoms, avoidance of unintended pregnancy, and contraceptive alternatives.  Exercise: the importance of regular exercise/physical activity was discussed. Recommend exercise 3-5 times per week for at least 30 minutes.          Return in about 1 year (around 5/1/2025) for Annual physical.     Chief Complaint:     Chief Complaint   Patient presents with    Establish Care    Physical Exam     annual      History of Present Illness:     Adult Annual Physical   Patient here for a comprehensive physical exam. The patient reports problems - as discussed .    Diet and Physical Activity  Diet/Nutrition: well balanced diet.   Exercise: 5-7 times a week on average.      Depression Screening  PHQ-2/9 Depression Screening    Little interest or pleasure in doing things: 0 - not at all  Feeling down, depressed, or hopeless: 0 - not at all  PHQ-2 Score: 0  PHQ-2 Interpretation: Negative depression screen       General Health  Sleep: sleeps well.   Hearing: normal - bilateral.  Vision: no vision problems.   Dental: regular dental visits.       /GYN Health  Follows with gynecology? no   Last menstrual period: .  Contraceptive method:  . .  History of STDs?: no.     Advanced Care Planning  Do you have an advanced directive? no  Do you have a durable medical power of ? no  ACP document given to the patient? no      Review of Systems:     Review of Systems   Constitutional:  Negative for activity change, chills, fatigue and  fever.   HENT:  Negative for rhinorrhea and sore throat.    Eyes:  Negative for pain.   Respiratory:  Negative for cough and shortness of breath.    Cardiovascular:  Negative for chest pain, palpitations and leg swelling.   Gastrointestinal:  Negative for abdominal pain, constipation, diarrhea, nausea and vomiting.   Genitourinary:  Negative for difficulty urinating, flank pain, frequency and urgency.   Musculoskeletal:  Positive for arthralgias, joint swelling and myalgias. Negative for gait problem.   Skin:  Negative for color change.   Neurological:  Negative for dizziness, weakness, light-headedness and headaches.   Psychiatric/Behavioral:  Negative for sleep disturbance. The patient is not nervous/anxious.    All other systems reviewed and are negative.     Past Medical History:     Past Medical History:   Diagnosis Date    Allergic       Past Surgical History:     Past Surgical History:   Procedure Laterality Date    FL INJECTION RIGHT SHOULDER (ARTHROGRAM)  4/22/2024      Social History:     Social History     Socioeconomic History    Marital status: /Civil Union     Spouse name: None    Number of children: None    Years of education: None    Highest education level: None   Occupational History    None   Tobacco Use    Smoking status: Never     Passive exposure: Never    Smokeless tobacco: Never   Vaping Use    Vaping status: Never Used   Substance and Sexual Activity    Alcohol use: Not Currently     Comment: rare    Drug use: Never    Sexual activity: Not Currently     Partners: Male     Birth control/protection: Abstinence, Condom Male   Other Topics Concern    None   Social History Narrative    None     Social Determinants of Health     Financial Resource Strain: Not on file   Food Insecurity: Not on file   Transportation Needs: Not on file   Physical Activity: Not on file   Stress: Not on file   Social Connections: Not on file   Intimate Partner Violence: Not on file   Housing Stability: Not on  "file      Family History:     Family History   Problem Relation Age of Onset    Hypertension Mother     Alcohol abuse Father     Diabetes Sister     Diabetes Paternal Grandmother     Breast cancer Paternal Grandmother     Diabetes Paternal Grandfather       Current Medications:     Current Outpatient Medications   Medication Sig Dispense Refill    diphenhydrAMINE (BENADRYL) 25 mg tablet Take 25 mg by mouth every 6 (six) hours as needed for itching      fexofenadine (ALLEGRA) 60 MG tablet Take 60 mg by mouth daily       No current facility-administered medications for this visit.      Allergies:     Allergies   Allergen Reactions    Miconazole       Physical Exam:     /78   Pulse 65   Temp 98.5 °F (36.9 °C)   Ht 5' 3.5\" (1.613 m)   Wt 46.4 kg (102 lb 6.4 oz)   SpO2 97%   BMI 17.85 kg/m²     Physical Exam  Vitals and nursing note reviewed.   Constitutional:       General: She is awake. She is not in acute distress.     Appearance: Normal appearance. She is well-developed and overweight.   HENT:      Head: Normocephalic and atraumatic.      Nose: Nose normal.      Mouth/Throat:      Mouth: Mucous membranes are moist.   Eyes:      Conjunctiva/sclera: Conjunctivae normal.   Cardiovascular:      Rate and Rhythm: Normal rate and regular rhythm.      Pulses: Normal pulses.      Heart sounds: Normal heart sounds. No murmur heard.  Pulmonary:      Effort: Pulmonary effort is normal. No respiratory distress.      Breath sounds: Normal breath sounds.   Abdominal:      General: Bowel sounds are normal.      Palpations: Abdomen is soft.      Tenderness: There is no abdominal tenderness.   Musculoskeletal:      Right shoulder: Swelling, tenderness and crepitus present. Decreased range of motion.      Cervical back: Neck supple.      Right lower leg: No edema.      Left lower leg: No edema.   Skin:     General: Skin is warm and dry.   Neurological:      Mental Status: She is alert and oriented to person, place, and " time.   Psychiatric:         Attention and Perception: Attention normal.         Mood and Affect: Mood normal.         Speech: Speech normal.         Behavior: Behavior normal. Behavior is cooperative.          JENNI Santana   [facility]

## 2024-05-02 ENCOUNTER — TELEPHONE (OUTPATIENT)
Dept: INTERNAL MEDICINE CLINIC | Facility: CLINIC | Age: 29
End: 2024-05-02

## 2024-05-02 LAB
EST. AVERAGE GLUCOSE BLD GHB EST-MCNC: 108 MG/DL
HBA1C MFR BLD: 5.4 %
HCV AB SER QL: NORMAL
HIV 1+2 AB+HIV1 P24 AG SERPL QL IA: NORMAL
HIV 2 AB SERPL QL IA: NORMAL
HIV1 AB SERPL QL IA: NORMAL
HIV1 P24 AG SERPL QL IA: NORMAL

## 2024-05-08 LAB
ALBUMIN SERPL BCP-MCNC: 4.5 G/DL (ref 3.5–5)
ALP SERPL-CCNC: 57 U/L (ref 34–104)
ALT SERPL W P-5'-P-CCNC: 18 U/L (ref 7–52)
ANION GAP SERPL CALCULATED.3IONS-SCNC: 8 MMOL/L (ref 4–13)
AST SERPL W P-5'-P-CCNC: 18 U/L (ref 13–39)
BILIRUB SERPL-MCNC: 0.9 MG/DL (ref 0.2–1)
BUN SERPL-MCNC: 10 MG/DL (ref 5–25)
CALCIUM SERPL-MCNC: 9.5 MG/DL (ref 8.4–10.2)
CHLORIDE SERPL-SCNC: 105 MMOL/L (ref 96–108)
CO2 SERPL-SCNC: 26 MMOL/L (ref 21–32)
CREAT SERPL-MCNC: 0.71 MG/DL (ref 0.6–1.3)
GFR SERPL CREATININE-BSD FRML MDRD: 114 ML/MIN/1.73SQ M
GLUCOSE P FAST SERPL-MCNC: 83 MG/DL (ref 65–99)
POTASSIUM SERPL-SCNC: 3.6 MMOL/L (ref 3.5–5.3)
PROT SERPL-MCNC: 7.3 G/DL (ref 6.4–8.4)
SODIUM SERPL-SCNC: 139 MMOL/L (ref 135–147)

## 2024-05-09 ENCOUNTER — CONSULT (OUTPATIENT)
Dept: INTERNAL MEDICINE CLINIC | Facility: CLINIC | Age: 29
End: 2024-05-09
Payer: COMMERCIAL

## 2024-05-09 VITALS
TEMPERATURE: 97.9 F | BODY MASS INDEX: 17.93 KG/M2 | OXYGEN SATURATION: 99 % | SYSTOLIC BLOOD PRESSURE: 110 MMHG | WEIGHT: 105 LBS | HEART RATE: 74 BPM | DIASTOLIC BLOOD PRESSURE: 74 MMHG | HEIGHT: 64 IN

## 2024-05-09 DIAGNOSIS — G89.29 CHRONIC RIGHT SHOULDER PAIN: ICD-10-CM

## 2024-05-09 DIAGNOSIS — Z01.818 VISIT FOR PRE-OPERATIVE EXAMINATION: Primary | ICD-10-CM

## 2024-05-09 DIAGNOSIS — M25.511 CHRONIC RIGHT SHOULDER PAIN: ICD-10-CM

## 2024-05-09 PROCEDURE — 99213 OFFICE O/P EST LOW 20 MIN: CPT | Performed by: INTERNAL MEDICINE

## 2024-05-09 NOTE — PROGRESS NOTES
Assessment/Plan:  Problem List Items Addressed This Visit          Surgery/Wound/Pain    Chronic right shoulder pain     Other Visit Diagnoses       Visit for pre-operative examination    -  Primary             Diagnoses and all orders for this visit:    Visit for pre-operative examination    Chronic right shoulder pain        No problem-specific Assessment & Plan notes found for this encounter.    A/P: PAT tests c/o labs recently were acceptable.. Pt and co-morbidities are stable. Pt is a Wiseman's Class I and carries a cardiac risk of about 1%. Recommend holding any ASA, NSAID's, omega 3, and MVT one week prior to the procedure. Recommend OTC ATC tylenol 48 hours prior to sx to assist with pain management. No other recs at this time.  Thanks and good luck.     Subjective:      Patient ID: Dmitri Sharma is a 29 y.o. female.    WF presents at the request of Dr. Lane for pre-op eval for upcoming right shoulder sx  tentatively scheduled for 5/28/24. Since last visit, doing well and no recent illnesses. Remains active w/o difficulty and no falls. No travel history. Denies depression. No recent illnesses. No fever, chills, or sweats. No unexplained wt changes. Denies CP, SOB, or palpitations. No edema. No orthopnea or PND. No sz or syncope. No changes in bowel or bladder habits. PMH includes chronic shoulder pain, seasonal allergies, and vit d def. . Past sx include shoulder arthrogram  and reports no problems with prior procedures or anesthesia. Denies smoking and rare ETOH use. No history of DVT or PE. NO history of bleeding issues and is not on anti-coagulants. Denies dental plates. Denies C spine issues. No objections to getting blood products if deemed necessary. Had PAT testing done.            The following portions of the patient's history were reviewed and updated as appropriate:   She has a past medical history of Allergic.,  does not have any pertinent problems on file.,   has a past surgical history  that includes FL injection right shoulder (arthrogram) (4/22/2024).,  family history includes Alcohol abuse in her father; Breast cancer in her paternal grandmother; Diabetes in her paternal grandfather, paternal grandmother, and sister; Hypertension in her mother.,   reports that she has never smoked. She has never been exposed to tobacco smoke. She has never used smokeless tobacco. She reports that she does not currently use alcohol. She reports that she does not use drugs.,  is allergic to miconazole..  Current Outpatient Medications   Medication Sig Dispense Refill    diphenhydrAMINE (BENADRYL) 25 mg tablet Take 25 mg by mouth every 6 (six) hours as needed for itching      fexofenadine (ALLEGRA) 60 MG tablet Take 60 mg by mouth daily       No current facility-administered medications for this visit.       Review of Systems   Constitutional:  Positive for activity change. Negative for chills, diaphoresis, fatigue and fever.   HENT: Negative.     Eyes:  Negative for photophobia and visual disturbance.   Respiratory:  Negative for cough, chest tightness, shortness of breath and wheezing.    Cardiovascular:  Negative for chest pain, palpitations and leg swelling.   Gastrointestinal:  Negative for abdominal pain, constipation, diarrhea, nausea and vomiting.   Endocrine: Negative for cold intolerance and heat intolerance.   Genitourinary:  Negative for difficulty urinating, dysuria and frequency.   Musculoskeletal:  Positive for arthralgias, neck pain and neck stiffness. Negative for gait problem and myalgias.   Neurological:  Negative for dizziness, seizures, syncope, weakness, light-headedness and headaches.   Psychiatric/Behavioral:  Negative for confusion, dysphoric mood and sleep disturbance. The patient is not nervous/anxious.        PHQ-2/9 Depression Screening            Objective:  Vitals:    05/09/24 1300   BP: 110/74   BP Location: Left arm   Patient Position: Sitting   Cuff Size: Adult   Pulse: 74   Temp:  "97.9 °F (36.6 °C)   TempSrc: Tympanic   SpO2: 99%   Weight: 47.6 kg (105 lb)   Height: 5' 3.5\" (1.613 m)     Body mass index is 18.31 kg/m².     Physical Exam  Vitals and nursing note reviewed.   Constitutional:       General: She is not in acute distress.     Appearance: Normal appearance. She is not ill-appearing.   HENT:      Head: Normocephalic and atraumatic.      Comments: NO oropharyngeal obstruction.      Mouth/Throat:      Mouth: Mucous membranes are moist.   Eyes:      Extraocular Movements: Extraocular movements intact.      Conjunctiva/sclera: Conjunctivae normal.      Pupils: Pupils are equal, round, and reactive to light.   Neck:      Vascular: No carotid bruit.      Comments: NO C Spine restrictions.   Cardiovascular:      Rate and Rhythm: Normal rate and regular rhythm.      Heart sounds: Normal heart sounds. No murmur heard.  Pulmonary:      Effort: Pulmonary effort is normal. No respiratory distress.      Breath sounds: Normal breath sounds. No wheezing, rhonchi or rales.   Abdominal:      General: Bowel sounds are normal. There is no distension.      Palpations: Abdomen is soft.      Tenderness: There is no abdominal tenderness.   Musculoskeletal:         General: Tenderness present. No swelling.      Cervical back: Normal range of motion and neck supple. No rigidity or tenderness.      Right lower leg: No edema.      Left lower leg: No edema.   Lymphadenopathy:      Cervical: No cervical adenopathy.   Neurological:      General: No focal deficit present.      Mental Status: She is alert and oriented to person, place, and time. Mental status is at baseline.      Cranial Nerves: No cranial nerve deficit.      Sensory: No sensory deficit.      Motor: No weakness.      Coordination: Coordination normal.      Gait: Gait normal.      Deep Tendon Reflexes: Reflexes normal.   Psychiatric:         Mood and Affect: Mood normal.         Behavior: Behavior normal.         Thought Content: Thought content " normal.         Judgment: Judgment normal.

## 2024-05-16 NOTE — PRE-PROCEDURE INSTRUCTIONS
Pre-Surgery Instructions:   Medication Instructions    diphenhydrAMINE (BENADRYL) 25 mg tablet Uses PRN- OK to take day of surgery    fexofenadine (ALLEGRA) 60 MG tablet Take day of surgery.    Medication instructions for day surgery reviewed. Please use only a sip of water to take your instructed medications. Avoid all over the counter vitamins, supplements and NSAIDS for one week prior to surgery per anesthesia guidelines. Tylenol is ok to take as needed.     You will receive a call one business day prior to surgery with an arrival time and hospital directions. If your surgery is scheduled on a Monday, the hospital will be calling you on the Friday prior to your surgery. If you have not heard from anyone by 8pm, please call the hospital supervisor through the hospital  at [phone]. ([place] [phone] or [place] [phone]).    Do not eat or drink anything after midnight the night before your surgery, including candy, mints, lifesavers, or chewing gum. Do not drink alcohol 24hrs before your surgery. Try not to smoke at least 24hrs before your surgery.       Follow the pre surgery showering instructions as listed in the “My Surgical Experience Booklet” or otherwise provided by your surgeon's office. Do not use a blade to shave the surgical area 1 week before surgery. It is okay to use a clean electric clippers up to 24 hours before surgery. Do not apply any lotions, creams, including makeup, cologne, deodorant, or perfumes after showering on the day of your surgery. Do not use dry shampoo, hair spray, hair gel, or any type of hair products.     No contact lenses, eye make-up, or artificial eyelashes. Remove nail polish, including gel polish, and any artificial, gel, or acrylic nails if possible. Remove all jewelry including rings and body piercing jewelry.     Wear causal clothing that is easy to take on and off. Consider your type of surgery.    Keep any valuables, jewelry, piercings at home.  Please bring any specially ordered equipment (sling, braces) if indicated.    Arrange for a responsible person to drive you to and from the hospital on the day of your surgery. Please confirm the visitor policy for the day of your procedure when you receive your phone call with an arrival time.     Call the surgeon's office with any new illnesses, exposures, or additional questions prior to surgery.    Please reference your “My Surgical Experience Booklet” for additional information to prepare for your upcoming surgery.

## 2024-05-23 ENCOUNTER — VBI (OUTPATIENT)
Dept: ADMINISTRATIVE | Facility: OTHER | Age: 29
End: 2024-05-23

## 2024-05-27 ENCOUNTER — ANESTHESIA EVENT (OUTPATIENT)
Dept: PERIOP | Facility: HOSPITAL | Age: 29
End: 2024-05-27
Payer: COMMERCIAL

## 2024-05-27 NOTE — ANESTHESIA PREPROCEDURE EVALUATION
Procedure:  SHOULDER ARTHROSCOPIC REPAIR LABRUM (Right: Shoulder)    Relevant Problems   NEURO/PSYCH   (+) Chronic right shoulder pain      Past Medical History:   Diagnosis Date    Allergic          Physical Exam    Airway    Mallampati score: II  TM Distance: >3 FB  Neck ROM: full     Dental   No notable dental hx     Cardiovascular  Rhythm: regular, Rate: normal    Pulmonary   Breath sounds clear to auscultation    Other Findings  Intercisor Distance > 3cm    post-pubertal.      Anesthesia Plan  ASA Score- 1     Anesthesia Type- general with ASA Monitors.         Additional Monitors:     Airway Plan: ETT.    Comment: Discussed benefits/risks of general anesthesia including possibility of mouth/throat pain, injury to lips/teeth, nausea/vomiting, and surgical pain along with more rare complications such as stroke, MI, pneumonia, aspiration, and injury to blood vessels. All questions answered.    Discussed nerve block to assist with post-operative analgesia. Discussed possibility of uncommon complications including permanent nerve injury, damage to blood vessels, infection local anesthetic toxicity, and nerve block failure. Patient understands and wishes to proceed.         Discussed nerve block to assist with post-operative analgesia. Discussed possibility of uncommon complications including permanent nerve injury, damage to blood vessels, infection local anesthetic toxicity, and nerve block failure. Patient understands and wishes to proceed.       .       Plan Factors-Exercise tolerance (METS): >4 METS.    Chart reviewed. EKG reviewed.  Existing labs reviewed.                   Induction- intravenous.    Postoperative Plan- Plan for postoperative opioid use. Planned trial extubation        Informed Consent- Anesthetic plan and risks discussed with patient.  I personally reviewed this patient with the CRNA. Discussed and agreed on the Anesthesia Plan with the CRNA..

## 2024-05-28 ENCOUNTER — ANESTHESIA (OUTPATIENT)
Dept: PERIOP | Facility: HOSPITAL | Age: 29
End: 2024-05-28
Payer: COMMERCIAL

## 2024-05-28 ENCOUNTER — HOSPITAL ENCOUNTER (OUTPATIENT)
Facility: HOSPITAL | Age: 29
Setting detail: OUTPATIENT SURGERY
Discharge: HOME | End: 2024-05-28
Attending: STUDENT IN AN ORGANIZED HEALTH CARE EDUCATION/TRAINING PROGRAM | Admitting: STUDENT IN AN ORGANIZED HEALTH CARE EDUCATION/TRAINING PROGRAM
Payer: COMMERCIAL

## 2024-05-28 VITALS
TEMPERATURE: 96.9 F | SYSTOLIC BLOOD PRESSURE: 99 MMHG | RESPIRATION RATE: 16 BRPM | HEART RATE: 59 BPM | WEIGHT: 105 LBS | BODY MASS INDEX: 17.93 KG/M2 | OXYGEN SATURATION: 100 % | HEIGHT: 64 IN | DIASTOLIC BLOOD PRESSURE: 58 MMHG

## 2024-05-28 DIAGNOSIS — M25.511 CHRONIC RIGHT SHOULDER PAIN: Primary | ICD-10-CM

## 2024-05-28 DIAGNOSIS — Z98.890 S/P ARTHROSCOPY OF SHOULDER: ICD-10-CM

## 2024-05-28 DIAGNOSIS — G89.29 CHRONIC RIGHT SHOULDER PAIN: Primary | ICD-10-CM

## 2024-05-28 LAB
EXT PREGNANCY TEST URINE: NEGATIVE
EXT. CONTROL: NORMAL

## 2024-05-28 PROCEDURE — C9290 INJ, BUPIVACAINE LIPOSOME: HCPCS | Performed by: STUDENT IN AN ORGANIZED HEALTH CARE EDUCATION/TRAINING PROGRAM

## 2024-05-28 PROCEDURE — NC001 PR NO CHARGE: Performed by: STUDENT IN AN ORGANIZED HEALTH CARE EDUCATION/TRAINING PROGRAM

## 2024-05-28 PROCEDURE — 29806 SHO ARTHRS SRG CAPSULORRAPHY: CPT | Performed by: STUDENT IN AN ORGANIZED HEALTH CARE EDUCATION/TRAINING PROGRAM

## 2024-05-28 PROCEDURE — C1713 ANCHOR/SCREW BN/BN,TIS/BN: HCPCS | Performed by: STUDENT IN AN ORGANIZED HEALTH CARE EDUCATION/TRAINING PROGRAM

## 2024-05-28 PROCEDURE — 81025 URINE PREGNANCY TEST: CPT | Performed by: STUDENT IN AN ORGANIZED HEALTH CARE EDUCATION/TRAINING PROGRAM

## 2024-05-28 PROCEDURE — 99024 POSTOP FOLLOW-UP VISIT: CPT | Performed by: STUDENT IN AN ORGANIZED HEALTH CARE EDUCATION/TRAINING PROGRAM

## 2024-05-28 PROCEDURE — 29806 SHO ARTHRS SRG CAPSULORRAPHY: CPT | Performed by: PHYSICIAN ASSISTANT

## 2024-05-28 DEVICE — SELF BUNCHING KL 1.8 FIBERTAK, SHOULDER
Type: IMPLANTABLE DEVICE | Site: SHOULDER | Status: FUNCTIONAL
Brand: ARTHREX®

## 2024-05-28 RX ORDER — CHLORHEXIDINE GLUCONATE 40 MG/ML
SOLUTION TOPICAL DAILY PRN
Status: DISCONTINUED | OUTPATIENT
Start: 2024-05-28 | End: 2024-05-28 | Stop reason: HOSPADM

## 2024-05-28 RX ORDER — ACETAMINOPHEN 325 MG/1
975 TABLET ORAL ONCE
Status: COMPLETED | OUTPATIENT
Start: 2024-05-28 | End: 2024-05-28

## 2024-05-28 RX ORDER — MAGNESIUM HYDROXIDE 1200 MG/15ML
LIQUID ORAL AS NEEDED
Status: DISCONTINUED | OUTPATIENT
Start: 2024-05-28 | End: 2024-05-28 | Stop reason: HOSPADM

## 2024-05-28 RX ORDER — ONDANSETRON 4 MG/1
4 TABLET, FILM COATED ORAL EVERY 8 HOURS PRN
Qty: 10 TABLET | Refills: 0 | Status: SHIPPED | OUTPATIENT
Start: 2024-05-28

## 2024-05-28 RX ORDER — MIDAZOLAM HYDROCHLORIDE 2 MG/2ML
INJECTION, SOLUTION INTRAMUSCULAR; INTRAVENOUS AS NEEDED
Status: DISCONTINUED | OUTPATIENT
Start: 2024-05-28 | End: 2024-05-28

## 2024-05-28 RX ORDER — FENTANYL CITRATE/PF 50 MCG/ML
50 SYRINGE (ML) INJECTION
Status: DISCONTINUED | OUTPATIENT
Start: 2024-05-28 | End: 2024-05-28 | Stop reason: HOSPADM

## 2024-05-28 RX ORDER — SCOLOPAMINE TRANSDERMAL SYSTEM 1 MG/1
1 PATCH, EXTENDED RELEASE TRANSDERMAL
Status: DISCONTINUED | OUTPATIENT
Start: 2024-05-28 | End: 2024-05-28 | Stop reason: HOSPADM

## 2024-05-28 RX ORDER — LIDOCAINE HYDROCHLORIDE 20 MG/ML
INJECTION, SOLUTION EPIDURAL; INFILTRATION; INTRACAUDAL; PERINEURAL AS NEEDED
Status: DISCONTINUED | OUTPATIENT
Start: 2024-05-28 | End: 2024-05-28

## 2024-05-28 RX ORDER — HYDROMORPHONE HCL/PF 1 MG/ML
0.5 SYRINGE (ML) INJECTION
Status: DISCONTINUED | OUTPATIENT
Start: 2024-05-28 | End: 2024-05-28 | Stop reason: HOSPADM

## 2024-05-28 RX ORDER — BUPIVACAINE HYDROCHLORIDE 5 MG/ML
INJECTION, SOLUTION EPIDURAL; INTRACAUDAL
Status: COMPLETED | OUTPATIENT
Start: 2024-05-28 | End: 2024-05-28

## 2024-05-28 RX ORDER — CEFAZOLIN SODIUM 1 G/50ML
1000 SOLUTION INTRAVENOUS ONCE
Status: COMPLETED | OUTPATIENT
Start: 2024-05-28 | End: 2024-05-28

## 2024-05-28 RX ORDER — FENTANYL CITRATE 50 UG/ML
INJECTION, SOLUTION INTRAMUSCULAR; INTRAVENOUS AS NEEDED
Status: DISCONTINUED | OUTPATIENT
Start: 2024-05-28 | End: 2024-05-28

## 2024-05-28 RX ORDER — ONDANSETRON 2 MG/ML
INJECTION INTRAMUSCULAR; INTRAVENOUS AS NEEDED
Status: DISCONTINUED | OUTPATIENT
Start: 2024-05-28 | End: 2024-05-28

## 2024-05-28 RX ORDER — CHLORHEXIDINE GLUCONATE ORAL RINSE 1.2 MG/ML
15 SOLUTION DENTAL ONCE
Status: COMPLETED | OUTPATIENT
Start: 2024-05-28 | End: 2024-05-28

## 2024-05-28 RX ORDER — ACETAMINOPHEN 500 MG
1000 TABLET ORAL EVERY 8 HOURS
Qty: 168 TABLET | Refills: 0 | Status: SHIPPED | OUTPATIENT
Start: 2024-05-28 | End: 2024-06-25

## 2024-05-28 RX ORDER — SODIUM CHLORIDE, SODIUM LACTATE, POTASSIUM CHLORIDE, CALCIUM CHLORIDE 600; 310; 30; 20 MG/100ML; MG/100ML; MG/100ML; MG/100ML
INJECTION, SOLUTION INTRAVENOUS CONTINUOUS PRN
Status: DISCONTINUED | OUTPATIENT
Start: 2024-05-28 | End: 2024-05-28

## 2024-05-28 RX ORDER — ONDANSETRON 2 MG/ML
4 INJECTION INTRAMUSCULAR; INTRAVENOUS ONCE AS NEEDED
Status: DISCONTINUED | OUTPATIENT
Start: 2024-05-28 | End: 2024-05-28 | Stop reason: HOSPADM

## 2024-05-28 RX ORDER — DEXAMETHASONE SODIUM PHOSPHATE 10 MG/ML
INJECTION, SOLUTION INTRAMUSCULAR; INTRAVENOUS AS NEEDED
Status: DISCONTINUED | OUTPATIENT
Start: 2024-05-28 | End: 2024-05-28

## 2024-05-28 RX ORDER — PROMETHAZINE HYDROCHLORIDE 25 MG/ML
6.25 INJECTION, SOLUTION INTRAMUSCULAR; INTRAVENOUS
Status: DISCONTINUED | OUTPATIENT
Start: 2024-05-28 | End: 2024-05-28 | Stop reason: HOSPADM

## 2024-05-28 RX ORDER — SODIUM CHLORIDE, SODIUM LACTATE, POTASSIUM CHLORIDE, CALCIUM CHLORIDE 600; 310; 30; 20 MG/100ML; MG/100ML; MG/100ML; MG/100ML
125 INJECTION, SOLUTION INTRAVENOUS CONTINUOUS
Status: DISCONTINUED | OUTPATIENT
Start: 2024-05-28 | End: 2024-05-28 | Stop reason: HOSPADM

## 2024-05-28 RX ORDER — PROPOFOL 10 MG/ML
INJECTION, EMULSION INTRAVENOUS AS NEEDED
Status: DISCONTINUED | OUTPATIENT
Start: 2024-05-28 | End: 2024-05-28

## 2024-05-28 RX ORDER — ROCURONIUM BROMIDE 10 MG/ML
INJECTION, SOLUTION INTRAVENOUS AS NEEDED
Status: DISCONTINUED | OUTPATIENT
Start: 2024-05-28 | End: 2024-05-28

## 2024-05-28 RX ORDER — MELOXICAM 15 MG/1
15 TABLET ORAL DAILY
Qty: 28 TABLET | Refills: 0 | Status: SHIPPED | OUTPATIENT
Start: 2024-05-28 | End: 2024-06-25

## 2024-05-28 RX ORDER — OXYCODONE HYDROCHLORIDE 5 MG/1
5 TABLET ORAL EVERY 4 HOURS PRN
Qty: 15 TABLET | Refills: 0 | Status: SHIPPED | OUTPATIENT
Start: 2024-05-28

## 2024-05-28 RX ADMIN — CHLORHEXIDINE GLUCONATE 15 ML: 1.2 RINSE ORAL at 07:08

## 2024-05-28 RX ADMIN — FENTANYL CITRATE 50 MCG: 50 INJECTION, SOLUTION INTRAMUSCULAR; INTRAVENOUS at 07:33

## 2024-05-28 RX ADMIN — PHENYLEPHRINE HYDROCHLORIDE 40 MCG/MIN: 10 INJECTION INTRAVENOUS at 07:51

## 2024-05-28 RX ADMIN — BUPIVACAINE 20 ML: 13.3 INJECTION, SUSPENSION, LIPOSOMAL INFILTRATION at 06:59

## 2024-05-28 RX ADMIN — SUGAMMADEX 100 MG: 100 INJECTION, SOLUTION INTRAVENOUS at 10:29

## 2024-05-28 RX ADMIN — CEFAZOLIN SODIUM 1 MG: 1 SOLUTION INTRAVENOUS at 07:34

## 2024-05-28 RX ADMIN — SCOPALAMINE 1 PATCH: 1 PATCH, EXTENDED RELEASE TRANSDERMAL at 07:08

## 2024-05-28 RX ADMIN — ROCURONIUM BROMIDE 50 MG: 10 INJECTION, SOLUTION INTRAVENOUS at 07:34

## 2024-05-28 RX ADMIN — Medication 4 MCG: at 09:41

## 2024-05-28 RX ADMIN — SODIUM CHLORIDE, SODIUM LACTATE, POTASSIUM CHLORIDE, AND CALCIUM CHLORIDE 125 ML/HR: .6; .31; .03; .02 INJECTION, SOLUTION INTRAVENOUS at 07:00

## 2024-05-28 RX ADMIN — BUPIVACAINE HYDROCHLORIDE 5 ML: 5 INJECTION, SOLUTION EPIDURAL; INTRACAUDAL; PERINEURAL at 06:59

## 2024-05-28 RX ADMIN — ACETAMINOPHEN 975 MG: 325 TABLET ORAL at 07:08

## 2024-05-28 RX ADMIN — PROPOFOL 150 MG: 10 INJECTION, EMULSION INTRAVENOUS at 07:33

## 2024-05-28 RX ADMIN — LIDOCAINE HYDROCHLORIDE 40 MG: 20 INJECTION, SOLUTION EPIDURAL; INFILTRATION; INTRACAUDAL; PERINEURAL at 07:33

## 2024-05-28 RX ADMIN — DEXAMETHASONE SODIUM PHOSPHATE 10 MG: 10 INJECTION, SOLUTION INTRAMUSCULAR; INTRAVENOUS at 07:34

## 2024-05-28 RX ADMIN — ROCURONIUM BROMIDE 10 MG: 10 INJECTION, SOLUTION INTRAVENOUS at 08:34

## 2024-05-28 RX ADMIN — MIDAZOLAM 2 MG: 1 INJECTION INTRAMUSCULAR; INTRAVENOUS at 06:58

## 2024-05-28 RX ADMIN — SODIUM CHLORIDE, SODIUM LACTATE, POTASSIUM CHLORIDE, AND CALCIUM CHLORIDE: .6; .31; .03; .02 INJECTION, SOLUTION INTRAVENOUS at 07:28

## 2024-05-28 RX ADMIN — FENTANYL CITRATE 50 MCG: 50 INJECTION, SOLUTION INTRAMUSCULAR; INTRAVENOUS at 09:01

## 2024-05-28 RX ADMIN — ONDANSETRON 4 MG: 2 INJECTION INTRAMUSCULAR; INTRAVENOUS at 10:15

## 2024-05-28 RX ADMIN — Medication 4 MCG: at 09:46

## 2024-05-28 NOTE — H&P
H&P Exam - Orthopedics   Dmitri Sharma 29 y.o. female MRN: [id number]  Unit/Bed#: OR POOL Encounter: [id number]    Assessment & Plan     Assessment:  28 y.o. female with right shoulder pain ongoing for 11 weeks without any known injury. MRI revealed a posterior labral tear and possible HAGL lesion.     Plan:  Right shoulder arthroscopy, posterior labral repair, possible HAGL repair        History of Present Illness   HPI:  Dmitri Sharma is a 29 y.o. female who presents with right shoulder pain. The pain started in February. 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 The pain is now located anteriorly greater than laterally and is associated with weakness and popping. Pain does radiate down the arm with overuse. The patient characterizes the intensity of pain as a 2 out of 10 today. The patient states that the pain is interfering with her sleep.  Symptoms are aggravated by overuse. The patient has tried rest, ice and NSAIDs. Symptoms have worsened since the onset. Patient has no history of prior injury, surgery.     Occupation: [employer]     The patient has the following co-morbidities: n/a.        Historical Information   Past Medical History:   Diagnosis Date    Allergic      Past Surgical History:   Procedure Laterality Date    FL INJECTION RIGHT SHOULDER (ARTHROGRAM)  4/22/2024     Social History   Social History     Substance and Sexual Activity   Alcohol Use Not Currently    Comment: rare     Social History     Substance and Sexual Activity   Drug Use Never     Social History     Tobacco Use   Smoking Status Never    Passive exposure: Never   Smokeless Tobacco Never     Family History: non-contributory    Meds/Allergies   all medications and allergies reviewed  Allergies   Allergen Reactions    Miconazole Swelling and Rash     Localized Swelling       Objective   Vitals: Blood pressure 115/75, pulse 74, temperature 98.8 °F (37.1 °C),  "temperature source Temporal, resp. rate 14, height 5' 3.5\" (1.613 m), weight 47.6 kg (105 lb), last menstrual period 05/24/2024, SpO2 100%.,Body mass index is 18.31 kg/m².    No intake or output data in the 24 hours ending 05/28/24 0708    No intake/output data recorded.    Invasive Devices       Peripheral Intravenous Line  Duration             Peripheral IV 05/28/24 Distal;Dorsal (posterior);Left Forearm <1 day                    Physical Exam    Focused right shoulder exam:  The skin is intact without evidence of erythema or ecchymosis.     Palpation demonstrates tenderness mildly over the AC joint and coracoid but no tenderness over the SC joint, bilateral clavicle, lateral aspect of the acromion or posterior joint line, or bicipital groove.     Shoulder ROM demonstrates 170 degrees of active passive forward elevation with pain. Pain improves with scapular stabilization during forward elevation. External rotation with the arms at the side demonstrates 80 degree of active of passive motion.  Internal rotation is to T8.         Strength testing demonstrates 5/5 strength in empty can position (with pain), 5/5 with resisted external rotation with the arm at the side.  5/5 strength of the subscapularis and a negative belly press.  Pain with resisted elbow flexion.      Provocative testing for the following demonstrate-  Impingement- negative Hawkin's impingement test, positive Neer impingement sign.  Biceps- negative Ye[place]erson, negative Speeds test.  Labrum- equivocal Leflore test, negative sulcus sign.    Stability- negative apprehension sign and a negative relocation test, positive anterior load and shift, negative posterior load and shift testing, positive Jess test and a negative Jerk test.     Beighton score of 0.     UE NV Exam: +2 Radial pulses bilaterally. Fingers are warm and well-perfused.  Sensation intact to light touch C5-T1 bilaterally, Radial/median/ulnar nerve motor intact        Shoulder Imaging   "   Radiographs of the right shoulder were obtained on 2/28/24 and reviewed with the patient.  Based on my independent evaluation, the imaging shows no acute osseous abnormalities.    MRI arthrogram of the right shoulder was obtained on 4/22/24 and reviewed with the patient. Per my independent review, the imaging shows evidence of a posterior labral tear.  There is contrast extravasation from the inferior aspect of the joint capsule suggesting a possible inferior glenohumeral labroligamentous complex injury.  No chondrosis noted in the glenohumeral or AC joints.  Long head of the biceps shows no tendinosis.  No tears or tendinosis noted in any of the rotator cuff tendons.        Lab Results: I have personally reviewed pertinent lab results.  Imaging: I have personally reviewed pertinent reports.    EKG, Pathology, and Other Studies: I have personally reviewed pertinent reports.

## 2024-05-28 NOTE — DISCHARGE INSTR - AVS FIRST PAGE
POSTOPERATIVE INSTRUCTIONS - SHOULDER SURGERY    MEDICATIONS:  - Resume all home medications unless otherwise instructed by your surgeon.  - Pain Medication:  Oxycodone (5 mg, 1 tablet every 4 hours as needed) and Tylenol (500 mg, 2 tablets every 8 hours for 4 weeks)  - If you were given a regional anesthetic (nerve block), please begin taking the pain medication as soon as you get home, even if you have minimal or no pain.  DO NOT WAIT FOR THE NERVE BLOCK TO WEAR OFF.  - Possible side effects include nausea, constipation, and urinary retention.  If you experience these side effects, please call our office for assistance.  - Pain medication refills are authorized only during office hours (8am-4pm, Mon-Fri).  - Anti-Inflammatory:  Meloxicam (15 mg, 1 tablet daily for 4 weeks)   - TAKE WITH FOOD.  Stop if you experience nausea, reflux, or stomach pain.  - Do not take other anti-inflammatory medications along with meloxicam such as ibuprofen, diclofenac advil, naproxen, etc.  - Nausea Medication:  Zofran (4 mg, take one tablet every 8 hours as needed for nausea or vomiting)    WOUND CARE:  - Maintain your operative dressing. Keep the dressings clean and dry.    - It is normal for the shoulder to bleed and swell following surgery. If blood soaks through the bandage, do not become alarmed, reinforce with additional dressing.  - Dressings should remain in place until your first post-operative appointment. Do not remove any of the dressings prior to the appointment.  - DO NOT place shoulder incisions under water (bath, pool) until given approval by our office.  - Please call our office ([phone]) if you experience either of the following:  - Sudden increase in swelling, redness, or warmth at the surgical site  - Excessive incisional drainage that persists beyond the 3rd day after surgery  - Oral temperature greater than 101 degrees, not relieved with Tylenol  - Shortness of breath, chest pain, nausea, or any other  concerning symptoms  - Severe distal arm pain or significant swelling of the distal arm and/or hand.    SLING:  - Wear your sling at all times, including sleep.  - You may come out of the sling only for hygiene, dressing, and home exercises including elbow range of motion exercises.    ACTIVITY:   - You are non-weightbearing on the operative arm.  - DO NOT lift, carry, push, or pull anything with your operative arm.  - Place a pillow behind the elbow while lying down.  - Sleeping in a more upright position (recliner) may be more comfortable initially.  - Avoid long periods of sitting or long distance traveling for 2 weeks.  - May return to sedentary work ONLY or school 3-4 days after surgery, if pain is tolerable    DRIVING:  - You are not allowed to drive while on narcotic medications (oxycodone) or while in a sling.    DIET:  - Begin with clear liquids and light foods (jellos, soups, etc.)  - Progress to your normal diet if you are not nauseated    SWELLING CONTROL:  - Icing is very important in the initial post-operative period and should begin immediately after surgery.  - Cold Therapy: apply ice (20 min on, 20 min off) as often as you feel is necessary.  - Care should be taken when icing to avoid frostbite to the skin.    PHYSICAL THERAPY:  - While maintaining your elbow by the side, begin elbow, hand, and wrist range of motion exercises 24 hours after surgery.  - Formal physical therapy (PT) typically begins after you are seen at your 1st post-operative appointment 1 week after surgery. A prescription and protocol will be provided at your 1st post-operative visit.  - You can call your preferred physical therapy office to schedule your first visit if you have not done so already.    FOLLOW-UP APPOINTMENT:  - You have your first post-op appointment scheduled for 6/5/24 at the [facility] with Dr. Wagner  - If you do not already have a post-operative appointment scheduled, please contact our office at  [phone] to schedule.  - Typically the 1st post-operative appointment following surgery is 7-10 days following surgery  - At the first post-operative visit, Dr. Wagner will do a wound check, go over therapy protocols and answer any questions you may have.  - If you have any further questions please contact the office.    **EMERGENCIES**  - Contact Dr. Wagner's office at [phone] if any of the following are present:  - Painful swelling or numbness (note that some swelling and numbness is normal)  - Unrelenting pain  - Fever (over 101° - it is normal to have a low-grade fever or chills for the 1st day or 2 following surgery)  - Redness around incisions  - Color change in the operative extremity  - Continuous drainage or bleeding from incision (a small amount of drainage is expected)  - Difficulty breathing  - Excessive nausea/vomiting  - Calf pain  - If you have an emergency after office hours or on the weekend, proceed to the nearest emergency room.

## 2024-05-28 NOTE — ANESTHESIA POSTPROCEDURE EVALUATION
Post-Op Assessment Note    CV Status:  Stable  Pain Score: 0    Pain management: adequate       Mental Status:  Awake   Hydration Status:  Euvolemic   PONV Controlled:  Controlled   Airway Patency:  Patent     Post Op Vitals Reviewed: Yes    No anethesia notable event occurred.    Staff: CRNA               BP   133/80   Temp   97.3   Pulse  103   Resp   16   SpO2   100

## 2024-05-28 NOTE — ANESTHESIA PROCEDURE NOTES
Peripheral Block    Patient location during procedure: pre-op  Start time: 5/28/2024 6:59 AM  Reason for block: at surgeon's request and post-op pain management  Staffing  Performed by: Bill Mercado MD  Authorized by: Bill Mercado MD    Preanesthetic Checklist  Completed: patient identified, IV checked, site marked, risks and benefits discussed, surgical consent, monitors and equipment checked, pre-op evaluation and timeout performed  Peripheral Block  Patient position: supine  Prep: ChloraPrep  Patient monitoring: continuous pulse ox, frequent blood pressure checks and heart rate  Block type: interscalene  Laterality: right  Injection technique: single-shot  Procedures: ultrasound guided, Ultrasound guidance required for the procedure to increase accuracy and safety of medication placement and decrease risk of complications.  Ultrasound permanent image saved  bupivacaine (PF) (MARCAINE) 0.5 % injection 20 mL - Perineural   5 mL - 5/28/2024 6:59:00 AM  Needle  Needle type: Stimuplex   Needle gauge: 20 G  Needle length: 4 in  Needle localization: ultrasound guidance  Assessment  Injection assessment: incremental injection, local visualized surrounding nerve on ultrasound and no paresthesia on injection  Paresthesia pain: none  Post-procedure:  pressure dressing applied  patient tolerated the procedure well with no immediate complications  Additional Notes  Uncomplicated interscalene block  Supplemented with superficial cervical plexus block  Local anesthetic deposited between C5 and C6

## 2024-05-28 NOTE — INTERIM OP NOTE
SHOULDER ARTHROSCOPIC REPAIR LABRUM  Postoperative Note  PATIENT NAME: Dmitri Sharma  : 1995  MRN: [id number]  [facility] OR ROOM 02    Surgery Date: 2024    Preop Diagnosis:  Labral tear of shoulder, right, initial encounter [S43.431A]    Post-Op Diagnosis Codes:     * Labral tear of shoulder, right, initial encounter [S43.431A]    Procedure(s) (LRB):  SHOULDER ARTHROSCOPIC REPAIR LABRUM (Right)    Surgeons and Role:     * Zhen Wagner MD - Primary     * Chrystal Mast PA-C - Assisting    Specimens:  * No specimens in log *    Estimated Blood Loss:   Minimal    Anesthesia Type:   Choice     Findings:   Posterior inferior labral tear at the 7-8 o'clock position     Complications:   None      SIGNATURE: Zhen Wagner MD   DATE: May 28, 2024   TIME: 10:42 AM

## 2024-05-29 NOTE — OP NOTE
OPERATIVE REPORT    PATIENT NAME: Dmitri Sharma   :  1995  MRN: [id number]  Pt Location: [facility] ROOM 02    SURGERY DATE: 2024    SURGEON(S) and ROLE:  Primary: Zhen Wagner MD  Assisting: Chrystal Mast PA-C    PREOPERATIVE DIAGNOSES:  Right shoulder posterior labral tear    POSTOPERATIVE DIAGNOSES:  Same as Preoperative Diagnosis    PROCEDURES:  Right shoulder arthroscopy, posterior labral repair    ANESTHESIA TYPE:  General endotracheal and Interscalene block    ANESTHESIA STAFF:   Anesthesiologist: Bill Mercado MD  CRNA: Carlito Seo CRNA; Екатерина Warner CRNA    ESTIMATED BLOOD LOSS:  25 mL    PERIOPERATIVE ANTIBIOTICS:  cefazolin, 1 gram    IMPLANTS:    Implant Name Type Inv. Item Serial No.  Lot No. LRB No. Used Action   ANCHOR SUT SLF BUNCHING KL 1.8MM FIBERTAK SHOULDER - SNA  ANCHOR SUT SLF BUNCHING KL 1.8MM FIBERTAK SHOULDER NA [employer] [id number] Right 1 Implanted       SPECIMENS: None    OPERATIVE INDICATIONS:  The patient is a 29-year-old female that presented for right shoulder pain.  Patient had an MR arthrogram which showed a posterior inferior labral tear.  The patient was initially treated conservatively with rest, anti-inflammatory medications, and physical therapy without any improvement in her symptoms.  I had a long conversation with the patient regarding treatment options including continued conservative management versus operative intervention.  Given the patient's imaging findings, active lifestyle, and persistent symptoms despite conservative management, the patient opted for surgical intervention. I reviewed the risks, benefits, and alternatives to surgery, including but not limited to the risk of bleeding, infection, neurovascular injury, postoperative stiffness, postoperative DVT, anesthetic complications, risk of persistent shoulder pain or instability, hardware failure, failure of repair, retear of the labrum.  The patient demonstrated  understanding of the injury, treatment alternatives, indications for surgery, risks and benefits, and what is involved with the recovery and wished to proceed with operative intervention.  Verbal and written consent was obtained from the patient.        OPERATIVE FINDINGS:         Arthroscopic evaluation of the right shoulder revealed the following:   Glenoid articular surface: Focal area of grade 2-3 chondral changes near the posterior inferior aspect of the glenoid where the labral tear was identified  Humeral head articular surface: No significant chondral defects.    Labrum: Tear of the posterior labrum extending from 7:00 to 8:00 o'clock that was minimally displaced but unstable to probing. Remainder of the labrum was intact and stable to probing.  Biceps tendon: No fraying, inflammation, or tearing.  Tendon was stable in the groove with tensioning.  Rotator cuff: No fraying or tears of the subscapularis, supraspinatus, infraspinatus, or teres minor noted.   Inferior recess: No loose bodies or ligament disruption. No HAGL lesion noted.        PROCEDURE AND TECHNIQUE:  On the day of surgery, the patient was met in the preoperative holding area and identified by the attending surgeon using multiple patient identifiers. The right upper extremity was marked as the correct extremity by the patient and the attending surgeon. A single-shot nerve block was then provided by Anesthesia and tolerated well by the patient without complication. The patient was then taken back to the operative room, placed on the operative room table. General anesthesia was induced without complication. The patient was then positioned in the beach chair position with all bony prominences well padded and checked and double-checked by the attending surgeon during patient positioning. There was no restraint over the knee or fibular heads and the legs were not exteriorly rotated minimizing any pressure on the peroneal nerves during the case. An  examination under anesthesia of the right upper extremity demonstrated 2+ anterior and 1+ posterior translation with load and shift, with slight increase in anterior translation compared with the contralateral side. Appropriate imaging was then brought up in the room. The right upper extremity was then prepped and draped in standard sterile fashion. A preoperative time-out was then performed involving the surgeon, anesthesia team, and circulating nurse, verifying the correct patient, procedure, laterality, and antibiotic administration, with all members of the operating room staff in agreement.     The anatomic landmarks of the scapular spine, acromion, clavicle, and coracoid process were marked. Subsequently, a posterior portal was marked in the soft recess between the glenoid and humeral head. The posterior portal was established with a scalpel. The arthroscope was introduced through this portal. Under direct visualization, a low anterior portal was established with a localizing needle followed by a scalpel and cannula placement. A probe was then introduced into the low anterior portal. A systematic diagnostic arthroscopy evaluated the following: glenoid articular surface, humeral head articular surface, labrum, biceps tendon, rotator cuff, and inferior recess. Findings of the diagnostic arthroscopy are noted above.     There was no pathology noted in the anterior or superior labrum. The inferior recess was closely inspected with the 30 degree and 70 degree scope and no HAGL lesion was noted with either arthroscope.     Attention was then turned to the posterior labrum.  The camera was introduced into the anterior portal.  A cannula was then placed into the posterior portal to aid with anchor placement and suture management.  There was a tear of the labrum extending from approximately 7 to 8 o'clock. The tear was not significantly displaced but it was unstable to probing.  An elevator was initially used to free up  the labral tissue that was torn to ensure that it was mobile and able to be reduced to the glenoid surface. The glenoid was then debrided with a rasp and torpedo shaver to create a bony bleeding surface for improved healing of the labrum. An accessory posterolateral incision was created for a better trajectory to place the fibertak anchors. The percutaneous curved guide was introduced through the posterolateral accessory portal to place a knotless fibertak anchor at the center of the tear. A suture lasso was used through the posterior portal to pass a nitinol wire through the labral tissue. A portion of  the posterior IGHL was also captured with the lasso to tighten up the posterior aspect of the shoulder to improve stability. The labral tissue was then reduced to the glenoid surface using the knotless mechanism of the fibertak anchors. The probe was reintroduced and used to probe the repaired labrum which showed stable fixation to probing.       There was no additional pathology. All particulate debris was removed. The shoulder was copiously rinsed and then drained. The portals were closed with interrupted 4-0 nylon suture using a simple technique. The skin was cleansed with sterile saline and dried.  Xeroform, 4x4s, ABDs, and tape were used to create a sterile dressing. The patient was then placed in a sling. The patient was awoken from anesthesia and transferred from the operative room to the postoperative care unit without complication.    COMPLICATIONS:  None    PATIENT DISPOSITION:  PACU     I was present for the entire procedure.     NOTE:  The presence of a physician assistant was necessary to help with patient positioning, surgical exposure, wound retraction, wound closure, and other key portions of the procedure.  No qualified resident was available for this case.    SIGNATURE:  Zhen Wagner MD  DATE:  May 28, 2024  TIME:  9:13 PM

## 2024-05-29 NOTE — PROGRESS NOTES
Post Op Check Note        Subjective:  I saw the patient in the PACU and updated the patient that surgery went well without any complications.  I briefly discussed the intra-operative findings and procedure that was performed.  I also called the patient's designated  to update them on the procedure and that the patient was now in the PACU. The patient's pain was well controlled.       Physical Exam:   Right upper extremity  Dressings clean, dry, intact. Sling in place.  Sensation intact to light touch in the median, ulnar, and radial nerves. Decreased sensation in the axillary nerve distribution likely 2/2 nerve block.  Radial/median/ulnar/PIN/AIN nerve motor intact  2+ radial pulse  Fingers are warm and well-perfused.     Assessment:  29 y.o. female status post right shoulder arthroscopy and posterior labral repair     Plan:   Patient was provided with discharge instructions and post-op medications were sent to the designated pharmacy.  The patient has a follow-up appointment scheduled in 1 week.  The patient can reach out to the clinic with any questions or concerns prior to the first postoperative visit.        Zhen Wagner MD

## 2024-05-30 ENCOUNTER — TELEPHONE (OUTPATIENT)
Dept: OBGYN CLINIC | Facility: HOSPITAL | Age: 29
End: 2024-05-30

## 2024-05-30 NOTE — TELEPHONE ENCOUNTER
Caller: Patient    Doctor: Kristy    Reason for call: Patient is requesting a work note excusing her from work. Patient had surgery on 5/28.    Call back#: [phone]

## 2024-05-31 PROBLEM — Z13.220 SCREENING FOR HYPERLIPIDEMIA: Status: RESOLVED | Noted: 2024-05-01 | Resolved: 2024-05-31

## 2024-05-31 PROBLEM — Z13.1 SCREENING FOR DIABETES MELLITUS: Status: RESOLVED | Noted: 2024-05-01 | Resolved: 2024-05-31

## 2024-05-31 PROBLEM — Z11.59 NEED FOR HEPATITIS C SCREENING TEST: Status: RESOLVED | Noted: 2024-05-01 | Resolved: 2024-05-31

## 2024-05-31 PROBLEM — Z11.4 SCREENING FOR HIV (HUMAN IMMUNODEFICIENCY VIRUS): Status: RESOLVED | Noted: 2024-05-01 | Resolved: 2024-05-31

## 2024-05-31 PROBLEM — Z12.4 SCREENING FOR CERVICAL CANCER: Status: RESOLVED | Noted: 2024-05-01 | Resolved: 2024-05-31

## 2024-06-05 ENCOUNTER — OFFICE VISIT (OUTPATIENT)
Dept: OBGYN CLINIC | Facility: CLINIC | Age: 29
End: 2024-06-05

## 2024-06-05 VITALS
BODY MASS INDEX: 18.1 KG/M2 | DIASTOLIC BLOOD PRESSURE: 83 MMHG | HEIGHT: 64 IN | HEART RATE: 97 BPM | WEIGHT: 106 LBS | SYSTOLIC BLOOD PRESSURE: 123 MMHG

## 2024-06-05 DIAGNOSIS — Z98.890 S/P ARTHROSCOPY OF RIGHT SHOULDER: Primary | ICD-10-CM

## 2024-06-05 PROCEDURE — 99024 POSTOP FOLLOW-UP VISIT: CPT | Performed by: STUDENT IN AN ORGANIZED HEALTH CARE EDUCATION/TRAINING PROGRAM

## 2024-06-05 NOTE — PROGRESS NOTES
Shoulder Post Operative Visit     Assesment:   29 y.o. female s/p right shoulder arthroscopy with posterior labral repair, DOS: 5/28/2024    Plan:  The patient is doing well 1 week status post the above procedure.  She states that her average pain is 5 out of 10.  She has been taking pain medications including Tylenol, meloxicam, and oxycodone intermittently for pain control.  She has been compliant with wearing the sling at all times and has remained nonweightbearing on the right upper extremity.  Her incisions appear to be healing well with no signs of infection.  Nylon sutures remain in place for another week.  I discussed that she could start showering but not scrub or soak the incisions.  Discussed with the patient that she should remain in sling for the next week and remain nonweightbearing.  I discussed that I will see her back in 1 week at which point we will remove the nylon sutures.  I discussed that at that point we will start physical therapy.  I did review the arthroscopy pictures with the patient in clinic.  I discussed the intraoperative findings as well as the procedure performed.  Patient demonstrated understanding of the discussion and was in agreement the plan.  All of her questions were answered.  I will see her back in 1 week for repeat evaluation.  She can reach out to clinic with any question concerns anytime.      Post-Operative treatment:  Ice to shoulder 1-2 times daily, for 20 minutes at a time.  Pain medications as needed    Imaging:  Arthroscopy pictures were reviewed with the patient in clinic today.  All questions were answered..    Sling:  at all times other than showering    DVT Prophylaxis:  Ambulation    Follow up:   1 week    Patient was advised that if they have any fevers, chills, chest pain, shortness of breath, redness or drainage from the incision, please let our office know immediately.        Chief Complaint   Patient presents with    Right Shoulder - Post-op       History  "of Present Illness:    The patient is a 29 y.o. female who is being evaluated post operatively 1 week status post right shoulder arthroscopy with posterior labral repair.    Patient states that her average pain is 5 out of 10.  She has been taking Tylenol, meloxicam, and oxycodone for pain control.  She states that she is having difficulty sleeping due to pain and discomfort in the shoulder.  She has been wearing the sling at all times and has remained nonweightbearing on the right upper extremity.  She has not started physical therapy yet.  She is ambulating for DVT prophylaxis.    The patient denies any fevers, chills, calf pain, chest pain/shortness of breath, redness or drainage from the incision.    I have reviewed the past medical, surgical, social and family history, medications and allergies as documented in the EMR.    Review of systems: ROS is negative other than that noted in the HPI.  Constitutional: Negative for fatigue and fever.       Physical Exam:    Blood pressure 123/83, pulse 97, height 5' 3.5\" (1.613 m), weight 48.1 kg (106 lb), last menstrual period 05/24/2024.    General/Constitutional: NAD, well developed, well nourished  HENT: Normocephalic, atraumatic  CV: Intact distal pulses, regular rate  Resp: No respiratory distress or labored breathing  GI: Soft and non-tender   Lymphatic: No lymphadenopathy palpated  Neuro: Alert and Oriented x 3, no focal deficits  Psych: Normal mood, normal affect, normal judgement, normal behavior  Skin: Warm, dry, no rashes, no erythema    right Shoulder focused exam:    Incisions appear to be healing well with no erythema, drainage, or dehiscence.  Surgical dressings were removed.  Nylon sutures will remain in place for another week.  Mild tenderness to palpation around the shoulder.  Range of motion testing deferred  Rotator cuff strength testing deferred  Shoulder stability testing deferred    UE NV Exam:   +2 Radial pulses   Sensation intact to light touch " C5-T1 bilaterally  SILT median/ulnar/radial/axillary distribution  Radial/median/ulnar/PIN/AIN/axillary nerve motor intact      Scribe Attestation      I,:   am acting as a scribe while in the presence of the attending physician.:       I,:   personally performed the services described in this documentation    as scribed in my presence.:

## 2024-06-05 NOTE — LETTER
June 5, 2024     Patient: Dmitri Sharma  YOB: 1995  Date of Visit: 6/5/2024      To Whom it May Concern:    Dmitri Sharma is under my professional care. Dmitri was seen in my office on 6/5/2024. Dmitri should remain out of work until re-evaluated in 6 weeks.    If you have any questions or concerns, please don't hesitate to call.         Sincerely,          Zhen Wagner MD        CC: No Recipients

## 2024-06-14 ENCOUNTER — OFFICE VISIT (OUTPATIENT)
Dept: OBGYN CLINIC | Facility: CLINIC | Age: 29
End: 2024-06-14

## 2024-06-14 VITALS
BODY MASS INDEX: 18.27 KG/M2 | DIASTOLIC BLOOD PRESSURE: 84 MMHG | HEART RATE: 70 BPM | HEIGHT: 64 IN | WEIGHT: 107 LBS | SYSTOLIC BLOOD PRESSURE: 124 MMHG

## 2024-06-14 DIAGNOSIS — Z98.890 S/P ARTHROSCOPY OF RIGHT SHOULDER: Primary | ICD-10-CM

## 2024-06-14 DIAGNOSIS — S43.431A LABRAL TEAR OF SHOULDER, RIGHT, INITIAL ENCOUNTER: ICD-10-CM

## 2024-06-14 PROCEDURE — 99024 POSTOP FOLLOW-UP VISIT: CPT | Performed by: STUDENT IN AN ORGANIZED HEALTH CARE EDUCATION/TRAINING PROGRAM

## 2024-06-14 NOTE — PROGRESS NOTES
Shoulder Post Operative Visit     Assesment:   29 y.o. female s/p right shoulder arthroscopy with posterior labral repair, DOS: 5/28/2024    Plan:  Patient is doing well 2 weeks status post the above procedure.  She states that her pain is improving.  She is still having some difficulty sleeping.  She has been wearing the sling at all times and has been compliant with her nonweightbearing restrictions.  Her incisions have healed well without signs of infection.  I discussed with the patient that she can start physical therapy at this point.  I provided her with a prescription for physical therapy.  I discussed that she could shower but not scrub or soak the incisions for another week.  I recommend that she follow-up in 4 weeks for repeat evaluation.  Patient demonstrated understanding discussion was agreed with plan.  All the question answered.  She will reach out to clinic with any question concerns anytime.      Post-Operative treatment:  Ice to shoulder 1-2 times daily, for 20 minutes at a time.  Pain medications as needed  Begin PT per the posterior labral repair protocol  Sutures removed today in the office    Imaging:  All questions were answered..    Sling:  at all times other than showering    DVT Prophylaxis:  Ambulation    Follow up:   4 weeks    Patient was advised that if they have any fevers, chills, chest pain, shortness of breath, redness or drainage from the incision, please let our office know immediately.        Chief Complaint   Patient presents with    Right Shoulder - Post-op, Pain       History of Present Illness:    The patient is a 29 y.o. female who is being evaluated post operatively 2 weeks status post right shoulder arthroscopy with posterior labral repair.    Patient states that her average pain is 4 out of 10.  She has no pain on average but states when she is in the car for prolonged period and drives over any uneven surfaces she will feel temporary brief pain in the shoulder. She has  "been taking Tylenol, meloxicam, and oxycodone for pain control.  She states that she is having some continued difficulty sleeping due to pain and discomfort in the shoulder.  She has been wearing the sling at all times and has remained nonweightbearing on the right upper extremity.  She has not started physical therapy yet.  She is ambulating for DVT prophylaxis.    The patient denies any fevers, chills, calf pain, chest pain/shortness of breath, redness or drainage from the incision.    I have reviewed the past medical, surgical, social and family history, medications and allergies as documented in the EMR.    Review of systems: ROS is negative other than that noted in the HPI.  Constitutional: Negative for fatigue and fever.       Physical Exam:    Blood pressure 124/84, pulse 70, height 5' 3.5\" (1.613 m), weight 48.5 kg (107 lb), last menstrual period 05/24/2024.    General/Constitutional: NAD, well developed, well nourished  HENT: Normocephalic, atraumatic  CV: Intact distal pulses, regular rate  Resp: No respiratory distress or labored breathing  GI: Soft and non-tender   Lymphatic: No lymphadenopathy palpated  Neuro: Alert and Oriented x 3, no focal deficits  Psych: Normal mood, normal affect, normal judgement, normal behavior  Skin: Warm, dry, no rashes, no erythema    right Shoulder focused exam:    Incisions appear to be healing well with no erythema, drainage, or dehiscence.  Steri-strips were removed today.  Mild tenderness to palpation around the shoulder.  Range of motion testing deferred  Rotator cuff strength testing deferred  Shoulder stability testing deferred    UE NV Exam:   +2 Radial pulses   Sensation intact to light touch C5-T1 bilaterally  SILT median/ulnar/radial/axillary distribution  Radial/median/ulnar/PIN/AIN/axillary nerve motor intact      Scribe Attestation      I,:  Chrystal Mast PA-C am acting as a scribe while in the presence of the attending physician.:       I,:  Zhen Wagner, " MD personally performed the services described in this documentation    as scribed in my presence.:

## 2024-06-19 ENCOUNTER — EVALUATION (OUTPATIENT)
Dept: PHYSICAL THERAPY | Facility: CLINIC | Age: 29
End: 2024-06-19
Payer: OTHER MISCELLANEOUS

## 2024-06-19 DIAGNOSIS — Z98.890 S/P ARTHROSCOPY OF RIGHT SHOULDER: Primary | ICD-10-CM

## 2024-06-19 DIAGNOSIS — S43.431D TEAR OF RIGHT GLENOID LABRUM, SUBSEQUENT ENCOUNTER: ICD-10-CM

## 2024-06-19 PROCEDURE — 97140 MANUAL THERAPY 1/> REGIONS: CPT | Performed by: PHYSICAL THERAPIST

## 2024-06-19 PROCEDURE — 97161 PT EVAL LOW COMPLEX 20 MIN: CPT | Performed by: PHYSICAL THERAPIST

## 2024-06-19 PROCEDURE — 97110 THERAPEUTIC EXERCISES: CPT | Performed by: PHYSICAL THERAPIST

## 2024-06-19 NOTE — PROGRESS NOTES
PT Evaluation     Today's date: 2024  Patient name: Dmitri Sharma  : 1995  MRN: [id number]  Referring provider: Chrystal Mast PA-C  Dx:   Encounter Diagnosis     ICD-10-CM    1. S/P arthroscopy of right shoulder  Z98.890 Ambulatory Referral to Physical Therapy      2. Tear of right glenoid labrum, subsequent encounter  S43.431D Ambulatory Referral to Physical Therapy          Start Time: 1015  Stop Time: 1115  Total time in clinic (min): 60 minutes    Assessment  Impairments: abnormal muscle firing, abnormal or restricted ROM, activity intolerance, impaired physical strength, lacks appropriate home exercise program, pain with function and safety issue    Assessment details: Dmitri Sharma is a 29 y.o. female who presents with c/o chronic right shoulder pain and weakness. Patient failed conservative treatment and has opted to undergo elective surgery. Patient is currently s/p right shoulder arthroscopic labral repair, DOS: 24. Patient's pain is managed with prescription medication and her quality of sleep remains disrupted. Patient has been compliant with sling. Examination findings demonstrate appropriate wound healing and restricted ROM in all planes. Deferred AROM and strength testing secondary to recency of surgery. Patient has been educated in post-op contraindications / precautions and wound care. Patient would benefit from skilled physical therapy to address their aforementioned impairments, improve their level of function and to improve their overall quality of life.  Understanding of Dx/Px/POC: excellent     Prognosis: excellent    Goals  Short Term Goals: to be achieved by 4 weeks  1) Patient to be independent with basic HEP.  2) Decrease pain to 5/10 at its worst.  3) Increase shoulder flexion PROM to 160 deg.  4) Increase shoulder abduction PROM to 160 deg.  5) Increase shoulder ER PROM at 30 deg abduction to 60 deg.  6) Increase shoulder IR PROM at 30 deg abduction to 30 deg.  7)  Patient to report decreased sleep interruption secondary to pain.    Long Term Goals: to be achieved by discharge  1) FOTO equal to or greater than 63.  2) Patient to be independent with comprehensive HEP.  3) Abolish pain for improved quality of life.  4) Increase shoulder ROM to within 5 deg of contralateral UE to improve a/iadls.   5) Increase shoulder strength to 5/5 MMT grade in all planes to improve a/iadls.   6) Patient to report no sleep interruption secondary to pain.  7) Patient to return to full duty at work.     Plan  Patient would benefit from: skilled PT  Planned modality interventions: cryotherapy, hydrotherapy, TENS, thermotherapy: hydrocollator packs and low level laser therapy    Planned therapy interventions: activity modification, ADL retraining, ADL training, behavior modification, body mechanics training, dressing changes, functional ROM exercises, home exercise program, IADL retraining, joint mobilization, manual therapy, massage, neuromuscular re-education, patient education, postural training, self care, strengthening, stretching, therapeutic activities and therapeutic exercise    Frequency: 2-3x week.  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 Patient had been repeatedly lifting, which contributed to pain and weakness. No loss of ROM. Patient referred to physical therapy, which provided partial relief however she continued to have difficulty with work-related activities. Patient denies experiencing instability. Patient did have occasional tingling in her 2-3 digits. Patient ultimately opted to undergo surgical intervention and currently presents s/p right arthroscopic labral repair, DOS: 5/28/24. Patient  "denies experiencing acute post-operative complications. Patient is managing her pain with Meloxicam and Tylenol. Patient primarily has pain when in a car d/t the bouncing and laying flat, which disrupts her sleep. Patient has not experienced any tingling/numbness since her surgery. Patient's next f/u appointment with surgeon is scheduled for .   Patient Goals  Patient goal: \"come out like it never happened\"  Pain  Current pain ratin  At best pain ratin  At worst pain rating: 10  Location: right shoulder: anterior, proximal biceps  Quality: dull ache and sharp    Social Support    Employment status: working ([employer]  - currently out of work)  Treatments  Previous treatment: physical therapy        Objective     Postural Observations  Seated posture: good  Standing posture: good      Observations     Right Shoulder  Positive for incision (clean, well-approximated, no signs of infection ; steri-strips intact).     Active Range of Motion     Right Wrist   Wrist flexion: WFL  Wrist extension: WFl    Additional Active Range of Motion Details  Shoulder AROM deferred secondary to recency of surgery    Passive Range of Motion     Right Shoulder   Flexion: 90 degrees with pain  Abduction: 90 degrees with pain  External rotation 0°: 5 degrees with pain  Internal rotation 0°: Right shoulder passive internal rotation at 0 degrees: to chest.     Right Elbow   Flexion: WFL  Extension: 5 degrees     Strength/Myotome Testing     Additional Strength Details  Deferred UE strength testing secondary to recency of surgery      Flowsheet Rows      Flowsheet Row Most Recent Value   PT/OT G-Codes    Current Score 21   Projected Score 63                 POC expires Unit limit Auth  expiration date PT/OT + Visit Limit?    N/A PENDING BOMN                 Visit/Unit Tracking  AUTH Status:  Date               PENDING Used 1               Remaining                  Precautions: s/p R arthroscopic labral repair (DOS: " 5/28/24)      Manuals 6/19            R shoulder PROM (no IR) per protocol GR            R elbow ROM GR                                      Neuro Re-Ed             Scap squeezes Reviewed                                                                                          Ther Ex             Patient education: pathophysiology, surgical overview, rehab protocol, signs/symptoms of infection, HEP review GR            Pendulums: CW/CCW circles, H, V Reviewed            Elbow flexion, extension AAROM Reviewed            Wrist flexion, extension AROM Reviewed            Gripping Reviewed            Shoulder ER, IR, flex, abd submaximal, pain-free isometrics                                       Ther Activity                                       Gait Training                                       Modalities

## 2024-06-24 ENCOUNTER — OFFICE VISIT (OUTPATIENT)
Dept: PHYSICAL THERAPY | Facility: CLINIC | Age: 29
End: 2024-06-24
Payer: OTHER MISCELLANEOUS

## 2024-06-24 DIAGNOSIS — S43.431D TEAR OF RIGHT GLENOID LABRUM, SUBSEQUENT ENCOUNTER: ICD-10-CM

## 2024-06-24 DIAGNOSIS — Z98.890 S/P ARTHROSCOPY OF RIGHT SHOULDER: Primary | ICD-10-CM

## 2024-06-24 PROCEDURE — 97140 MANUAL THERAPY 1/> REGIONS: CPT

## 2024-06-24 PROCEDURE — 97110 THERAPEUTIC EXERCISES: CPT

## 2024-06-24 PROCEDURE — 97112 NEUROMUSCULAR REEDUCATION: CPT

## 2024-06-26 ENCOUNTER — OFFICE VISIT (OUTPATIENT)
Dept: PHYSICAL THERAPY | Facility: CLINIC | Age: 29
End: 2024-06-26
Payer: OTHER MISCELLANEOUS

## 2024-06-26 DIAGNOSIS — Z98.890 S/P ARTHROSCOPY OF RIGHT SHOULDER: Primary | ICD-10-CM

## 2024-06-26 DIAGNOSIS — S43.431D TEAR OF RIGHT GLENOID LABRUM, SUBSEQUENT ENCOUNTER: ICD-10-CM

## 2024-06-26 PROCEDURE — 97110 THERAPEUTIC EXERCISES: CPT

## 2024-06-26 PROCEDURE — 97140 MANUAL THERAPY 1/> REGIONS: CPT

## 2024-06-26 PROCEDURE — 97112 NEUROMUSCULAR REEDUCATION: CPT

## 2024-06-26 NOTE — PROGRESS NOTES
"Daily Note     Today's date: 2024  Patient name: Dmitri Sharma  : 1995  MRN: [id number]  Referring provider: Chrystal Mast PA-C  Dx:   Encounter Diagnosis     ICD-10-CM    1. S/P arthroscopy of right shoulder  Z98.890       2. Tear of right glenoid labrum, subsequent encounter  S43.431D           Start Time: 1445  Stop Time: 1516  Total time in clinic (min): 31 minutes    Subjective: Pt noted that she has been having some burning  in her R shoulder but no other changes noted. Pt noted that she is having a hard time getting comfortable at night and unable to sleep well.   Follow up with MD in July.     Objective: See treatment diary below      Assessment: Continued with treatment session at this time patient is 4 weeks and 1 day OOS. Tolerated treatment well no progressions noted at this time. Noted challenged with isometrics. Patient exhibited good technique with therapeutic exercises and would benefit from continued PT.     DOMS may be noted s/p treatment session.       Plan: Continue per plan of care.      POC expires Unit limit Auth  expiration date PT/OT + Visit Limit?    N/A PENDING BOMN                 Visit/Unit Tracking  AUTH Status:  Date             PENDING Used 1 2 3              Remaining                  Precautions: s/p R arthroscopic labral repair (DOS: 24)      Manuals           R shoulder PROM (no IR) per protocol GR TS  SC per protocal           R elbow ROM GR TS                                     Neuro Re-Ed             Scap squeezes Reviewed 20x5\"  20x 5\"                                                                                         Ther Ex             Patient education: pathophysiology, surgical overview, rehab protocol, signs/symptoms of infection, HEP review GR            Pendulums: CW/CCW circles, H, V Reviewed HEP            Elbow flexion, extension AAROM Reviewed x30 30x           Wrist flexion, extension AROM Reviewed RTG 30x " "RTG 30x           Gripping Reviewed Blue 50x BTB 20x           Shoulder ER, IR, flex, abd submaximal, pain-free isometrics  20x5\"  15 x5\"                                     Ther Activity                                       Gait Training                                       Modalities             CP    Advised to use as needed.                               "

## 2024-06-27 NOTE — PROGRESS NOTES
"Daily Note    Today's date: 24  Patient name: Dmitri Sharma  : 1995  MRN: [id number]  Referring provider: Chrystal Mast PA-C  Dx:   Encounter Diagnosis     ICD-10-CM    1. S/P arthroscopy of right shoulder  Z98.890       2. Tear of right glenoid labrum, subsequent encounter  S43.431D           Start Time: 1415  Stop Time: 1500  Total time in clinic (min): 45 minutes      Subjective: Dmitri reports no c/o today.  She notes adherence to HEP    Objective: See treatment diary below.    Assessment: Dmitri tolerated treatment well with consistent cuing throughout. TE's were performed with increased reps. New TE's were demonstrated with proper technique, and tolerated well. Following treatment, the patient demonstrated fatigue and would benefit from continued physical therapy.    Plan: Continue per plan of care.  Progress treatment as tolerated.         POC expires Unit limit Auth  expiration date PT/OT + Visit Limit?    N/A PENDING BOMN                 Visit/Unit Tracking  AUTH Status:  Date            PENDING Used 1 2 3  4            Remaining                  Precautions: s/p R arthroscopic labral repair (DOS: 24)      Manuals          R shoulder PROM (no IR) per protocol GR TS  SC TS         R elbow ROM GR TS                                     Neuro Re-Ed             Scap squeezes Reviewed 20x5\"  20x 5\"  20x5\"                                                                                        Ther Ex             Patient education: pathophysiology, surgical overview, rehab protocol, signs/symptoms of infection, HEP review GR   TS         AAROM table slides flex/ab    Nv          Pulleys     5'         Pendulums: CW/CCW circles, H, V Reviewed HEP   2'         Elbow flexion, extension AAROM Reviewed x30 30x           Wrist flexion, extension AROM Reviewed RTG 30x RTG 30x           Gripping Reviewed Blue 50x BTB 20x           Shoulder ER, IR, flex, abd " "submaximal, pain-free isometrics  20x5\"  15 x5\"  20x5\"                                    Ther Activity                                       Gait Training                                       Modalities             CP    Advised to use as needed.  10' post                               Peter Marx, PT  6/28/2024,3:14 PM  "

## 2024-06-28 ENCOUNTER — OFFICE VISIT (OUTPATIENT)
Dept: PHYSICAL THERAPY | Facility: CLINIC | Age: 29
End: 2024-06-28
Payer: OTHER MISCELLANEOUS

## 2024-06-28 DIAGNOSIS — Z98.890 S/P ARTHROSCOPY OF RIGHT SHOULDER: Primary | ICD-10-CM

## 2024-06-28 DIAGNOSIS — S43.431D TEAR OF RIGHT GLENOID LABRUM, SUBSEQUENT ENCOUNTER: ICD-10-CM

## 2024-06-28 PROCEDURE — 97140 MANUAL THERAPY 1/> REGIONS: CPT

## 2024-06-28 PROCEDURE — 97112 NEUROMUSCULAR REEDUCATION: CPT

## 2024-06-28 PROCEDURE — 97110 THERAPEUTIC EXERCISES: CPT

## 2024-07-01 ENCOUNTER — OFFICE VISIT (OUTPATIENT)
Dept: PHYSICAL THERAPY | Facility: CLINIC | Age: 29
End: 2024-07-01
Payer: OTHER MISCELLANEOUS

## 2024-07-01 DIAGNOSIS — S43.431D TEAR OF RIGHT GLENOID LABRUM, SUBSEQUENT ENCOUNTER: ICD-10-CM

## 2024-07-01 DIAGNOSIS — Z98.890 S/P ARTHROSCOPY OF RIGHT SHOULDER: Primary | ICD-10-CM

## 2024-07-01 PROCEDURE — 97110 THERAPEUTIC EXERCISES: CPT

## 2024-07-01 PROCEDURE — 97140 MANUAL THERAPY 1/> REGIONS: CPT

## 2024-07-01 PROCEDURE — 97112 NEUROMUSCULAR REEDUCATION: CPT

## 2024-07-01 NOTE — PROGRESS NOTES
"Daily Note    Today's date: 24  Patient name: Dmitri Sharma  : 1995  MRN: [id number]  Referring provider: Chrystal Mast PA-C  Dx:   Encounter Diagnosis     ICD-10-CM    1. S/P arthroscopy of right shoulder  Z98.890       2. Tear of right glenoid labrum, subsequent encounter  S43.431D                        Subjective: Dmitri reports adherence to HEP. She has stiffness in her shoulder all the time and has some pain this morning. She notes ice does not seem to help.    Objective: See treatment diary below.    Assessment: Dmitir tolerated treatment well with consistent cuing throughout. TE's were performed with increased reps and increased resistance. New TE's were demonstrated with proper technique, and tolerated well. Following treatment, the patient demonstrated fatigue and would benefit from continued physical therapy.    Plan: Continue per plan of care.  Progress treatment as tolerated.         POC expires Unit limit Auth  expiration date PT/OT + Visit Limit?    N/A PENDING BOMN                 Visit/Unit Tracking  AUTH Status:  Date           PENDING Used 1 2 3  4 5           Remaining                  Precautions: s/p R arthroscopic labral repair (DOS: 24)      Manuals         R shoulder PROM (no IR) per protocol GR TS  SC TS TS        R elbow ROM GR TS                                     Neuro Re-Ed             Scap squeezes Reviewed 20x5\"  20x 5\"  20x5\"  20x5\"         Shrug hold and relax     20x5\"                                                                          Ther Ex             Patient education: pathophysiology, surgical overview, rehab protocol, signs/symptoms of infection, HEP review GR   TS         AAROM table slides flex/ab    Nv  20x5\" 3 way        Pulleys     5' 5'        Pendulums: CW/CCW circles, H, V Reviewed HEP   2' HEP         Elbow flexion, extension AAROM Reviewed x30 30x           Wrist flexion, extension " "AROM Reviewed RTG 30x RTG 30x           Gripping Reviewed Blue 50x BTB 20x           Shoulder ER, IR, flex, abd submaximal, pain-free isometrics  20x5\"  15 x5\"  20x5\"  HEP         Finger ladder     15x                     Ther Activity                                       Gait Training                                       Modalities             CP    Advised to use as needed.  10' post                         Peter Marx, PT  7/1/2024,12:49 PM  "

## 2024-07-03 ENCOUNTER — OFFICE VISIT (OUTPATIENT)
Dept: PHYSICAL THERAPY | Facility: CLINIC | Age: 29
End: 2024-07-03
Payer: OTHER MISCELLANEOUS

## 2024-07-03 DIAGNOSIS — S43.431D TEAR OF RIGHT GLENOID LABRUM, SUBSEQUENT ENCOUNTER: ICD-10-CM

## 2024-07-03 DIAGNOSIS — Z98.890 S/P ARTHROSCOPY OF RIGHT SHOULDER: Primary | ICD-10-CM

## 2024-07-03 PROCEDURE — 97112 NEUROMUSCULAR REEDUCATION: CPT

## 2024-07-03 PROCEDURE — 97140 MANUAL THERAPY 1/> REGIONS: CPT

## 2024-07-03 PROCEDURE — 97110 THERAPEUTIC EXERCISES: CPT

## 2024-07-03 PROCEDURE — 97010 HOT OR COLD PACKS THERAPY: CPT

## 2024-07-03 NOTE — PROGRESS NOTES
"Daily Note     Today's date: 7/3/2024  Patient name: Dmitri Sharma  : 1995  MRN: [id number]  Referring provider: Chrystal Mast PA-C  Dx:   Encounter Diagnosis     ICD-10-CM    1. S/P arthroscopy of right shoulder  Z98.890       2. Tear of right glenoid labrum, subsequent encounter  S43.431D           Start Time: 1214  Stop Time: 1300  Total time in clinic (min): 46 minutes    Subjective: Pt noted no pain but still has a lot of stiffness. She noted that she did take the sling off yesterday and is still working on keeping her arm relaxes at her side instead of holding it up.       Objective: See treatment diary below      Assessment:  Continued within protocol for R arthroscopic labral repair (DOS: 24). At this time patient isn 5 weeks and 1 day OOS. T/o session reported normal tightness discomfort.  Tolerated treatment well.  Increase ROM noticed as continuation of pulleys and table slides. Minimal muscle guarding noticed with PROM. Patient exhibited good technique with therapeutic exercises and would benefit from continued PT. S/p treatment session noted having increase in soreness but was able to relieve most with ice applied at the end of treatment session.       Plan: Continue per plan of care.      POC expires Unit limit Auth  expiration date PT/OT + Visit Limit?    N/A PENDING BOMN                 Visit/Unit Tracking  AUTH Status:  Date  7/3         PENDING Used 1 2 3  4 5 6          Remaining                  Precautions: s/p R arthroscopic labral repair (DOS: 24)      Manuals  7/ 7/3       R shoulder PROM (no IR) per protocol GR TS  SC TS TS SC       R elbow ROM GR TS                                     Neuro Re-Ed             Scap squeezes Reviewed 20x5\"  20x 5\"  20x5\"  20x5\"  2x 10 5\"        Shrug hold and relax     20x5\"  20x 5\"                                                                         Ther Ex             Patient education: " "pathophysiology, surgical overview, rehab protocol, signs/symptoms of infection, HEP review GR   TS         AAROM table slides flex/ab    Nv  20x5\" 3 way 20x 5\"        Pulleys     5' 5' 5 min        Pendulums: CW/CCW circles, H, V Reviewed HEP   2' HEP         Elbow flexion, extension AAROM Reviewed x30 30x           Wrist flexion, extension AROM Reviewed RTG 30x RTG 30x           Gripping Reviewed Blue 50x BTB 20x           Shoulder ER, IR, flex, abd submaximal, pain-free isometrics  20x5\"  15 x5\"  20x5\"  HEP         Finger ladder     15x 10x challenging                     Ther Activity                                       Gait Training                                       Modalities             CP    Advised to use as needed.  10' post  10 min                          "

## 2024-07-05 ENCOUNTER — OFFICE VISIT (OUTPATIENT)
Dept: PHYSICAL THERAPY | Facility: CLINIC | Age: 29
End: 2024-07-05
Payer: OTHER MISCELLANEOUS

## 2024-07-05 DIAGNOSIS — S43.431D TEAR OF RIGHT GLENOID LABRUM, SUBSEQUENT ENCOUNTER: ICD-10-CM

## 2024-07-05 DIAGNOSIS — Z98.890 S/P ARTHROSCOPY OF RIGHT SHOULDER: Primary | ICD-10-CM

## 2024-07-05 PROCEDURE — 97010 HOT OR COLD PACKS THERAPY: CPT

## 2024-07-05 PROCEDURE — 97110 THERAPEUTIC EXERCISES: CPT

## 2024-07-05 PROCEDURE — 97140 MANUAL THERAPY 1/> REGIONS: CPT

## 2024-07-05 PROCEDURE — 97112 NEUROMUSCULAR REEDUCATION: CPT

## 2024-07-05 NOTE — PROGRESS NOTES
"Daily Note     Today's date: 2024  Patient name: Dmitri Sharma  : 1995  MRN: [id number]  Referring provider: Chrystal Mast PA-C  Dx:   Encounter Diagnosis     ICD-10-CM    1. S/P arthroscopy of right shoulder  Z98.890       2. Tear of right glenoid labrum, subsequent encounter  S43.431D           Start Time: 0900  Stop Time: 0948  Total time in clinic (min): 48 minutes    Subjective: pt noted that she was at a  party and was moving her arm around more in the pool and may have did too much. Noted increase soreness upon arrival for treatment session.       Objective: See treatment diary below      Assessment: S/P R arthroscopic labral repair (DOS: 24), at this time is 5 weeks and 3 days OOS. Secondary to protocol as well as increase in soreness no progressions noted at this time. Tolerated treatment well with exercises with proper technique demonstrated.  Patient demonstrated fatigue post treatment and exhibited good technique with therapeutic exercises. S/p treatment session noted no immediate changes.    Advised to continue to use CP at home as needed.     Plan: Continue per plan of care.      POC expires Unit limit Auth  expiration date PT/OT + Visit Limit?    N/A PENDING? BOMN                 Visit/Unit Tracking  AUTH Status:  Date  7/3 7/        PENDING? Used 1 2 3  4 5 6 7         Remaining                  Precautions: s/p R arthroscopic labral repair (DOS: 24)      Manuals  7/3 7/5      R shoulder PROM (no IR) per protocol GR TS  SC TS TS SC SC      R elbow ROM GR TS                                     Neuro Re-Ed             Scap squeezes Reviewed 20x5\"  20x 5\"  20x5\"  20x5\"  2x 10 5\"  2x 10 5\"       Shrug hold and relax     20x5\"  20x 5\"  20x 5\"                                                                        Ther Ex             Patient education: pathophysiology, surgical overview, rehab protocol, signs/symptoms of " "infection, HEP review GR   TS         AAROM table slides flex/ab    Nv  20x5\" 3 way 20x 5\"  20x 5\" scaption      Pulleys     5' 5' 5 min  5 min       Pendulums: CW/CCW circles, H, V Reviewed HEP   2' HEP   HEP      Elbow flexion, extension AAROM Reviewed x30 30x     HEP       Wrist flexion, extension AROM Reviewed RTG 30x RTG 30x     HEP       Gripping Reviewed Blue 50x BTB 20x     HEP       Shoulder ER, IR, flex, abd submaximal, pain-free isometrics  20x5\"  15 x5\"  20x5\"  HEP   HEP       Finger ladder     15x 10x challenging  10x                    Ther Activity                                       Gait Training                                       Modalities             CP    Advised to use as needed.  10' post  10 min  10 min                           "

## 2024-07-08 ENCOUNTER — OFFICE VISIT (OUTPATIENT)
Dept: PHYSICAL THERAPY | Facility: CLINIC | Age: 29
End: 2024-07-08
Payer: OTHER MISCELLANEOUS

## 2024-07-08 DIAGNOSIS — S43.431D TEAR OF RIGHT GLENOID LABRUM, SUBSEQUENT ENCOUNTER: ICD-10-CM

## 2024-07-08 DIAGNOSIS — Z98.890 S/P ARTHROSCOPY OF RIGHT SHOULDER: Primary | ICD-10-CM

## 2024-07-08 PROCEDURE — 97110 THERAPEUTIC EXERCISES: CPT

## 2024-07-08 PROCEDURE — 97140 MANUAL THERAPY 1/> REGIONS: CPT

## 2024-07-08 PROCEDURE — 97112 NEUROMUSCULAR REEDUCATION: CPT

## 2024-07-08 NOTE — PROGRESS NOTES
"Daily Note    Today's date: 24  Patient name: Dmitri Sharma  : 1995  MRN: [id number]  Referring provider: Chrystal Mast PA-C  Dx:   Encounter Diagnosis     ICD-10-CM    1. S/P arthroscopy of right shoulder  Z98.890       2. Tear of right glenoid labrum, subsequent encounter  S43.431D           Start Time: 1215  Stop Time: 1300  Total time in clinic (min): 45 minutes      Subjective: Dmitri reports adherence to HEP and she has been using pulleys at home to help loosen up.    Objective: See treatment diary below.    Assessment: Dmitri tolerated treatment well with consistent cuing throughout. TE's were performed with increased reps and increased resistance. New TE's were demonstrated with proper technique, and tolerated well. Following treatment, the patient demonstrated fatigue and would benefit from continued physical therapy.    Plan: Continue per plan of care.  Progress treatment as tolerated.         POC expires Unit limit Auth  expiration date PT/OT + Visit Limit?    N/A PENDING? BOMN                 Visit/Unit Tracking  AUTH Status:  Date  7/3 7/ 7/8       PENDING? Used 1 2 3  4 5 6 7 8        Remaining                  Precautions: s/p R arthroscopic labral repair (DOS: 24)    Access Code: [email or web address]  URL: [email or web address]  Date: 2024  Prepared by: Peter Marx    Manuals  7/3 7/5 7/8     R shoulder PROM (no IR) per protocol GR TS  SC TS TS SC SC TS      R elbow ROM GR TS           Rhythmic stabilization        TS - ER/IR 30\"x3                  Neuro Re-Ed             Scap squeezes Reviewed 20x5\"  20x 5\"  20x5\"  20x5\"  2x 10 5\"  2x 10 5\"       Shrug hold and relax     20x5\"  20x 5\"  20x 5\"       Prone ITY        2x10      Prone Row        2x10                                             Ther Ex             Patient education: pathophysiology, surgical overview, rehab protocol, signs/symptoms of infection, HEP " "review GR   TS         AAROM table slides flex/ab    Nv  20x5\" 3 way 20x 5\"  20x 5\" scaption      Pulleys     5' 5' 5 min  5 min  5'     ER TB         Walk 15x YTB     IR TB        2x10 YTB     Finger ladder     15x 10x challenging  10x  10x5\"                   Ther Activity                                       Gait Training                                       Modalities             CP    Advised to use as needed.  10' post  10 min  10 min                             Peter Marx, PT  7/8/2024,12:36 PM  "

## 2024-07-10 ENCOUNTER — OFFICE VISIT (OUTPATIENT)
Dept: PHYSICAL THERAPY | Facility: CLINIC | Age: 29
End: 2024-07-10
Payer: OTHER MISCELLANEOUS

## 2024-07-10 DIAGNOSIS — Z98.890 S/P ARTHROSCOPY OF RIGHT SHOULDER: Primary | ICD-10-CM

## 2024-07-10 DIAGNOSIS — S43.431D TEAR OF RIGHT GLENOID LABRUM, SUBSEQUENT ENCOUNTER: ICD-10-CM

## 2024-07-10 PROCEDURE — 97112 NEUROMUSCULAR REEDUCATION: CPT

## 2024-07-10 PROCEDURE — 97140 MANUAL THERAPY 1/> REGIONS: CPT

## 2024-07-10 PROCEDURE — 97110 THERAPEUTIC EXERCISES: CPT

## 2024-07-10 NOTE — PROGRESS NOTES
"Daily Note     Today's date: 7/10/2024  Patient name: Dmitri Sharma  : 1995  MRN: [id number]  Referring provider: Chrystal Mast PA-C  Dx:   Encounter Diagnosis     ICD-10-CM    1. S/P arthroscopy of right shoulder  Z98.890       2. Tear of right glenoid labrum, subsequent encounter  S43.431D           Start Time: 1445  Stop Time: 1524  Total time in clinic (min): 39 minutes    Subjective: Pt noted no changes since last treatment and still having general soreness in her R shoulder.       Objective: See treatment diary below      Assessment:  Continued with treatment session s/p R arthroscopic labral repair (DOS: 24). At this time no progressions completed with additional exercises. Minimal ranges noticed with Prone ITY. Verbal cues needed to maintain neutral alignment with IR TB's at this time.  Tolerated treatment well. Patient demonstrated fatigue post treatment, exhibited good technique with therapeutic exercises, and would benefit from continued PT  S/P treatment session noted no immediate changes.    Advised can continue to use CP to help decrease p!/ discomfort and inflammation.     Plan: Continue per plan of care.      POC expires Unit limit Auth  expiration date PT/OT + Visit Limit?    N/A PENDING? BOMN                 Visit/Unit Tracking  AUTH Status:  Date  7/3 7/5 7/8 7/10      PENDING? Used 1 2 3  4 5 6 7 8 9        Remaining                  Precautions: s/p R arthroscopic labral repair (DOS: 24)    Access Code: [email or web address]  URL: [email or web address]  Date: 2024  Prepared by: Peter Marx    Manuals  7/3 7/5 7/8 7/10    R shoulder PROM (no IR) per protocol GR TS  SC TS TS SC SC TS  SC    R elbow ROM GR TS       D/C    Rhythmic stabilization        TS - ER/IR 30\"x3                  Neuro Re-Ed             Scap squeezes Reviewed 20x5\"  20x 5\"  20x5\"  20x5\"  2x 10 5\"  2x 10 5\"       Shrug hold and relax     20x5\"  20x " "5\"  20x 5\"       Prone ITY        2x10  2x10     Prone Row        2x10  2x 10                                            Ther Ex             Patient education: pathophysiology, surgical overview, rehab protocol, signs/symptoms of infection, HEP review GR   TS         AAROM table slides flex/ab    Nv  20x5\" 3 way 20x 5\"  20x 5\" scaption  HEP     Pulleys     5' 5' 5 min  5 min  5' 5 min     ER TB         Walk 15x YTB Walk (1 step) 15x YTB     IR TB        2x10 YTB 2x 10 VC for neutral.     Finger ladder     15x 10x challenging  10x  10x5\"  5\" 10x                  Ther Activity                                       Gait Training                                       Modalities             CP    Advised to use as needed.  10' post  10 min  10 min                               "

## 2024-07-11 ENCOUNTER — OFFICE VISIT (OUTPATIENT)
Dept: OBGYN CLINIC | Facility: CLINIC | Age: 29
End: 2024-07-11

## 2024-07-11 VITALS
BODY MASS INDEX: 18.27 KG/M2 | DIASTOLIC BLOOD PRESSURE: 84 MMHG | HEART RATE: 82 BPM | SYSTOLIC BLOOD PRESSURE: 129 MMHG | HEIGHT: 64 IN | WEIGHT: 107 LBS

## 2024-07-11 DIAGNOSIS — S43.431D LABRAL TEAR OF SHOULDER, RIGHT, SUBSEQUENT ENCOUNTER: ICD-10-CM

## 2024-07-11 DIAGNOSIS — Z98.890 S/P ARTHROSCOPY OF RIGHT SHOULDER: Primary | ICD-10-CM

## 2024-07-11 PROCEDURE — 99024 POSTOP FOLLOW-UP VISIT: CPT | Performed by: STUDENT IN AN ORGANIZED HEALTH CARE EDUCATION/TRAINING PROGRAM

## 2024-07-11 NOTE — LETTER
July 11, 2024     Patient: Dmitri Sharma  YOB: 1995  Date of Visit: 7/11/2024      To Whom it May Concern:    Dmitri Sharma is under my professional care. Dmitri was seen in my office on 7/11/2024. Dmitri is out of work over next 6 weeks. Will re-evaluate in 6 weeks.    If you have any questions or concerns, please don't hesitate to call.         Sincerely,          Zhen Wagner MD        CC: No Recipients

## 2024-07-11 NOTE — PROGRESS NOTES
Shoulder Post Operative Visit     Assesment:     1. S/P arthroscopy of right shoulder        2. Labral tear of shoulder, right, subsequent encounter            29 y.o. female s/p surgical arthroscopy of the right shoulder with postierior labral repair, DOS: 5/28/24    Plan:    Dmitri is doing as expected now 6 weeks status post right shoulder arthroscopy with posterior labral repair.  I did encourage her to gently move her shoulder and continue physical therapy as per protocol.  The patient can discontinue the sling at this point.  With regards to her upcoming car show I did advise her to try driving her stick shift vehicle around her neighborhood or in the parking lot prior to committing to the car show.  Further delineation of care is noted below.    Post-Operative treatment:  Ice to shoulder 1-2 times daily, for 20 minutes at a time.  PT for ROM and strengthening to shoulder, rotator cuff, scapular stabilizers.  D/C sling at this time    Imaging:  No imaging was available for review today.    Follow up:   6 weeks    Patient was advised that if they have any fevers, chills, chest pain, shortness of breath, redness or drainage from the incision, please let our office know immediately.        Chief Complaint   Patient presents with    Right Shoulder - Follow-up       History of Present Illness:    The patient is a right-hand-dominant 29 y.o. female who is being evaluated post operatively 6 weeks status post partial arthroscopy with posterior labral repair.  She is doing okay overall and does have morning pain.  She rates the pain at a 7/10 in the morning but the pain does improve as the day progresses.  She has been compliant with the use of the sling. She is not on any pain medications currently.  She notes that sleeping is difficult as she is typically a side to belly sleeper.  She notes that she does perform therapy exercises 5 times a day.  Today she denies any dense paresthesias into the right upper extremity.  " She has been out of work up to this point.      I have reviewed the past medical, surgical, social and family history, medications and allergies as documented in the EMR.      Review of systems: ROS is negative other than that noted in the HPI.  Constitutional: Negative for fatigue and fever.       Physical Exam:    Blood pressure 129/84, pulse 82, height 5' 3.5\" (1.613 m), weight 48.5 kg (107 lb).    General/Constitutional: NAD, well developed, well nourished  HENT: Normocephalic, atraumatic  CV: Intact distal pulses, regular rate  Resp: No respiratory distress or labored breathing  GI: Soft and non-tender   Lymphatic: No lymphadenopathy palpated  Neuro: Alert and Oriented x 3, no focal deficits  Psych: Normal mood, normal affect, normal judgement, normal behavior  Skin: Warm, dry, no rashes, no erythema    right Shoulder focused exam:    Incisions show no erythema, no drainage, no dehiscence  Tender to palpation: none  ROM: Passive FF: 150, ER 50, IR deferred  Rotator cuff strength: 4/5  Scapular position: normal  Stability: deferred    UE NV Exam:   +2 Radial pulses   Sensation intact to light touch C5-T1 bilaterally, SILT median/ulnar/radial distribution  Radial/median/ulnar/PIN/AIN nerve motor intact      Scribe Attestation      I,:  Sebastian Ruiz am acting as a scribe while in the presence of the attending physician.:       I,:  Zhen Wagner MD personally performed the services described in this documentation    as scribed in my presence.:                  "

## 2024-07-12 ENCOUNTER — APPOINTMENT (OUTPATIENT)
Dept: PHYSICAL THERAPY | Facility: CLINIC | Age: 29
End: 2024-07-12
Payer: OTHER MISCELLANEOUS

## 2024-07-15 ENCOUNTER — OFFICE VISIT (OUTPATIENT)
Dept: PHYSICAL THERAPY | Facility: CLINIC | Age: 29
End: 2024-07-15
Payer: OTHER MISCELLANEOUS

## 2024-07-15 DIAGNOSIS — Z98.890 S/P ARTHROSCOPY OF RIGHT SHOULDER: Primary | ICD-10-CM

## 2024-07-15 DIAGNOSIS — S43.431D TEAR OF RIGHT GLENOID LABRUM, SUBSEQUENT ENCOUNTER: ICD-10-CM

## 2024-07-15 PROCEDURE — 97110 THERAPEUTIC EXERCISES: CPT

## 2024-07-15 PROCEDURE — 97140 MANUAL THERAPY 1/> REGIONS: CPT

## 2024-07-15 PROCEDURE — 97112 NEUROMUSCULAR REEDUCATION: CPT

## 2024-07-15 NOTE — PROGRESS NOTES
"Daily Note    Today's date: 07/15/24  Patient name: Dmitri Sharma  : 1995  MRN: [id number]  Referring provider: Chrystal Mast PA-C  Dx:   Encounter Diagnosis     ICD-10-CM    1. S/P arthroscopy of right shoulder  Z98.890       2. Tear of right glenoid labrum, subsequent encounter  S43.431D           Start Time: 1400  Stop Time: 1445  Total time in clinic (min): 45 minutes      Subjective: Dmitri reports that she is sore and stiff following her car show over the weekend. She was not able to drive her car.    Objective: See treatment diary below.    Assessment: Dmitri tolerated treatment well with consistent cuing throughout. TE's were performed with increased reps and increased resistance. New TE's were demonstrated with proper technique, and tolerated well. Following treatment, the patient demonstrated fatigue and would benefit from continued physical therapy.    Plan: Continue per plan of care.  Progress treatment as tolerated.         POC expires Unit limit Auth  expiration date PT/OT + Visit Limit?    N/A PENDING? BOMN                 Visit/Unit Tracking  AUTH Status:  Date  7/3 7/5 7/8 7/10 7/15     PENDING? Used 1 2 3  4 5 6 7 8 9  10      Remaining                  Precautions: s/p R arthroscopic labral repair (DOS: 24)    Access Code: [email or web address]  URL: [email or web address]  Date: 2024  Prepared by: Peter Marx    Manuals  7/3 75 7/8 7/10 7/15   R shoulder PROM (no IR) per protocol GR TS  SC TS TS SC SC TS  SC TS - progressed   R elbow ROM GR TS       D/C    Rhythmic stabilization        TS - ER/IR 30\"x3  TS ER/IR 30\"x3                Neuro Re-Ed             Scap squeezes Reviewed 20x5\"  20x 5\"  20x5\"  20x5\"  2x 10 5\"  2x 10 5\"       Shrug hold and relax     20x5\"  20x 5\"  20x 5\"       Prone ITY        2x10  2x10  2x10    Prone Row        2x10  2x10  2x10                                           Ther Ex             Patient " "education: pathophysiology, surgical overview, rehab protocol, signs/symptoms of infection, HEP review GR   TS         AAROM table slides flex/ab    Nv  20x5\" 3 way 20x 5\"  20x 5\" scaption  HEP     Pulleys     5' 5' 5 min  5 min  5' 5 min  5'   ER TB         Walk 15x YTB Walk (1 step) 15x YTB  YTB 3x10    IR TB        2x10 YTB 2x 10 VC for neutral.  YTB 3x10    TB Tricep pulldown          2x15 YTB   Finger ladder     15x 10x challenging  10x  10x5\"  5\" 10x                  Ther Activity                                       Gait Training                                       Modalities             CP    Advised to use as needed.  10' post  10 min  10 min                                 Peter Marx, PT  7/15/2024,2:15 PM  "

## 2024-07-17 ENCOUNTER — OFFICE VISIT (OUTPATIENT)
Dept: PHYSICAL THERAPY | Facility: CLINIC | Age: 29
End: 2024-07-17
Payer: OTHER MISCELLANEOUS

## 2024-07-17 DIAGNOSIS — Z98.890 S/P ARTHROSCOPY OF RIGHT SHOULDER: Primary | ICD-10-CM

## 2024-07-17 DIAGNOSIS — S43.431D TEAR OF RIGHT GLENOID LABRUM, SUBSEQUENT ENCOUNTER: ICD-10-CM

## 2024-07-17 PROCEDURE — 97110 THERAPEUTIC EXERCISES: CPT

## 2024-07-17 PROCEDURE — 97112 NEUROMUSCULAR REEDUCATION: CPT

## 2024-07-17 PROCEDURE — 97140 MANUAL THERAPY 1/> REGIONS: CPT

## 2024-07-17 NOTE — PROGRESS NOTES
"Daily Note     Today's date: 2024  Patient name: Dmitri Sharma  : 1995  MRN: [id number]  Referring provider: Chrystal Mast PA-C  Dx:   Encounter Diagnosis     ICD-10-CM    1. S/P arthroscopy of right shoulder  Z98.890       2. Tear of right glenoid labrum, subsequent encounter  S43.431D           Start Time: 1618  Stop Time: 1703  Total time in clinic (min): 45 minutes    Subjective: Pt reports her R shldr was a little sore following the progressions made LV.  Feeling \"ok\" today with some slight soreness still present.       Objective: See treatment diary below      Assessment: Tolerated treatment well. Continued with program as outlined below.  Most challenged today with TB resisted ER demonstrating moderate fatigue following the completion of this exercise.  Most limited PROM into R shldr ABD.  Patient would benefit from continued PT to further improve upon deficits in strength and ROM, and well as maximize overall function, per MD protocol.       Plan: Continue per plan of care.      POC expires Unit limit Auth  expiration date PT/OT + Visit Limit?    N/A PENDING? BOMN                 Visit/Unit Tracking  AUTH Status:  Date 6/19 6/24 6/26 6/28 7/1 7/3 7/5 7/8 7/10 7/15 7/17    PENDING? Used 1 2 3  4 5 6 7 8 9  10 11     Remaining                  Precautions: s/p R arthroscopic labral repair (DOS: 24)    Access Code: [email or web address]  URL: [email or web address]  Date: 2024  Prepared by: Peetr Marx    Manuals  7/3 7 7/8 7/10 7/15   R shoulder PROM (no IR) per protocol AFB   SC TS TS SC SC TS  SC TS - progressed   R elbow ROM         D/C    Rhythmic stabilization AFB ER/IR 30\"x3       TS - ER/IR 30\"x3  TS ER/IR 30\"x3                Neuro Re-Ed             Scap squeezes   20x 5\"  20x5\"  20x5\"  2x 10 5\"  2x 10 5\"       Shrug hold and relax     20x5\"  20x 5\"  20x 5\"       Prone ITY 2x10       2x10  2x10  2x10    Prone Row 2x10       2x10  2x10  2x10           " "                                Ther Ex             Patient education: pathophysiology, surgical overview, rehab protocol, signs/symptoms of infection, HEP review    TS         AAROM table slides flex/ab    Nv  20x5\" 3 way 20x 5\"  20x 5\" scaption  HEP     Pulleys  5'   5' 5' 5 min  5 min  5' 5 min  5'   ER TB  YTB 3x10        Walk 15x YTB Walk (1 step) 15x YTB  YTB 3x10    IR TB YTB 3x10        2x10 YTB 2x 10 VC for neutral.  YTB 3x10    TB Tricep pulldown nv         2x15 YTB   Finger ladder 5\" 10x     15x 10x challenging  10x  10x5\"  5\" 10x                  Ther Activity                                       Gait Training                                       Modalities             CP    Advised to use as needed.  10' post  10 min  10 min                                   "

## 2024-07-19 ENCOUNTER — EVALUATION (OUTPATIENT)
Dept: PHYSICAL THERAPY | Facility: CLINIC | Age: 29
End: 2024-07-19
Payer: OTHER MISCELLANEOUS

## 2024-07-19 DIAGNOSIS — Z98.890 S/P ARTHROSCOPY OF RIGHT SHOULDER: Primary | ICD-10-CM

## 2024-07-19 DIAGNOSIS — S43.431D TEAR OF RIGHT GLENOID LABRUM, SUBSEQUENT ENCOUNTER: ICD-10-CM

## 2024-07-19 PROCEDURE — 97112 NEUROMUSCULAR REEDUCATION: CPT

## 2024-07-19 PROCEDURE — 97140 MANUAL THERAPY 1/> REGIONS: CPT

## 2024-07-19 PROCEDURE — 97110 THERAPEUTIC EXERCISES: CPT

## 2024-07-19 NOTE — PROGRESS NOTES
PT Evaluation     Today's date: 2024  Patient name: Dmitri Sharma  : 1995  MRN: [id number]  Referring provider: Chrystal Mast PA-C  Dx:   Encounter Diagnosis     ICD-10-CM    1. S/P arthroscopy of right shoulder  Z98.890       2. Tear of right glenoid labrum, subsequent encounter  S43.431D           Start Time: 1300  Stop Time: 1345  Total time in clinic (min): 45 minutes    Assessment  Impairments: abnormal muscle firing, abnormal or restricted ROM, activity intolerance, impaired physical strength, lacks appropriate home exercise program, pain with function and safety issue    Assessment details: Dmitri Sharma is a pleasant 29 y.o. female who presents today for a re-evaluation of her R shoulder. Dmitri complains of aching pain in the right shoulder ranging from 0/10 to 7/10. Pain is exacerbated by activity or driving and made better by medication or rest.    The patient demonstrates moderately decreased strength during resisted muscle testing, which has improved from initial evaluation. Pt ROM is moderately decreased with pain at end ranges.    The patient has difficulty with transfers, dressing, hygiene, leisure, athletics, work, and driving. Patient will benefit from continued skilled physical therapy, including therapeutic exercise, stretching, manual therapy, and modalities prn to improve their level of function, to increase overall quality of life, and to address her impairments.    Dmitri has met some of her goals at this time. Pt was instructed on her updated plan of care and wishes to continue therapy.    Understanding of Dx/Px/POC: excellent     Prognosis: excellent    Goals  Short Term Goals: to be achieved by 4 weeks - all goals progressing  1) Patient to be independent with basic HEP.  2) Decrease pain to 5/10 at its worst.  3) Increase shoulder flexion PROM to 160 deg.  4) Increase shoulder abduction PROM to 160 deg.  5) Increase shoulder ER PROM at 30 deg abduction to 60 deg.  6)  "Increase shoulder IR PROM at 30 deg abduction to 30 deg.  7) Patient to report decreased sleep interruption secondary to pain.    Long Term Goals: to be achieved by discharge - goals not met at this time  1) FOTO equal to or greater than 63.  2) Patient to be independent with comprehensive HEP.  3) Abolish pain for improved quality of life.  4) Increase shoulder ROM to within 5 deg of contralateral UE to improve a/iadls.   5) Increase shoulder strength to 5/5 MMT grade in all planes to improve a/iadls.   6) Patient to report no sleep interruption secondary to pain.  7) Patient to return to full duty at work.     Plan  Patient would benefit from: skilled PT  Planned modality interventions: cryotherapy, hydrotherapy, TENS, thermotherapy: hydrocollator packs and low level laser therapy    Planned therapy interventions: activity modification, ADL retraining, ADL training, behavior modification, body mechanics training, dressing changes, functional ROM exercises, home exercise program, IADL retraining, joint mobilization, manual therapy, massage, neuromuscular re-education, patient education, postural training, self care, strengthening, stretching, therapeutic activities and therapeutic exercise    Frequency: 2-3x week.  Duration in weeks: 12  Plan of Care beginning date: 2024  Plan of Care expiration date: 2024  Treatment plan discussed with: patient        Subjective Evaluation    History of Present Illness  Mechanism of injury: Dmitri reports that she is feeling better since starting therapy a month ago.    she notes some continued difficulty with her usual tasks including reaching overhead, driving, and lifting her arm.     she notes no pain at this time, but does have pain with certain motions.     she notes adherence to HEP and would like to continue with therapy.    Patient Goals  Patient goal: \"come out like it never happened\"  Pain  Current pain ratin  At best pain ratin  At worst pain rating: " "7  Quality: dull ache    Social Support    Employment status: working ([employer]  - currently out of work)  Treatments  Previous treatment: physical therapy        Objective     Observations     Right Shoulder  Right shoulder incision: clean, well-approximated, no signs of infection ; steri-strips intact.    Active Range of Motion   Left Shoulder   Normal active range of motion    Right Shoulder   Flexion: 90 degrees   Abduction: 70 degrees   External rotation BTH: C3     Right Wrist   Wrist flexion: WFL  Wrist extension: WFl    Passive Range of Motion     Right Shoulder   Flexion: 145 degrees   Abduction: 110 degrees   External rotation 90°: 65 degrees     Right Elbow   Flexion: WFL  Extension: 5 degrees     Strength/Myotome Testing     Left Shoulder   Normal muscle strength    Right Shoulder     Planes of Motion   Flexion: 4-   Abduction: 3+   External rotation at 0°: 4-   Internal rotation at 0°: 4     Isolated Muscles   Biceps: 4   Triceps: 4-                POC expires Unit limit Auth  expiration date PT/OT + Visit Limit?   9/11 N/A PENDING? BOMN                 Visit/Unit Tracking  AUTH Status:  Date 6/19 6/24 6/26 6/28 7/1 7/3 7/5 7/8 7/10 7/15 7/17 7/19   PENDING? Used 1 2 3  4 5 6 7 8 9  10 11 12    Remaining                  Precautions: s/p R arthroscopic labral repair (DOS: 5/28/24)    Access Code: [email or web address]  URL: [email or web address]  Date: 07/08/2024  Prepared by: Peter Marx    Manuals 7/17 7/19 6/28 7/1 7/3 7/5 7/8 7/10 7/15   R shoulder PROM (no IR) per protocol AFB TS  TS TS SC SC TS  SC TS - progressed   R elbow ROM         D/C    Rhythmic stabilization AFB ER/IR 30\"x3       TS - ER/IR 30\"x3  TS ER/IR 30\"x3   Re-eval  TS           Neuro Re-Ed             Scap squeezes    20x5\"  20x5\"  2x 10 5\"  2x 10 5\"       Shrug hold and relax     20x5\"  20x 5\"  20x 5\"       Prone ITY 2x10 2x10        2x10  2x10  2x10    Prone Row 2x10 2x10       2x10  2x10  2x10                            " "               Ther Ex             Patient education: pathophysiology, surgical overview, rehab protocol, signs/symptoms of infection, HEP review    TS         AAROM table slides flex/ab    Nv  20x5\" 3 way 20x 5\"  20x 5\" scaption  HEP     AAROM cane OH supine 10x10\"             AROM 3 way HEP nv                         Pulleys  5' 5'  5' 5' 5 min  5 min  5' 5 min  5'   ER TB  YTB 3x10  YTB 3x10       Walk 15x YTB Walk (1 step) 15x YTB  YTB 3x10    IR TB YTB 3x10  YTB 3x10       2x10 YTB 2x 10 VC for neutral.  YTB 3x10    TB Tricep pulldown nv RTB 2x15        2x15 YTB   Bicep curl TB  RTB 2x15           Finger ladder 5\" 10x     15x 10x challenging  10x  10x5\"  5\" 10x                  Ther Activity                                       Gait Training                                       Modalities             CP     10' post  10 min  10 min                           "

## 2024-07-22 ENCOUNTER — OFFICE VISIT (OUTPATIENT)
Dept: PHYSICAL THERAPY | Facility: CLINIC | Age: 29
End: 2024-07-22
Payer: OTHER MISCELLANEOUS

## 2024-07-22 DIAGNOSIS — S43.431D TEAR OF RIGHT GLENOID LABRUM, SUBSEQUENT ENCOUNTER: ICD-10-CM

## 2024-07-22 DIAGNOSIS — Z98.890 S/P ARTHROSCOPY OF RIGHT SHOULDER: Primary | ICD-10-CM

## 2024-07-22 PROCEDURE — 97110 THERAPEUTIC EXERCISES: CPT

## 2024-07-22 PROCEDURE — 97140 MANUAL THERAPY 1/> REGIONS: CPT

## 2024-07-22 PROCEDURE — 97112 NEUROMUSCULAR REEDUCATION: CPT

## 2024-07-22 NOTE — PROGRESS NOTES
"Daily Note    Today's date: 24  Patient name: Dmitri Sharma  : 1995  MRN: [id number]  Referring provider: Chrystal Mast PA-C  Dx:   Encounter Diagnosis     ICD-10-CM    1. S/P arthroscopy of right shoulder  Z98.890       2. Tear of right glenoid labrum, subsequent encounter  S43.431D           Start Time: 1400  Stop Time: 1445  Total time in clinic (min): 45 minutes      Subjective: Dmitri reports she was able to drive her car over the weekend.    Objective: See treatment diary below.    Assessment: Dmitri tolerated treatment well with consistent cuing throughout. TE's were performed with increased reps and increased resistance. New TE's were demonstrated with proper technique, and tolerated well. Following treatment, the patient demonstrated fatigue and would benefit from continued physical therapy.    Plan: Continue per plan of care.  Progress treatment as tolerated.         POC expires Unit limit Auth  expiration date PT/OT + Visit Limit?    N/A PENDING? BOMN                 Visit/Unit Tracking  AUTH Status:  Date 7/22  6/26 6/28 7/1 7/3 7/5 7/8 7/10 7/15 7/17 7/19   PENDING? Used 13  3  4 5 6 7 8 9  10 11 12    Remaining                  Precautions: s/p R arthroscopic labral repair (DOS: 24)    Access Code: [email or web address]  URL: [email or web address]  Date: 2024  Prepared by: Peter Marx    Manuals 7/17 7/19 7/22  7/1 7/3 7/5 7/8 7/10 7/15   R shoulder PROM (no IR) per protocol AFB TS TS  TS SC SC TS  SC TS - progressed   R elbow ROM         D/C    Rhythmic stabilization AFB ER/IR 30\"x3  TS ER/IR 30\"x3     TS - ER/IR 30\"x3  TS ER/IR 30\"x3   Re-eval  TS           Neuro Re-Ed             Scap squeezes     20x5\"  2x 10 5\"  2x 10 5\"       Shrug hold and relax     20x5\"  20x 5\"  20x 5\"       Prone ITY 2x10 2x10   2x10      2x10  2x10  2x10    Prone Row 2x10 2x10  3x10 2#     2x10  2x10  2x10                                           Ther Ex             Patient education: " "pathophysiology, surgical overview, rehab protocol, signs/symptoms of infection, HEP review             AAROM table slides flex/ab     20x5\" 3 way 20x 5\"  20x 5\" scaption  HEP     AAROM cane OH supine 10x10\"             AROM 3 way at mirror HEP nv  3x10 ea                       Pulleys  5' 5' 5'  5' 5 min  5 min  5' 5 min  5'   ER TB  YTB 3x10  YTB 3x10  YTB  3x10       Walk 15x YTB Walk (1 step) 15x YTB  YTB 3x10    IR TB YTB 3x10  YTB 3x10  YTB 3x10      2x10 YTB 2x 10 VC for neutral.  YTB 3x10    TB Tricep pulldown nv RTB 2x15 RTB 2x20       2x15 YTB   Bicep curl TB  RTB 2x15 3# 3x10           Finger ladder 5\" 10x     15x 10x challenging  10x  10x5\"  5\" 10x                  Ther Activity                                       Gait Training                                       Modalities             CP       10 min  10 min                           Peter Marx, PT  7/22/2024,2:44 PM  "

## 2024-07-23 NOTE — PROGRESS NOTES
"Daily Note     Today's date: 2024  Patient name: Dmitri Sharma  : 1995  MRN: [id number]  Referring provider: Chrystal Mast PA-C  Dx:   Encounter Diagnosis     ICD-10-CM    1. S/P arthroscopy of right shoulder  Z98.890       2. Tear of right glenoid labrum, subsequent encounter  S43.431D                      Subjective: Shoulder gets stiffness in the shoulder, especially in the AM.  Has some muscle soreness after PT, but that subsides quickly.      Objective: See treatment diary below      Assessment: Tolerated treatment well. Patient demonstrated fatigue post treatment and would benefit from continued PT.  Had some pain with ER stretching today.  Increased resistance with IR today.  Progressing well.      Plan: Continue per plan of care.  Progress treatment as tolerated.       POC expires Unit limit Auth  expiration date PT/OT + Visit Limit?    N/A PENDING? BOMN                 Visit/Unit Tracking  AUTH Status:  Date 7/22 7/24 6/26 6/28 7/1 7/3 7/5 7/8 7/10 7/15 7/17 7/19   PENDING? Used 13 14 3  4 5 6 7 8 9  10 11 12    Remaining                  Precautions: s/p R arthroscopic labral repair (DOS: 24)    Access Code: [email or web address]  URL: [email or web address]  Date: 2024  Prepared by: Peter Marx    Manuals 7/17 7/19 7/22 7/24 7/1 7/3 7/5 7/8 7/10 7/15   R shoulder PROM (no IR) per protocol AFB TS TS JF TS SC SC TS  SC TS - progressed   R elbow ROM         D/C    Rhythmic stabilization AFB ER/IR 30\"x3  TS ER/IR 30\"x3     TS - ER/IR 30\"x3  TS ER/IR 30\"x3   Re-eval  TS           Neuro Re-Ed             Scap squeezes     20x5\"  2x 10 5\"  2x 10 5\"       Shrug hold and relax     20x5\"  20x 5\"  20x 5\"       Prone ITY 2x10 2x10   2x10      2x10  2x10  2x10    Prone Row 2x10 2x10  3x10 2#     2x10  2x10  2x10                                           Ther Ex             Patient education: pathophysiology, surgical overview, rehab protocol, signs/symptoms of infection, HEP review      " "       AAROM table slides flex/ab     20x5\" 3 way 20x 5\"  20x 5\" scaption  HEP     AAROM cane OH supine 10x10\"             AROM 3 way at mirror HEP nv  3x10 ea 3x10 ea                      Pulleys  5' 5' 5' 5' 5' 5 min  5 min  5' 5 min  5'   ER TB  YTB 3x10  YTB 3x10  YTB  3x10   YTB  3x10      Walk 15x YTB Walk (1 step) 15x YTB  YTB 3x10    IR TB YTB 3x10  YTB 3x10  YTB 3x10  RTB 3x10    2x10 YTB 2x 10 VC for neutral.  YTB 3x10    TB Tricep pulldown nv RTB 2x15 RTB 2x20 RTB 2x20      2x15 YTB   Bicep curl TB  RTB 2x15 3# 3x10  3# 3x10          Finger ladder 5\" 10x     15x 10x challenging  10x  10x5\"  5\" 10x                  Ther Activity                                       Gait Training                                       Modalities             CP       10 min  10 min                             "

## 2024-07-24 ENCOUNTER — OFFICE VISIT (OUTPATIENT)
Dept: PHYSICAL THERAPY | Facility: CLINIC | Age: 29
End: 2024-07-24
Payer: OTHER MISCELLANEOUS

## 2024-07-24 DIAGNOSIS — Z98.890 S/P ARTHROSCOPY OF RIGHT SHOULDER: Primary | ICD-10-CM

## 2024-07-24 DIAGNOSIS — S43.431D TEAR OF RIGHT GLENOID LABRUM, SUBSEQUENT ENCOUNTER: ICD-10-CM

## 2024-07-24 PROCEDURE — 97140 MANUAL THERAPY 1/> REGIONS: CPT | Performed by: PHYSICAL THERAPIST

## 2024-07-24 PROCEDURE — 97110 THERAPEUTIC EXERCISES: CPT | Performed by: PHYSICAL THERAPIST

## 2024-07-24 PROCEDURE — 97112 NEUROMUSCULAR REEDUCATION: CPT | Performed by: PHYSICAL THERAPIST

## 2024-07-26 ENCOUNTER — OFFICE VISIT (OUTPATIENT)
Dept: PHYSICAL THERAPY | Facility: CLINIC | Age: 29
End: 2024-07-26
Payer: OTHER MISCELLANEOUS

## 2024-07-26 DIAGNOSIS — Z98.890 S/P ARTHROSCOPY OF RIGHT SHOULDER: Primary | ICD-10-CM

## 2024-07-26 DIAGNOSIS — S43.431D TEAR OF RIGHT GLENOID LABRUM, SUBSEQUENT ENCOUNTER: ICD-10-CM

## 2024-07-26 PROCEDURE — 97112 NEUROMUSCULAR REEDUCATION: CPT

## 2024-07-26 PROCEDURE — 97110 THERAPEUTIC EXERCISES: CPT

## 2024-07-26 PROCEDURE — 97140 MANUAL THERAPY 1/> REGIONS: CPT

## 2024-07-26 NOTE — PROGRESS NOTES
"Daily Note    Today's date: 24  Patient name: Dmitri Sharma  : 1995  MRN: [id number]  Referring provider: Chrystal Mast PA-C  Dx:   Encounter Diagnosis     ICD-10-CM    1. S/P arthroscopy of right shoulder  Z98.890       2. Tear of right glenoid labrum, subsequent encounter  S43.431D           Start Time: 1400  Stop Time: 1445  Total time in clinic (min): 45 minutes      Subjective: Dmitri reports no c/o today. She notes adherence to HEP and is progressing resistance appropriately.    Objective: See treatment diary below.    Assessment: Dmitri tolerated treatment well with consistent cuing throughout. TE's were performed with increased reps and increased resistance. New TE's were demonstrated with proper technique, and tolerated well. Following treatment, the patient demonstrated fatigue and would benefit from continued physical therapy.    Plan: Continue per plan of care.  Progress treatment as tolerated.         POC expires Unit limit Auth  expiration date PT/OT + Visit Limit?    N/A PENDING? BOMN                 Visit/Unit Tracking  AUTH Status:  Date 7/22 7/24 7/26  7/1 7/3 7/5 7/8 7/10 7/15 7/17 7/19   PENDING? Used 13 14 15  5 6 7 8 9  10 11 12    Remaining                  Precautions: s/p R arthroscopic labral repair (DOS: 24)    Access Code: [email or web address]  URL: [email or web address]  Date: 2024  Prepared by: Peter Marx    Manuals 7/17 7/19 7/22 7/24 7/26  7/5 7/8 7/10 7/15   R shoulder PROM (no IR) per protocol AFB TS TS JF TS  SC TS  SC TS - progressed   R elbow ROM         D/C    Rhythmic stabilization AFB ER/IR 30\"x3  TS ER/IR 30\"x3     TS - ER/IR 30\"x3  TS ER/IR 30\"x3   Re-eval  TS           Neuro Re-Ed             Scap squeezes       2x 10 5\"       Shrug hold and relax       20x 5\"       Prone ITY 2x10 2x10   2x10   2x10    2x10  2x10  2x10    Prone Row 2x10 2x10  3x10 2#   2x10 2#   2x10  2x10  2x10                 MARIELA Chan     GTB 3x10                 " "     Ther Ex             Patient education: pathophysiology, surgical overview, rehab protocol, signs/symptoms of infection, HEP review             AAROM table slides flex/ab       20x 5\" scaption  HEP     AAROM cane OH supine 10x10\"             AROM 3 way at mirror HEP nv  3x10 ea 3x10 ea 2x10                      Pulleys  5' 5' 5' 5' 5'  5 min  5' 5 min  5'   ER TB  YTB 3x10  YTB 3x10  YTB  3x10   YTB  3x10   RTB 3x10    Walk 15x YTB Walk (1 step) 15x YTB  YTB 3x10    IR TB YTB 3x10  YTB 3x10  YTB 3x10  RTB 3x10 RTB 3x10    2x10 YTB 2x 10 VC for neutral.  YTB 3x10    TB Tricep pulldown nv RTB 2x15 RTB 2x20 RTB 2x20 GTB 2x15     2x15 YTB   Bicep curl TB  RTB 2x15 3# 3x10  3# 3x10  4# 3x10         Finger ladder 5\" 10x     5\" 10x  10x  10x5\"  5\" 10x                  Ther Activity                                       Gait Training                                       Modalities             CP        10 min                               Peter Marx, PT  7/26/2024,1:32 PM  "

## 2024-07-31 ENCOUNTER — OFFICE VISIT (OUTPATIENT)
Dept: PHYSICAL THERAPY | Facility: CLINIC | Age: 29
End: 2024-07-31
Payer: OTHER MISCELLANEOUS

## 2024-07-31 DIAGNOSIS — Z98.890 S/P ARTHROSCOPY OF RIGHT SHOULDER: Primary | ICD-10-CM

## 2024-07-31 DIAGNOSIS — S43.431D TEAR OF RIGHT GLENOID LABRUM, SUBSEQUENT ENCOUNTER: ICD-10-CM

## 2024-07-31 PROCEDURE — 97112 NEUROMUSCULAR REEDUCATION: CPT

## 2024-07-31 PROCEDURE — 97110 THERAPEUTIC EXERCISES: CPT

## 2024-07-31 PROCEDURE — 97140 MANUAL THERAPY 1/> REGIONS: CPT

## 2024-07-31 NOTE — PROGRESS NOTES
"Daily Note     Today's date: 2024  Patient name: Dmitri Sharma  : 1995  MRN: [id number]  Referring provider: Chrystal Mast PA-C  Dx:   Encounter Diagnosis     ICD-10-CM    1. S/P arthroscopy of right shoulder  Z98.890       2. Tear of right glenoid labrum, subsequent encounter  S43.431D           Start Time: 928  Stop Time: 1015  Total time in clinic (min): 47 minutes    Subjective: Pt noted no changes upon arrival for treatment session. Follow up with ortho scheduled for 24.      Objective: See treatment diary below      Assessment: Continued to progress within protocol s/p R arthroscopic labral repair (DOS: 24).Tolerated treatment well.  Able to demonstrate proper scapular retraction at this time with all exercises and no changes in pain status. Patient demonstrated fatigue post treatment, exhibited good technique with therapeutic exercises, and would benefit from continued PT. At this time with completion of prone FF unable to achieve full ROM but no pain. Also notes with prone horizontal abd having some \"tug\" discomfort in R shoulder.   May have some increase in DOMS s.p treatment.     Plan: Continue per plan of care.      POC expires Unit limit Auth  expiration date PT/OT + Visit Limit?    N/A 24 BOMN/ auth 24 visits.                  Visit/Unit Tracking  AUTH Status:  Date 7/22 7/24 7/26 7/31  7/3 7/5 7/8 7/10 7/15 7/17 7/19   24 visits  Used 13 14 15 16  6 7 8 9  10 11 12    Remaining  11 10 9 8   18 17 16 15 14 13 12     Precautions: s/p R arthroscopic labral repair (DOS: 24)    Access Code: [email or web address]  URL: [email or web address]  Date: 2024  Prepared by: Peter Marx    Manuals 7/17 7/19 7/22 7/24 7/26 7/31  7/8 7/10 7/15   R shoulder PROM (no IR) per protocol AFB TS TS JF TS SC  TS  SC TS - progressed   R elbow ROM         D/C    Rhythmic stabilization AFB ER/IR 30\"x3  TS ER/IR 30\"x3     TS - ER/IR 30\"x3  TS ER/IR 30\"x3   Re-eval  TS         " "  Neuro Re-Ed             Scap squeezes             Shrug hold and relax             Prone ITY 2x10 2x10   2x10   2x10    2x10  2x10  2x10    Prone Row 2x10 2x10  3x10 2#   2x10 2# 4# 2x10  2x10  2x10  2x10                 Webslide M, L     GTB 3x10  GTB 2x 15                     Ther Ex             Patient education: pathophysiology, surgical overview, rehab protocol, signs/symptoms of infection, HEP review             AAROM table slides flex/ab         HEP     AAROM cane OH supine 10x10\"             AROM 3 way at mirror HEP nv  3x10 ea 3x10 ea 2x10  NV                    Pulleys  5' 5' 5' 5' 5' 5 min   5' 5 min  5'   ER TB  YTB 3x10  YTB 3x10  YTB  3x10   YTB  3x10   RTB 3x10  RTB 3x 10   Walk 15x YTB Walk (1 step) 15x YTB  YTB 3x10    IR TB YTB 3x10  YTB 3x10  YTB 3x10  RTB 3x10 RTB 3x10  RTB 3x 10   2x10 YTB 2x 10 VC for neutral.  YTB 3x10    TB Tricep pulldown nv RTB 2x15 RTB 2x20 RTB 2x20 GTB 2x15 GTB 2x 15     2x15 YTB   Bicep curl TB  RTB 2x15 3# 3x10  3# 3x10  4# 3x10  4#   2x 15        Finger ladder 5\" 10x     5\" 10x 5\" 10x  10x5\"  5\" 10x                  Ther Activity                                       Gait Training                                       Modalities             CP                                        "

## 2024-08-02 ENCOUNTER — OFFICE VISIT (OUTPATIENT)
Dept: PHYSICAL THERAPY | Facility: CLINIC | Age: 29
End: 2024-08-02
Payer: OTHER MISCELLANEOUS

## 2024-08-02 DIAGNOSIS — Z98.890 S/P ARTHROSCOPY OF RIGHT SHOULDER: Primary | ICD-10-CM

## 2024-08-02 PROCEDURE — 97110 THERAPEUTIC EXERCISES: CPT

## 2024-08-02 PROCEDURE — 97140 MANUAL THERAPY 1/> REGIONS: CPT

## 2024-08-02 NOTE — PROGRESS NOTES
"Daily Note     Today's date: 2024  Patient name: Dmitri Sharma  : 1995  MRN: [id number]  Referring provider: Chrystal Mast PA-C  Dx:   Encounter Diagnosis     ICD-10-CM    1. S/P arthroscopy of right shoulder  Z98.890           Start Time: 1045  Stop Time: 1130  Total time in clinic (min): 45 minutes    Subjective: Pt noted no changes in pain status since last visit.       Objective: See treatment diary below      Assessment:  continued with treatment session s/p R arthroscopic labral repair (DOS: 24) . No progressions added this visit. Tolerated treatment well. Patient exhibited good technique with therapeutic exercises and would benefit from continued PT may have some muscle soreness s/p treatment session.       Plan: Continue per plan of care.      POC expires Unit limit Auth  expiration date PT/OT + Visit Limit?    N/A 24 BOMN/ auth 24 visits.                  Visit/Unit Tracking  AUTH Status:  Date 7/22 7/24 7/26 7/31 8/2  7/5 7/8 7/10 7/15 7/17 7/19   24 visits  Used 13 14 15 16 17  7 8 9  10 11 12    Remaining  11 10 9 8  7  17 16 15 14 13 12     Precautions: s/p R arthroscopic labral repair (DOS: 24)    Access Code: [email or web address]  URL: [email or web address]  Date: 2024  Prepared by: Peter Marx    Manuals 7/17 7/19 7/22 7/24 7/26 7/31 8/2   7/15   R shoulder PROM (no IR) per protocol AFB TS TS JF TS SC SC   TS - progressed   R elbow ROM             Rhythmic stabilization AFB ER/IR 30\"x3  TS ER/IR 30\"x3       TS ER/IR 30\"x3   Re-eval  TS           Neuro Re-Ed             Scap squeezes             Shrug hold and relax             Prone ITY 2x10 2x10   2x10   2x10      2x10    Prone Row 2x10 2x10  3x10 2#   2x10 2# 4# 2x10 NV reusme    2x10                 Webslide M, L     GTB 3x10  GTB 2x 15  Gtb 30x                    Ther Ex             Patient education: pathophysiology, surgical overview, rehab protocol, signs/symptoms of infection, HEP review           " "  AAROM table slides flex/ab             AAROM cane OH supine 10x10\"             AROM 3 way at mirror HEP nv  3x10 ea 3x10 ea 2x10  NV 2x 10 ea.                    Pulleys  5' 5' 5' 5' 5' 5 min  5 min    5'   ER TB  YTB 3x10  YTB 3x10  YTB  3x10   YTB  3x10   RTB 3x10  RTB 3x 10  RTB2x 15    YTB 3x10    IR TB YTB 3x10  YTB 3x10  YTB 3x10  RTB 3x10 RTB 3x10  RTB 3x 10  RTB 2x 15  Trial  GTB   YTB 3x10    TB Tricep pulldown nv RTB 2x15 RTB 2x20 RTB 2x20 GTB 2x15 GTB 2x 15  GTB 30x    2x15 YTB   Bicep curl TB  RTB 2x15 3# 3x10  3# 3x10  4# 3x10  4#   2x 15        Finger ladder 5\" 10x     5\" 10x 5\" 10x 5\" 2x 10  with slow lowering. Hand gliding on ladder.                    Ther Activity                                       Gait Training                                       Modalities             CP                                           1 on 1 time for 18 minutes on 8/2/24   "

## 2024-08-05 ENCOUNTER — OFFICE VISIT (OUTPATIENT)
Dept: PHYSICAL THERAPY | Facility: CLINIC | Age: 29
End: 2024-08-05
Payer: OTHER MISCELLANEOUS

## 2024-08-05 DIAGNOSIS — Z98.890 S/P ARTHROSCOPY OF RIGHT SHOULDER: Primary | ICD-10-CM

## 2024-08-05 DIAGNOSIS — S43.431D TEAR OF RIGHT GLENOID LABRUM, SUBSEQUENT ENCOUNTER: ICD-10-CM

## 2024-08-05 PROCEDURE — 97110 THERAPEUTIC EXERCISES: CPT

## 2024-08-05 PROCEDURE — 97112 NEUROMUSCULAR REEDUCATION: CPT

## 2024-08-05 PROCEDURE — 97140 MANUAL THERAPY 1/> REGIONS: CPT

## 2024-08-05 NOTE — PROGRESS NOTES
"Daily Note    Today's date: 24  Patient name: Dmitri Sharma  : 1995  MRN: [id number]  Referring provider: Chrystal Mast PA-C  Dx:   Encounter Diagnosis     ICD-10-CM    1. S/P arthroscopy of right shoulder  Z98.890       2. Tear of right glenoid labrum, subsequent encounter  S43.431D           Start Time: 938  Stop Time: 1020  Total time in clinic (min): 42 minutes      Subjective: Dmitri reports small improvement day by day    Objective: See treatment diary below.    Assessment: Dmitri tolerated treatment well with consistent cuing throughout. TE's were performed with increased reps and increased resistance. No new TE's were performed today. Following treatment, the patient demonstrated fatigue and would benefit from continued physical therapy.    Plan: Continue per plan of care.  Progress treatment as tolerated.         POC expires Unit limit Auth  expiration date PT/OT + Visit Limit?    N/A 24 BOMN/ auth 24 visits.                  Visit/Unit Tracking  AUTH Status:  Date 7/22 7/24 7/26 7/31 8/2 8/5  7/8 7/10 7/15 7/17 7/19   24 visits  Used 13 14 15 16 17 18  8 9  10 11 12    Remaining  11 10 9 8  7 6  16 15 14 13 12     Precautions: s/p R arthroscopic labral repair (DOS: 24)    Access Code: [email or web address]  URL: [email or web address]  Date: 2024  Prepared by: Peter Marx    Manuals  8/2 8/5  7/15   R shoulder PROM (no IR) per protocol AFB TS TS JF TS SC SC TS  TS - progressed   R shoulder mobilizations per protocol        Nv inf / post     R elbow ROM             Rhythmic stabilization AFB ER/IR 30\"x3  TS ER/IR 30\"x3       TS ER/IR 30\"x3   Re-eval  TS           Neuro Re-Ed             Scap squeezes             Shrug hold and relax             Prone ITY 2x10 2x10   2x10   2x10    2x10   2x10    Prone Row 2x10 2x10  3x10 2#   2x10 2# 4# 2x10 NV reusme    2x10                 Webslide M, L     GTB 3x10  GTB 2x 15  Gtb 30x  BTB 3x10        " "           Ther Ex             Patient education: pathophysiology, surgical overview, rehab protocol, signs/symptoms of infection, HEP review             AAROM table slides flex/ab             AAROM cane OH supine 10x10\"             AROM 3 way at mirror HEP nv  3x10 ea 3x10 ea 2x10  NV 2x 10 ea.                    Pulleys  5' 5' 5' 5' 5' 5 min  5 min  5'  5'   ER TB  YTB 3x10  YTB 3x10  YTB  3x10   YTB  3x10   RTB 3x10  RTB 3x 10  RTB2x 15  RTB 2x10   YTB 3x10    IR TB YTB 3x10  YTB 3x10  YTB 3x10  RTB 3x10 RTB 3x10  RTB 3x 10  RTB 2x 15  GTB 3x10   YTB 3x10    TB Tricep pulldown nv RTB 2x15 RTB 2x20 RTB 2x20 GTB 2x15 GTB 2x 15  GTB 30x  BTB 3x10   2x15 YTB   Bicep curl TB  RTB 2x15 3# 3x10  3# 3x10  4# 3x10  4#   2x 15   3x10 5#     Finger ladder 5\" 10x     5\" 10x 5\" 10x 5\" 2x 10  with slow lowering. Hand gliding on ladder.                    Ther Activity                                       Gait Training                                       Modalities             CP                                            Peter Marx, PT  8/5/2024,10:40 AM  "

## 2024-08-07 ENCOUNTER — OFFICE VISIT (OUTPATIENT)
Dept: PHYSICAL THERAPY | Facility: CLINIC | Age: 29
End: 2024-08-07
Payer: OTHER MISCELLANEOUS

## 2024-08-07 DIAGNOSIS — Z98.890 S/P ARTHROSCOPY OF RIGHT SHOULDER: Primary | ICD-10-CM

## 2024-08-07 DIAGNOSIS — S43.431D TEAR OF RIGHT GLENOID LABRUM, SUBSEQUENT ENCOUNTER: ICD-10-CM

## 2024-08-07 PROCEDURE — 97112 NEUROMUSCULAR REEDUCATION: CPT

## 2024-08-07 PROCEDURE — 97140 MANUAL THERAPY 1/> REGIONS: CPT

## 2024-08-07 PROCEDURE — 97110 THERAPEUTIC EXERCISES: CPT

## 2024-08-07 NOTE — PROGRESS NOTES
"Daily Note     Today's date: 2024  Patient name: Dmitri Sharma  : 1995  MRN: [id number]  Referring provider: Chrystal Mast PA-C  Dx:   Encounter Diagnosis     ICD-10-CM    1. S/P arthroscopy of right shoulder  Z98.890       2. Tear of right glenoid labrum, subsequent encounter  S43.431D           Start Time: 1025  Stop Time: 1110  Total time in clinic (min): 45 minutes    Subjective: Pt noted that she feels like her R shoulder is more sore today secondary to adding new stretch LV.  (Explained as seated ER stretch)      Objective: See treatment diary below      Assessment:  Continued with treatment, R arthroscopic labral repair (DOS: 24). Progressed with increase resistance with Tbs today as able within protocol. At this time noted feeling better with PROM. Tolerated treatment well. Patient exhibited good technique with therapeutic exercises and would benefit from continued PT  DOMS may be noted sp treatment   Plan: Continue per plan of care.      POC expires Unit limit Auth  expiration date PT/OT + Visit Limit?    N/A 24 BOMN/ auth 24 visits.                  Visit/Unit Tracking  AUTH Status:  Date 7/22 7/24 7/26 7/31 8/2 8/5 8/7  7/10 7/15 7/17 7/19   24 visits  Used 13 14 15 16 17 18 19  9  10 11 12    Remaining  11 10 9 8  7 6 5  15 14 13 12     Precautions: s/p R arthroscopic labral repair (DOS: 24)    Access Code: [email or web address]  URL: [email or web address]  Date: 2024  Prepared by: Peter Marx    Manuals     R shoulder PROM (no IR) per protocol AFB TS TS JF TS SC SC TS SC    R shoulder mobilizations per protocol        Nv inf / post NV with PT     R elbow ROM             Rhythmic stabilization AFB ER/IR 30\"x3  TS ER/IR 30\"x3          Re-eval  TS           Neuro Re-Ed             Scap squeezes             Shrug hold and relax             Prone ITY 2x10 2x10   2x10   2x10    2x10  2x10     Prone Row 2x10 2x10  3x10 2#   " "2x10 2# 4# 2x10 NV reusme                    Leahlide M, L     GTB 3x10  GTB 2x 15  Gtb 30x  BTB 3x10  Btb 3x 10                  Ther Ex             Patient education: pathophysiology, surgical overview, rehab protocol, signs/symptoms of infection, HEP review             AAROM table slides flex/ab             AAROM cane OH supine 10x10\"             AROM 3 way at mirror HEP nv  3x10 ea 3x10 ea 2x10  NV 2x 10 ea.   NV                 Pulleys  5' 5' 5' 5' 5' 5 min  5 min  5' 5min     ER TB  YTB 3x10  YTB 3x10  YTB  3x10   YTB  3x10   RTB 3x10  RTB 3x 10  RTB2x 15  RTB 2x10  GTB     IR TB YTB 3x10  YTB 3x10  YTB 3x10  RTB 3x10 RTB 3x10  RTB 3x 10  RTB 2x 15  GTB 3x10  BTB (self progression) 3x 10     TB Tricep pulldown nv RTB 2x15 RTB 2x20 RTB 2x20 GTB 2x15 GTB 2x 15  GTB 30x  BTB 3x10  BTB 3x 10     Bicep curl TB  RTB 2x15 3# 3x10  3# 3x10  4# 3x10  4#   2x 15   3x10 5# 3x 10 5#\\    Finger ladder 5\" 10x     5\" 10x 5\" 10x 5\" 2x 10  with slow lowering. Hand gliding on ladder.   5\" 2 x 10                  Ther Activity                                       Gait Training                                       Modalities             CP                                               1on 1 time for 25 minutes on 8/7/24.  "

## 2024-08-09 ENCOUNTER — OFFICE VISIT (OUTPATIENT)
Dept: PHYSICAL THERAPY | Facility: CLINIC | Age: 29
End: 2024-08-09
Payer: OTHER MISCELLANEOUS

## 2024-08-09 DIAGNOSIS — S43.431D TEAR OF RIGHT GLENOID LABRUM, SUBSEQUENT ENCOUNTER: ICD-10-CM

## 2024-08-09 DIAGNOSIS — Z98.890 S/P ARTHROSCOPY OF RIGHT SHOULDER: Primary | ICD-10-CM

## 2024-08-09 PROCEDURE — 97110 THERAPEUTIC EXERCISES: CPT

## 2024-08-09 PROCEDURE — 97112 NEUROMUSCULAR REEDUCATION: CPT

## 2024-08-09 PROCEDURE — 97140 MANUAL THERAPY 1/> REGIONS: CPT

## 2024-08-09 NOTE — PROGRESS NOTES
"Daily Note    Today's date: 24  Patient name: Dmitri Sharma  : 1995  MRN: [id number]  Referring provider: Chrystal Mast PA-C  Dx:   Encounter Diagnosis     ICD-10-CM    1. S/P arthroscopy of right shoulder  Z98.890       2. Tear of right glenoid labrum, subsequent encounter  S43.431D           Start Time: 1400  Stop Time: 1445  Total time in clinic (min): 45 minutes      Subjective: Dmitri reports no c/o today. She notes adherence to HEP.    Objective: See treatment diary below.    Assessment: Dmitri tolerated treatment well with consistent cuing throughout. TE's were performed with increased reps and increased resistance. New TE's were demonstrated with proper technique, and tolerated well. Following treatment, the patient demonstrated fatigue and would benefit from continued physical therapy.    Plan: Continue per plan of care.  Progress treatment as tolerated.         POC expires Unit limit Auth  expiration date PT/OT + Visit Limit?    N/A 24 BOMN/ auth 24 visits.                  Visit/Unit Tracking  AUTH Status:  Date  8   24 visits  Used 13 14 15 16 17 18 19 20   11 12    Remaining  11 10 9 8  7 6 5 4   13 12     Precautions: s/p R arthroscopic labral repair (DOS: 24)    Access Code: [email or web address]  URL: [email or web address]  Date: 2024  Prepared by: Peter Marx    Manuals  8 8 8 8   R shoulder PROM (no IR) per protocol AFB TS TS JF TS SC SC TS SC TS   R shoulder mobilizations per protocol        Nv inf / post NV with PT  TS   R elbow ROM             Rhythmic stabilization AFB ER/IR 30\"x3  TS ER/IR 30\"x3          Re-eval  TS           Neuro Re-Ed             Scap squeezes             Shrug hold and relax             Prone ITY 2x10 2x10   2x10   2x10    2x10  2x10     Prone Row 2x10 2x10  3x10 2#   2x10 2# 4# 2x10 NV reusme                    Carlin COLON L     GTB 3x10  GTB 2x 15  " "Gtb 30x  BTB 3x10  Btb 3x 10  BTB 3x10                 Ther Ex             Patient education: pathophysiology, surgical overview, rehab protocol, signs/symptoms of infection, HEP review             UBE for posture and cardio          2'/2'  trial   AAROM table slides flex/ab             AAROM cane OH supine 10x10\"             AROM 3 way at mirror HEP nv  3x10 ea 3x10 ea 2x10  NV 2x 10 ea.   NV 3x10 ea                Pulleys  5' 5' 5' 5' 5' 5 min  5 min  5' 5min     ER TB  YTB 3x10  YTB 3x10  YTB  3x10   YTB  3x10   RTB 3x10  RTB 3x 10  RTB2x 15  RTB 2x10  GTB  GTB 3x10    IR TB YTB 3x10  YTB 3x10  YTB 3x10  RTB 3x10 RTB 3x10  RTB 3x 10  RTB 2x 15  GTB 3x10  BTB (self progression) 3x 10  BTB 3x10    TB Tricep pulldown nv RTB 2x15 RTB 2x20 RTB 2x20 GTB 2x15 GTB 2x 15  GTB 30x  BTB 3x10  BTB 3x 10     Bicep curl TB  RTB 2x15 3# 3x10  3# 3x10  4# 3x10  4#   2x 15   3x10 5# 3x 10 5#\\    Finger ladder 5\" 10x     5\" 10x 5\" 10x 5\" 2x 10  with slow lowering. Hand gliding on ladder.   5\" 2 x 10                  Ther Activity                                       Gait Training                                       Modalities             EVAN Marx, PT  8/9/2024,1:32 PM  "

## 2024-08-12 ENCOUNTER — OFFICE VISIT (OUTPATIENT)
Dept: PHYSICAL THERAPY | Facility: CLINIC | Age: 29
End: 2024-08-12
Payer: OTHER MISCELLANEOUS

## 2024-08-12 DIAGNOSIS — Z98.890 S/P ARTHROSCOPY OF RIGHT SHOULDER: Primary | ICD-10-CM

## 2024-08-12 DIAGNOSIS — S43.431D TEAR OF RIGHT GLENOID LABRUM, SUBSEQUENT ENCOUNTER: ICD-10-CM

## 2024-08-12 PROCEDURE — 97140 MANUAL THERAPY 1/> REGIONS: CPT

## 2024-08-12 PROCEDURE — 97112 NEUROMUSCULAR REEDUCATION: CPT

## 2024-08-12 PROCEDURE — 97110 THERAPEUTIC EXERCISES: CPT

## 2024-08-12 NOTE — PROGRESS NOTES
"Daily Note    Today's date: 24  Patient name: Dmitri Sharma  : 1995  MRN: [id number]  Referring provider: Chrystal Mast PA-C  Dx:   Encounter Diagnosis     ICD-10-CM    1. S/P arthroscopy of right shoulder  Z98.890       2. Tear of right glenoid labrum, subsequent encounter  S43.431D           Start Time: 1200  Stop Time: 1245  Total time in clinic (min): 45 minutes      Subjective: Dmitri reports no c/o today.     Objective: See treatment diary below.    Assessment: Dmitri tolerated treatment well with consistent cuing throughout. TE's were performed with increased reps and increased resistance. New TE's were demonstrated with proper technique, and tolerated well. Following treatment, the patient demonstrated fatigue and would benefit from continued physical therapy.    Plan: Continue per plan of care.  Progress treatment as tolerated.         POC expires Unit limit Auth  expiration date PT/OT + Visit Limit?    N/A 24 BOMN/ auth 24 visits.                  Visit/Unit Tracking  AUTH Status:  Date 7/22 7/24 7/26 7/31 8/2 8/5 8/7 8/9 8/12  7/17 7/19   24 visits  Used 13 14 15 16 17 18 19 20 21  11 12    Remaining  11 10 9 8  7 6 5 4 3  13 12     Precautions: s/p R arthroscopic labral repair (DOS: 24)    Access Code: [email or web address]  URL: [email or web address]  Date: 2024  Prepared by: Peter Marx    Manuals    R shoulder PROM (no IR) per protocol TS  TS JF TS SC SC TS SC TS   R shoulder mobilizations per protocol TS       Nv inf / post NV with PT  TS   R elbow ROM             Rhythmic stabilization   TS ER/IR 30\"x3                       Re-eval             Neuro Re-Ed             Scap squeezes             Shrug hold and relax             Prone ITY   2x10   2x10    2x10  2x10     Prone Row   3x10 2#   2x10 2# 4# 2x10 NV reusme                    Webslide M, L HEP     GTB 3x10  GTB 2x 15  Gtb 30x  BTB 3x10  Btb 3x 10  BTB 3x10  " "  Wall slide AAROM foam roller 20x5\"             Ther Ex             Patient education: pathophysiology, surgical overview, rehab protocol, signs/symptoms of infection, HEP review             UBE for posture and cardio 3'/3'          2'/2'  trial   AAROM table slides flex/ab             AAROM cane OH supine             AROM 3 way at mirror 2x10 ea VC  3x10 ea 3x10 ea 2x10  NV 2x 10 ea.   NV 3x10 ea                Pulleys    5' 5' 5' 5 min  5 min  5' 5min     ER TB    YTB  3x10   YTB  3x10   RTB 3x10  RTB 3x 10  RTB2x 15  RTB 2x10  GTB  GTB 3x10    IR TB   YTB 3x10  RTB 3x10 RTB 3x10  RTB 3x 10  RTB 2x 15  GTB 3x10  BTB (self progression) 3x 10  BTB 3x10    TB Tricep pulldown   RTB 2x20 RTB 2x20 GTB 2x15 GTB 2x 15  GTB 30x  BTB 3x10  BTB 3x 10     Bicep curl TB   3# 3x10  3# 3x10  4# 3x10  4#   2x 15   3x10 5# 3x 10 5#\\    Finger ladder     5\" 10x 5\" 10x 5\" 2x 10  with slow lowering. Hand gliding on ladder.   5\" 2 x 10                  Ther Activity                                       Gait Training                                       Modalities             CP                                                  Peter Marx, PT  8/12/2024,12:39 PM  "

## 2024-08-14 ENCOUNTER — OFFICE VISIT (OUTPATIENT)
Dept: PHYSICAL THERAPY | Facility: CLINIC | Age: 29
End: 2024-08-14
Payer: OTHER MISCELLANEOUS

## 2024-08-14 DIAGNOSIS — S43.431D TEAR OF RIGHT GLENOID LABRUM, SUBSEQUENT ENCOUNTER: ICD-10-CM

## 2024-08-14 DIAGNOSIS — Z98.890 S/P ARTHROSCOPY OF RIGHT SHOULDER: Primary | ICD-10-CM

## 2024-08-14 PROCEDURE — 97140 MANUAL THERAPY 1/> REGIONS: CPT

## 2024-08-14 PROCEDURE — 97110 THERAPEUTIC EXERCISES: CPT

## 2024-08-14 PROCEDURE — 97112 NEUROMUSCULAR REEDUCATION: CPT

## 2024-08-14 NOTE — PROGRESS NOTES
Daily Note     Today's date: 2024  Patient name: Dmitri Sharma  : 1995  MRN: [id number]  Referring provider: Chrystal Mast PA-C  Dx:   Encounter Diagnosis     ICD-10-CM    1. S/P arthroscopy of right shoulder  Z98.890       2. Tear of right glenoid labrum, subsequent encounter  S43.431D           Start Time: 1048  Stop Time: 1129  Total time in clinic (min): 41 minutes    Subjective: pt noted no changes since her last visit. Noted that she did go for a bike ride the other day and had no pain in her shoulder.     Follow up with ortho on 24.   Objective: See treatment diary below      Assessment:  Continued with treatment session R arthroscopic labral repair (DOS: 24). Overall was able to completed all exercises with no increase in pain noted. May note some increase inDOMS s/p session.  Tolerated treatment well. Patient exhibited good technique with therapeutic exercises and would benefit from continued PT.       Plan: Continue per plan of care.      POC expires Unit limit Auth  expiration date PT/OT + Visit Limit?    N/A 24 BOMN/ auth 24 visits.                  Visit/Unit Tracking  AUTH Status:  Date      24 visits  Used 13 14 15 16 17 18 19 20 21 22      Remaining  11 10 9 8  7 6 5 4 3 2       Precautions: s/p R arthroscopic labral repair (DOS: 24)    Access Code: [email or web address]  URL: [email or web address]  Date: 2024  Prepared by: Peter Marx    Manuals    R shoulder PROM (no IR) per protocol TS SC  JF TS SC SC TS SC TS   R shoulder mobilizations per protocol TS NV with PT       Nv inf / post NV with PT  TS   R elbow ROM             Rhythmic stabilization                          Re-eval             Neuro Re-Ed             Scap squeezes             Shrug hold and relax             Prone ITY  3x 10    2x10    2x10  2x10     Prone Row      2x10 2# 4# 2x10 NV reusme              "       Carlin M, MARIELA HEP     GTB 3x10  GTB 2x 15  Gtb 30x  BTB 3x10  Btb 3x 10  BTB 3x10    Wall slide AAROM foam roller 20x5\"             Ther Ex             Patient education: pathophysiology, surgical overview, rehab protocol, signs/symptoms of infection, HEP review             UBE for posture and cardio 3'/3'  3'/3'        2'/2'  trial   AAROM table slides flex/ab             AAROM cane OH supine             AROM 3 way at mirror 2x10 ea VC 3x 10 ea.   3x10 ea 2x10  NV 2x 10 ea.   NV 3x10 ea                Pulleys     5' 5' 5 min  5 min  5' 5min     ER TB     YTB  3x10   RTB 3x10  RTB 3x 10  RTB2x 15  RTB 2x10  GTB  GTB 3x10    IR TB    RTB 3x10 RTB 3x10  RTB 3x 10  RTB 2x 15  GTB 3x10  BTB (self progression) 3x 10  BTB 3x10    TB Tricep pulldown    RTB 2x20 GTB 2x15 GTB 2x 15  GTB 30x  BTB 3x10  BTB 3x 10     Bicep curl TB    3# 3x10  4# 3x10  4#   2x 15   3x10 5# 3x 10 5#\\    Finger ladder     5\" 10x 5\" 10x 5\" 2x 10  with slow lowering. Hand gliding on ladder.   5\" 2 x 10                  Ther Activity                                       Gait Training                                       Modalities             CP                                                    "

## 2024-08-16 ENCOUNTER — OFFICE VISIT (OUTPATIENT)
Dept: PHYSICAL THERAPY | Facility: CLINIC | Age: 29
End: 2024-08-16
Payer: OTHER MISCELLANEOUS

## 2024-08-16 DIAGNOSIS — S43.431D TEAR OF RIGHT GLENOID LABRUM, SUBSEQUENT ENCOUNTER: ICD-10-CM

## 2024-08-16 DIAGNOSIS — Z98.890 S/P ARTHROSCOPY OF RIGHT SHOULDER: Primary | ICD-10-CM

## 2024-08-16 PROCEDURE — 97112 NEUROMUSCULAR REEDUCATION: CPT

## 2024-08-16 PROCEDURE — 97110 THERAPEUTIC EXERCISES: CPT

## 2024-08-16 NOTE — PROGRESS NOTES
Daily Note    Today's date: 24  Patient name: Dmitri Sharma  : 1995  MRN: [id number]  Referring provider: Chrystal Mast PA-C  Dx:   Encounter Diagnosis     ICD-10-CM    1. S/P arthroscopy of right shoulder  Z98.890       2. Tear of right glenoid labrum, subsequent encounter  S43.431D           Start Time: 1259  Stop Time: 1345  Total time in clinic (min): 46 minutes      Subjective: Dmitri reports no c/o today.     Objective: See treatment diary below.    Assessment: Dmitri tolerated treatment well with consistent cuing throughout. TE's were performed with increased reps and increased resistance. No new TE's were performed today. Following treatment, the patient demonstrated fatigue and would benefit from continued physical therapy.    Plan: Continue per plan of care.  Progress treatment as tolerated.         POC expires Unit limit Auth  expiration date PT/OT + Visit Limit?    N/A 24 BOMN/ auth 24 visits.                  Visit/Unit Tracking  AUTH Status:  Date     24 visits  Used 13 14 15 16 17 18 19 20 21 22 23     Remaining  11 10 9 8  7 6 5 4 3 2 1      Precautions: s/p R arthroscopic labral repair (DOS: 24)    Access Code: [email or web address]  URL: [email or web address]  Date: 2024  Prepared by: Peter Marx    Manuals    R shoulder PROM (no IR) per protocol TS SC TS  TS SC SC TS SC TS   R shoulder mobilizations per protocol TS NV with PT       Nv inf / post NV with PT  TS   R elbow ROM             Rhythmic stabilization                          Re-eval             Neuro Re-Ed             Scap squeezes             Shrug hold and relax             Prone ITY  3x 10  3x10 1#  2x10    2x10  2x10     Prone Row   3x10 3#   2x10 2# 4# 2x10 NV reusme       Serratus punch   3x10 3#          Webslide M, L HEP   BTB 3x10   GTB 3x10  GTB 2x 15  Gtb 30x  BTB 3x10  Btb 3x 10  BTB 3x10    Wall  "slide AAROM foam roller 20x5\"   HEP           Ther Ex             Patient education: pathophysiology, surgical overview, rehab protocol, signs/symptoms of infection, HEP review             UBE for posture and cardio 3'/3'  3'/3' 4'/4'        2'/2'  trial   Prone UE ranger abd.   2'          AAROM table slides flex/ab             AAROM cane OH supine             AROM 3 way at mirror 2x10 ea VC 3x 10 ea.  3x10   2x10  NV 2x 10 ea.   NV 3x10 ea                Pulleys      5' 5 min  5 min  5' 5min     ER TB    GTB 3x10   RTB 3x10  RTB 3x 10  RTB2x 15  RTB 2x10  GTB  GTB 3x10    IR TB   BTB 3x10   RTB 3x10  RTB 3x 10  RTB 2x 15  GTB 3x10  BTB (self progression) 3x 10  BTB 3x10    TB Tricep pulldown     GTB 2x15 GTB 2x 15  GTB 30x  BTB 3x10  BTB 3x 10     Bicep curl TB     4# 3x10  4#   2x 15   3x10 5# 3x 10 5#\\    Finger ladder     5\" 10x 5\" 10x 5\" 2x 10  with slow lowering. Hand gliding on ladder.   5\" 2 x 10                  Ther Activity                                       Gait Training                                       Modalities             CP                                                      Peter Marx, PT  8/16/2024,2:23 PM  "

## 2024-08-19 ENCOUNTER — EVALUATION (OUTPATIENT)
Dept: PHYSICAL THERAPY | Facility: CLINIC | Age: 29
End: 2024-08-19
Payer: OTHER MISCELLANEOUS

## 2024-08-19 DIAGNOSIS — S43.431D TEAR OF RIGHT GLENOID LABRUM, SUBSEQUENT ENCOUNTER: ICD-10-CM

## 2024-08-19 DIAGNOSIS — Z98.890 S/P ARTHROSCOPY OF RIGHT SHOULDER: Primary | ICD-10-CM

## 2024-08-19 PROCEDURE — 97110 THERAPEUTIC EXERCISES: CPT

## 2024-08-19 PROCEDURE — 97140 MANUAL THERAPY 1/> REGIONS: CPT

## 2024-08-19 PROCEDURE — 97112 NEUROMUSCULAR REEDUCATION: CPT

## 2024-08-19 NOTE — PROGRESS NOTES
PT Evaluation     Today's date: 2024  Patient name: Dmitri Sharma  : 1995  MRN: [id number]  Referring provider: Chrystal Mast PA-C  Dx:   Encounter Diagnosis     ICD-10-CM    1. S/P arthroscopy of right shoulder  Z98.890       2. Tear of right glenoid labrum, subsequent encounter  S43.431D           Start Time: 1220  Stop Time: 1300  Total time in clinic (min): 40 minutes    Assessment  Impairments: abnormal muscle firing, abnormal or restricted ROM, activity intolerance, impaired physical strength, lacks appropriate home exercise program, pain with function and safety issue    Assessment details: Dmitri Sharma is a pleasant 29 y.o. female who presents today for a re-evaluation of her R shoulder. Dmitri complains of aching pain in the right shoulder ranging from 0/10 to 3/10. Pain is exacerbated by activity or driving and made better by medication or rest.    The patient demonstrates moderately decreased strength during resisted muscle testing, which has improved from previous evaluation. Pt ROM is moderately decreased with pain at end ranges.    The patient has difficulty with transfers, dressing, hygiene, leisure, athletics, work, and driving. Patient will benefit from continued skilled physical therapy, including therapeutic exercise, stretching, manual therapy, and modalities prn to improve their level of function, to increase overall quality of life, and to address her impairments.    Dmitri has met some of her goals at this time. Pt was instructed on her updated plan of care and wishes to continue therapy.    Understanding of Dx/Px/POC: excellent     Prognosis: excellent    Goals  Short Term Goals: to be achieved by 4 weeks   1) Patient to be independent with basic HEP. - GOAL MET   2) Decrease pain to 5/10 at its worst. - GOAL MET   3) Increase shoulder flexion PROM to 160 deg. - GOAL MET   4) Increase shoulder abduction PROM to 160 deg. - GOAL PROGRESSING   5) Increase shoulder ER PROM at  "30 deg abduction to 60 deg.- GOAL MET   6) Increase shoulder IR PROM at 30 deg abduction to 30 deg. - GOAL MET   7) Patient to report decreased sleep interruption secondary to pain. - GOAL MET     Long Term Goals: to be achieved by discharge - GOALS  PROGRESSING   1) FOTO equal to or greater than 63.   2) Patient to be independent with comprehensive HEP.  3) Abolish pain for improved quality of life.  4) Increase shoulder ROM to within 5 deg of contralateral UE to improve a/iadls.   5) Increase shoulder strength to 5/5 MMT grade in all planes to improve a/iadls.   6) Patient to report no sleep interruption secondary to pain.  7) Patient to return to full duty at work.     Plan  Patient would benefit from: skilled PT  Planned modality interventions: cryotherapy, hydrotherapy, TENS, thermotherapy: hydrocollator packs and low level laser therapy    Planned therapy interventions: activity modification, ADL retraining, ADL training, behavior modification, body mechanics training, dressing changes, functional ROM exercises, home exercise program, IADL retraining, joint mobilization, manual therapy, massage, neuromuscular re-education, patient education, postural training, self care, strengthening, stretching, therapeutic activities and therapeutic exercise    Frequency: 2-3x week.  Duration in weeks: 12  Plan of Care beginning date: 8/19/2024  Plan of Care expiration date: 11/15/2024  Treatment plan discussed with: patient        Subjective Evaluation    History of Present Illness  Mechanism of injury: Dmitri reports that she is feeling better since starting therapy two months ago.    she notes some continued difficulty with her usual tasks including reaching overhead, driving, and lifting her arm high.     she notes no pain at this time, but does have pain with certain motions.     she notes adherence to HEP and would like to continue with therapy.    Patient Goals  Patient goal: \"come out like it never " "happened\"  Pain  Current pain ratin  At best pain ratin  At worst pain rating: 3  Quality: dull ache    Social Support    Employment status: working ([employer]  - currently out of work)  Treatments  Previous treatment: physical therapy        Objective     Observations     Right Shoulder  Right shoulder incision: clean, well-approximated, no signs of infection ; steri-strips intact.    Active Range of Motion   Left Shoulder   Normal active range of motion    Right Shoulder   Flexion: 140 degrees   Abduction: 90 degrees   External rotation BTH: C3   Internal rotation BTB: sacrum     Right Wrist   Wrist flexion: WFL  Wrist extension: WFl    Passive Range of Motion   Left Shoulder   Normal passive range of motion    Right Shoulder   Flexion: 155 degrees   Abduction: 160 degrees   External rotation 90°: 65 degrees     Right Elbow   Flexion: WFL  Extension: 5 degrees     Strength/Myotome Testing     Left Shoulder   Normal muscle strength    Right Shoulder     Planes of Motion   Flexion: 4   Abduction: 4-   External rotation at 0°: 4   Internal rotation at 0°: 4     Isolated Muscles   Biceps: 4+   Triceps: 4                POC expires Unit limit Auth  expiration date PT/OT + Visit Limit?    N/A 24 BOMN/ auth 24 visits.                  Visit/Unit Tracking  AUTH Status:  Date 7/22 7/24 7/26 7/31 8/2 8/5 8/7 8/9 8/12 8/14 8/16 8/19   24 visits  Used 13 14 15 16 17 18 19 20 21 22 23 24    Remaining  11 10 9 8  7 6 5 4 3 2 1 0     Precautions: s/p R arthroscopic labral repair (DOS: 24)    Access Code: [email or web address]  URL: [email or web address]  Date: 2024  Prepared by: Peter Marx    Manuals    R shoulder PROM (no IR) per protocol TS SC TS TS  SC SC TS SC TS   R shoulder mobilizations per protocol TS NV with PT   TS    Nv inf / post NV with PT  TS   R elbow ROM             Rhythmic stabilization                          Re-eval    TS       " "  Neuro Re-Ed             Scap squeezes             Shrug hold and relax             Prone ITY  3x 10  3x10 1#     2x10  2x10     Prone Row   3x10 3#   4# 2x10 NV reusme       Serratus punch   3x10 3#          Webslide M, L HEP   BTB 3x10  Black 3x10   GTB 2x 15  Gtb 30x  BTB 3x10  Btb 3x 10  BTB 3x10    Wall slide AAROM foam roller 20x5\"   HEP           Ther Ex             Patient education: pathophysiology, surgical overview, rehab protocol, signs/symptoms of infection, HEP review             UBE for posture and cardio 3'/3'  3'/3' 4'/4'  4'/4'       2'/2'  trial   Prone UE ranger abd.   2'          AAROM table slides flex/ab             AAROM cane OH supine             AROM 3 way at mirror 2x10 ea VC 3x 10 ea.  3x10    NV 2x 10 ea.   NV 3x10 ea                Pulleys       5 min  5 min  5' 5min     ER TB    GTB 3x10  BTB 3x10   RTB 3x 10  RTB2x 15  RTB 2x10  GTB  GTB 3x10    IR TB   BTB 3x10  BTB 3x10   RTB 3x 10  RTB 2x 15  GTB 3x10  BTB (self progression) 3x 10  BTB 3x10    TB Tricep pulldown      GTB 2x 15  GTB 30x  BTB 3x10  BTB 3x 10     Bicep curl TB      4#   2x 15   3x10 5# 3x 10 5#\\    Finger ladder      5\" 10x 5\" 2x 10  with slow lowering. Hand gliding on ladder.   5\" 2 x 10                  Ther Activity                                       Gait Training                                       Modalities             CP                                "

## 2024-08-21 ENCOUNTER — OFFICE VISIT (OUTPATIENT)
Dept: PHYSICAL THERAPY | Facility: CLINIC | Age: 29
End: 2024-08-21
Payer: OTHER MISCELLANEOUS

## 2024-08-21 DIAGNOSIS — S43.431D TEAR OF RIGHT GLENOID LABRUM, SUBSEQUENT ENCOUNTER: ICD-10-CM

## 2024-08-21 DIAGNOSIS — Z98.890 S/P ARTHROSCOPY OF RIGHT SHOULDER: Primary | ICD-10-CM

## 2024-08-21 PROCEDURE — 97112 NEUROMUSCULAR REEDUCATION: CPT

## 2024-08-21 PROCEDURE — 97110 THERAPEUTIC EXERCISES: CPT

## 2024-08-21 NOTE — PROGRESS NOTES
Daily Note     Today's date: 2024  Patient name: Dmitri Sharma  : 1995  MRN: [id number]  Referring provider: Chrystal Mast PA-C  Dx:   Encounter Diagnosis     ICD-10-CM    1. S/P arthroscopy of right shoulder  Z98.890       2. Tear of right glenoid labrum, subsequent encounter  S43.431D           Start Time: 1047  Stop Time: 1133  Total time in clinic (min): 46 minutes    Subjective: Pt reports some soreness following progressions made LV, but quickly resolved within a few minutes of completing her treatment.        Objective: See treatment diary below      Assessment: Tolerated treatment well. While patient has made gains and improvements since beginning physical therapy, she still exhibits moderate limitations in both ROM and strength of R shldr.  Is challenged with current program, experiencing slight soreness by end of treatment.  Has difficulty maintaining lowered R shldr during 3-way AROM at mirror, especially as she fatigues.  Patient would benefit from continued PT to further improve upon these deficits in strength and mobility.        Plan: Continue per plan of care.      POC expires Unit limit Auth  expiration date PT/OT + Visit Limit?    N/A 24 BOMN/ auth 24 visits.                  Visit/Unit Tracking  AUTH Status:  Date    Pending Used 25   16 17 18 19 20 21 22 23 24    Remaining  1   8  7 6 5 4 3 2 1 0     Precautions: s/p R arthroscopic labral repair (DOS: 24)    Access Code: [email or web address]  URL: [email or web address]  Date: 2024  Prepared by: Peter Marx    Manuals    R shoulder PROM (no IR) per protocol TS SC TS TS AFB  SC TS SC TS   R shoulder mobilizations per protocol TS NV with PT   TS    Nv inf / post NV with PT  TS   R elbow ROM             Rhythmic stabilization                          Re-eval    TS         Neuro Re-Ed             Scap squeezes            "  Shrug hold and relax             Prone ITY  3x 10  3x10 1#  3x10 1#   2x10  2x10     Prone Row   3x10 3#  3x10 3#  NV reusme       Serratus punch   3x10 3#  3x10 3#        Webslide M, L HEP   BTB 3x10  Black 3x10  Black 3x10   Gtb 30x  BTB 3x10  Btb 3x 10  BTB 3x10    Wall slide AAROM foam roller 20x5\"   HEP           Ther Ex             Patient education: pathophysiology, surgical overview, rehab protocol, signs/symptoms of infection, HEP review             UBE for posture and cardio 3'/3'  3'/3' 4'/4'  4'/4'  4'/4'     2'/2'  trial   Prone UE ranger abd.   2'          AAROM table slides flex/ab             AAROM cane OH supine             AROM 3 way at mirror 2x10 ea VC 3x 10 ea.  3x10   2x10 ea  2x 10 ea.   NV 3x10 ea                Pulleys        5 min  5' 5min     ER TB    GTB 3x10  BTB 3x10  BTB 3x10  RTB2x 15  RTB 2x10  GTB  GTB 3x10    IR TB   BTB 3x10  BTB 3x10  BTB 3x10  RTB 2x 15  GTB 3x10  BTB (self progression) 3x 10  BTB 3x10    TB Tricep pulldown       GTB 30x  BTB 3x10  BTB 3x 10     Bicep curl TB        3x10 5# 3x 10 5#\\    Finger ladder       5\" 2x 10  with slow lowering. Hand gliding on ladder.   5\" 2 x 10                  Ther Activity                                       Gait Training                                       Modalities             CP                                "

## 2024-08-23 ENCOUNTER — OFFICE VISIT (OUTPATIENT)
Dept: PHYSICAL THERAPY | Facility: CLINIC | Age: 29
End: 2024-08-23
Payer: OTHER MISCELLANEOUS

## 2024-08-23 DIAGNOSIS — Z98.890 S/P ARTHROSCOPY OF RIGHT SHOULDER: Primary | ICD-10-CM

## 2024-08-23 DIAGNOSIS — S43.431D TEAR OF RIGHT GLENOID LABRUM, SUBSEQUENT ENCOUNTER: ICD-10-CM

## 2024-08-23 PROCEDURE — 97140 MANUAL THERAPY 1/> REGIONS: CPT

## 2024-08-23 PROCEDURE — 97112 NEUROMUSCULAR REEDUCATION: CPT

## 2024-08-23 PROCEDURE — 97110 THERAPEUTIC EXERCISES: CPT

## 2024-08-23 NOTE — PROGRESS NOTES
Daily Note    Today's date: 24  Patient name: Dmitri Sharma  : 1995  MRN: [id number]  Referring provider: Chrystal Mast PA-C  Dx:   Encounter Diagnosis     ICD-10-CM    1. S/P arthroscopy of right shoulder  Z98.890       2. Tear of right glenoid labrum, subsequent encounter  S43.431D           Start Time: 1145  Stop Time: 1230  Total time in clinic (min): 45 minutes      Subjective: Dmitri reports no changes    Objective: See treatment diary below.    Assessment: Dmitri tolerated treatment well with consistent cuing throughout. TE's were performed with increased reps. No new TE's were performed today. Following treatment, the patient demonstrated fatigue and would benefit from continued physical therapy.    Plan: Continue per plan of care.  Progress treatment as tolerated.         POC expires Unit limit Auth  expiration date PT/OT + Visit Limit?    N/A 24 BOMN/ auth 24 visits.                  Visit/Unit Tracking  AUTH Status:  Date    Pending Used 25 26  16 17 18 19 20 21 22 23 24    Remaining  1 15  8  7 6 5 4 3 2 1 0     Precautions: s/p R arthroscopic labral repair (DOS: 24)    Access Code: [email or web address]  URL: [email or web address]  Date: 2024  Prepared by: Peter Marx    Manuals    R shoulder PROM per protocol TS SC TS TS AFB TS  TS SC TS   R shoulder mobilizations per protocol TS NV with PT   TS  TS  Nv inf / post NV with PT  TS   R elbow ROM             Rhythmic stabilization                          Re-eval    TS         Neuro Re-Ed             Scap squeezes             Shrug hold and relax             Prone ITY  3x 10  3x10 1#  3x10 1# 3x10  1#  2x10  2x10     Prone Row   3x10 3#  3x10 3# 3x10        Serratus punch   3x10 3#  3x10 3# 3x10        Webslide M, L HEP   BTB 3x10  Black 3x10  Black 3x10  Black 3x10   BTB 3x10  Btb 3x 10  BTB 3x10    Wall slide AAROM foam  "roller 20x5\"   HEP           Ther Ex             Patient education: pathophysiology, surgical overview, rehab protocol, signs/symptoms of infection, HEP review             UBE for posture and cardio 3'/3'  3'/3' 4'/4'  4'/4'  4'/4' 4'/4'     2'/2'  trial   Prone UE ranger abd.   2'          AAROM table slides flex/ab             AAROM cane OH supine             AROM 3 way at mirror 2x10 ea VC 3x 10 ea.  3x10   2x10 ea    NV 3x10 ea                Pulleys         5' 5min     ER TB    GTB 3x10  BTB 3x10  BTB 3x10 BTB 3x10   RTB 2x10  GTB  GTB 3x10    IR TB   BTB 3x10  BTB 3x10  BTB 3x10 BTB 10x walks  GTB 3x10  BTB (self progression) 3x 10  BTB 3x10    TB Tricep pulldown      Black 3x10   BTB 3x10  BTB 3x 10     Bicep curl TB      3x10 6#  3x10 5# 3x 10 5#\\                 Ther Activity                                       Gait Training                                       Modalities             CP                                  Peter Marx, PT  8/23/2024,11:57 AM  "

## 2024-08-26 ENCOUNTER — OFFICE VISIT (OUTPATIENT)
Dept: OBGYN CLINIC | Facility: CLINIC | Age: 29
End: 2024-08-26

## 2024-08-26 ENCOUNTER — OFFICE VISIT (OUTPATIENT)
Dept: PHYSICAL THERAPY | Facility: CLINIC | Age: 29
End: 2024-08-26
Payer: OTHER MISCELLANEOUS

## 2024-08-26 VITALS
HEIGHT: 64 IN | HEART RATE: 102 BPM | SYSTOLIC BLOOD PRESSURE: 134 MMHG | DIASTOLIC BLOOD PRESSURE: 93 MMHG | WEIGHT: 113 LBS | BODY MASS INDEX: 19.29 KG/M2

## 2024-08-26 DIAGNOSIS — Z98.890 S/P ARTHROSCOPY OF RIGHT SHOULDER: Primary | ICD-10-CM

## 2024-08-26 DIAGNOSIS — S43.431D TEAR OF RIGHT GLENOID LABRUM, SUBSEQUENT ENCOUNTER: ICD-10-CM

## 2024-08-26 DIAGNOSIS — S43.431D LABRAL TEAR OF SHOULDER, RIGHT, SUBSEQUENT ENCOUNTER: ICD-10-CM

## 2024-08-26 PROCEDURE — 97110 THERAPEUTIC EXERCISES: CPT

## 2024-08-26 PROCEDURE — 97140 MANUAL THERAPY 1/> REGIONS: CPT

## 2024-08-26 PROCEDURE — 99024 POSTOP FOLLOW-UP VISIT: CPT | Performed by: STUDENT IN AN ORGANIZED HEALTH CARE EDUCATION/TRAINING PROGRAM

## 2024-08-26 PROCEDURE — 97112 NEUROMUSCULAR REEDUCATION: CPT

## 2024-08-26 NOTE — LETTER
August 26, 2024     Patient: Dmitri Sharma  YOB: 1995  Date of Visit: 8/26/2024      To Whom it May Concern:    Dmitri Sharma is under my professional care. Dmitri was seen in my office on 8/26/2024. Dmitri is out of work until further notice. Will re-evaluate in 6 weeks.    If you have any questions or concerns, please don't hesitate to call.         Sincerely,          Zhen Wagner MD        CC: No Recipients

## 2024-08-26 NOTE — PROGRESS NOTES
Daily Note    Today's date: 24  Patient name: Dmitri Sharma  : 1995  MRN: [id number]  Referring provider: Chrystal Mast PA-C  Dx:   Encounter Diagnosis     ICD-10-CM    1. S/P arthroscopy of right shoulder  Z98.890       2. Tear of right glenoid labrum, subsequent encounter  S43.431D           Start Time: 1400  Stop Time: 1445  Total time in clinic (min): 45 minutes      Subjective: Dmirti reports no changes today, she still has some difficulty with overhead.    Objective: See treatment diary below.    Assessment: Dmitri tolerated treatment well with consistent cuing throughout. TE's were performed with increased reps and increased resistance. No new TE's were performed today. Following treatment, the patient demonstrated fatigue and would benefit from continued physical therapy.    Plan: Continue per plan of care.  Progress treatment as tolerated.         POC expires Unit limit Auth  expiration date PT/OT + Visit Limit?    N/A 24 BOMN/ auth 24 visits.                  Visit/Unit Tracking  AUTH Status:  Date 8/21 8/23 8/26  8/2 8/5 8/7 8/9 8/12 8/14 8/16 8/19   Pending Used 25 26 37  17 18 19 20 21 22 23 24    Remaining  1 15 14  7 6 5 4 3 2 1 0     Precautions: s/p R arthroscopic labral repair (DOS: 24)    Access Code: [email or web address]  URL: [email or web address]  Date: 2024  Prepared by: Peter Marx    Manuals    R shoulder PROM per protocol TS SC TS TS AFB TS   SC TS   R shoulder mobilizations per protocol TS NV with PT   TS  TS   NV with PT  TS   R elbow ROM             Rhythmic stabilization                          Re-eval    TS         Neuro Re-Ed             Scap squeezes             Shrug hold and relax             Prone ITY  3x 10  3x10 1#  3x10 1# 3x10  1#   2x10     Prone Row   3x10 3#  3x10 3# 3x10        Serratus punch   3x10 3#  3x10 3# 3x10        Webslide M, L HEP   BTB 3x10  Black 3x10  Black 3x10  Black 3x10   "Violet   3x10 14#, 10#  Btb 3x 10  BTB 3x10    Wall slide AAROM foam roller 20x5\"   HEP           Ther Ex             Patient education: pathophysiology, surgical overview, rehab protocol, signs/symptoms of infection, HEP review             UBE for posture and cardio 3'/3'  3'/3' 4'/4'  4'/4'  4'/4' 4'/4'  4'/4'    2'/2'  trial   Prone UE ranger abd.   2'          AAROM table slides flex/ab             AAROM cane OH supine             AROM 3 way at mirror 2x10 ea VC 3x 10 ea.  3x10   2x10 ea    NV 3x10 ea                Pulleys          5min     ER TB    GTB 3x10  BTB 3x10  BTB 3x10 BTB 3x10  Violet 3# 3x10   GTB  GTB 3x10    IR TB   BTB 3x10  BTB 3x10  BTB 3x10 BTB 10x walks [place]  2.2# 2x10 - walks NV  BTB (self progression) 3x 10  BTB 3x10    TB Tricep pulldown      Black 3x10  Black 3x10   BTB 3x 10     Bicep curl TB      3x10 6#   3x 10 5#\\                 Ther Activity                                       Gait Training                                       Modalities             CP                                    Peter Marx, PT  8/26/2024,6:06 PM  "

## 2024-08-26 NOTE — PROGRESS NOTES
Assessment/Plan:  1. S/P arthroscopy of right shoulder  Ambulatory Referral to Physical Therapy      2. Labral tear of shoulder, right, subsequent encounter  Ambulatory Referral to Physical Therapy        Scribe Attestation      I,:  Sebastian Ruiz am acting as a scribe while in the presence of the attending physician.:       I,:  Zhen Wagner MD personally performed the services described in this documentation    as scribed in my presence.:           Dmitri upon examination and review of her physical therapy notes is doing as expected now 3 months status post arthroscopic repair of the posterior labrum.  I did explain that stiffness at this point in her recovery is not uncommon and should continue to improve as she progresses with physical therapy.  I did encourage her to continue physical therapy with a focus on improving her range of motion and strength of her shoulder with anticipation of returning back to work.  I do not believe more invasive treatments are warranted at this time.  She verbalized understanding had no further questions.  A new work note was provided to her and indicated she is out of work until further notice.  I will see her back in 6 weeks for repeat clinical evaluation.      Subjective:   Dmitri Sharma is a 29 y.o. female who presents for follow-up evaluation of her right shoulder.  She is 3 months status post arthroscopic repair of the posterior labrum.  Date of surgery 5/28/2024.  This is a Workmen's Compensation case.  She does work as a [employer]  She states that overall she is doing well but is experiencing stiffness in the shoulder particularly with abduction.  She denies any feelings of instability in the shoulder.  She states that her average pain is 4 out of 10, she is not take any pain meds at this point.  She is able to work around this restriction with physical therapy.  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  She denies any mechanical symptoms such as locking or instability of her shoulder.  She also denies any distal paresthesias.  She has been out of work up to this point.      Review of Systems   Constitutional:  Negative for chills, fever and unexpected weight change.   HENT:  Negative for hearing loss, nosebleeds and sore throat.    Eyes:  Negative for pain, redness and visual disturbance.   Respiratory:  Negative for cough, shortness of breath and wheezing.    Cardiovascular:  Negative for chest pain, palpitations and leg swelling.   Gastrointestinal:  Negative for abdominal pain, nausea and vomiting.   Endocrine: Negative for polydipsia and polyuria.   Genitourinary:  Negative for dysuria and hematuria.   Musculoskeletal:  Positive for arthralgias and myalgias.        See HPI   Skin:  Negative for rash and wound.   Neurological:  Negative for dizziness, numbness and headaches.   Psychiatric/Behavioral:  Negative for decreased concentration and suicidal ideas. The patient is not nervous/anxious.          Past Medical History:   Diagnosis Date    Allergic        Past Surgical History:   Procedure Laterality Date    FL INJECTION RIGHT SHOULDER (ARTHROGRAM)  4/22/2024    MA SURGICAL ARTHROSCOPY SHOULDER CAPSULORRHAPHY Right 5/28/2024    Procedure: SHOULDER ARTHROSCOPIC REPAIR LABRUM;  Surgeon: Zhen Wagner MD;  Location: [facility];  Service: Orthopedics       Family History   Problem Relation Age of Onset    Hypertension Mother     Alcohol abuse Father     Diabetes Sister     Diabetes Paternal Grandmother     Breast cancer Paternal Grandmother     Diabetes Paternal Grandfather        Social History     Occupational History    Not on file   Tobacco Use    Smoking status: Never     Passive exposure: Never    Smokeless tobacco: Never   Vaping Use    Vaping status: Never Used   Substance and Sexual Activity    Alcohol use: Not Currently     Comment: rare    Drug use: Never    Sexual activity: Not  Currently     Partners: Male     Birth control/protection: Abstinence, Condom Male         Current Outpatient Medications:     fexofenadine (ALLEGRA) 60 MG tablet, Take 60 mg by mouth every morning, Disp: , Rfl:     meloxicam (Mobic) 15 mg tablet, Take 1 tablet (15 mg total) by mouth daily for 28 days (Patient not taking: Reported on 7/11/2024), Disp: 28 tablet, Rfl: 0    ondansetron (ZOFRAN) 4 mg tablet, Take 1 tablet (4 mg total) by mouth every 8 (eight) hours as needed for nausea or vomiting (Patient taking differently: Take 4 mg by mouth every 8 (eight) hours as needed for nausea or vomiting AS NEEDED), Disp: 10 tablet, Rfl: 0    oxyCODONE (Roxicodone) 5 immediate release tablet, Take 1 tablet (5 mg total) by mouth every 4 (four) hours as needed for severe pain Max Daily Amount: 30 mg (Patient not taking: Reported on 7/11/2024), Disp: 15 tablet, Rfl: 0    Allergies   Allergen Reactions    Miconazole Swelling and Rash     Localized Swelling       Objective:  Vitals:    08/26/24 1147   BP: 134/93   Pulse: 102       Right Shoulder Exam     Tenderness   The patient is experiencing no tenderness.    Range of Motion   Active abduction:  150   External rotation:  60   Forward flexion:  150     Muscle Strength   Abduction: 4/5   Internal rotation: 5/5   External rotation: 5/5     Other   Erythema: absent  Scars: present (well healed portal scars)  Sensation: normal  Pulse: present            Physical Exam  Vitals and nursing note reviewed.   Constitutional:       Appearance: She is well-developed.   HENT:      Head: Normocephalic and atraumatic.      Right Ear: External ear normal.      Left Ear: External ear normal.      Nose: Nose normal.   Eyes:      General:         Right eye: No discharge.         Left eye: No discharge.      Conjunctiva/sclera: Conjunctivae normal.   Cardiovascular:      Rate and Rhythm: Normal rate.   Pulmonary:      Effort: Pulmonary effort is normal. No respiratory distress.   Musculoskeletal:       Cervical back: Normal range of motion and neck supple.      Comments: As noted in ortho exam   Skin:     General: Skin is warm and dry.   Neurological:      Mental Status: She is alert and oriented to person, place, and time.   Psychiatric:         Behavior: Behavior normal.         Thought Content: Thought content normal.         Judgment: Judgment normal.         I have personally reviewed pertinent films in PACS and my interpretation is as follows:  No new imaging reviewed today      This document was created using speech voice recognition software.   Grammatical errors, random word insertions, pronoun errors, and incomplete sentences are an occasional consequence of this system due to software limitations, ambient noise, and hardware issues.   Any formal questions or concerns about content, text, or information contained within the body of this dictation should be directly addressed to the provider for clarification.

## 2024-08-28 ENCOUNTER — OFFICE VISIT (OUTPATIENT)
Dept: PHYSICAL THERAPY | Facility: CLINIC | Age: 29
End: 2024-08-28
Payer: OTHER MISCELLANEOUS

## 2024-08-28 DIAGNOSIS — S43.431D TEAR OF RIGHT GLENOID LABRUM, SUBSEQUENT ENCOUNTER: ICD-10-CM

## 2024-08-28 DIAGNOSIS — Z98.890 S/P ARTHROSCOPY OF RIGHT SHOULDER: Primary | ICD-10-CM

## 2024-08-28 PROCEDURE — 97112 NEUROMUSCULAR REEDUCATION: CPT

## 2024-08-28 PROCEDURE — 97110 THERAPEUTIC EXERCISES: CPT

## 2024-08-28 NOTE — PROGRESS NOTES
Daily Note     Today's date: 2024  Patient name: Dmitri Sharma  : 1995  MRN: [id number]  Referring provider: +Chrystal Yun PA-C  Dx:   Encounter Diagnosis     ICD-10-CM    1. S/P arthroscopy of right shoulder  Z98.890       2. Tear of right glenoid labrum, subsequent encounter  S43.431D           Start Time: 1041  Stop Time: 1120  Total time in clinic (min): 39 minutes    Subjective: pt noted no changes since last treatment session.       Objective: See treatment diary below      Assessment: Continued with treatment session, R arthroscopic labral repair (DOS: 24). Tolerated treatment well. Patient exhibited good technique with therapeutic exercises and would benefit from continued PT no changes s/p treatment session. Was able to complete all exercises with proper form.    Due to unable to get full range with elzbieta transitioned back to TB's.     Plan: Continue per plan of care.      POC expires Unit limit Auth  expiration date PT/OT + Visit Limit?    N/A 24 BOMN/ auth 24 visits.                  Visit/Unit Tracking  AUTH Status:  Date    Pending Used 25 26 37 36  18 19 20 21 22 23 24    Remaining  1 15 14 13  6 5 4 3 2 1 0     Precautions: s/p R arthroscopic labral repair (DOS: 24)    Access Code: [email or web address]  URL: [email or web address]  Date: 2024  Prepared by: Peter Marx    Manuals    R shoulder PROM per protocol TS SC TS TS AFB TS    TS   R shoulder mobilizations per protocol TS NV with PT   TS  TS    TS   R elbow ROM             Rhythmic stabilization                          Re-eval    TS         Neuro Re-Ed             Scap squeezes             Shrug hold and relax             Prone ITY  3x 10  3x10 1#  3x10 1# 3x10  1#  3x 10 1#      Prone Row   3x10 3#  3x10 3# 3x10   3# 3x 10      Serratus punch   3x10 3#  3x10 3# 3x10   NP      Webslide M, L HEP   BTB 3x10   "Black 3x10  Black 3x10  Black 3x10  [place]   3x10 14#, 10# Black 3x 10   BTB 3x10    Wall slide AAROM foam roller 20x5\"   HEP           Ther Ex             Patient education: pathophysiology, surgical overview, rehab protocol, signs/symptoms of infection, HEP review             UBE for posture and cardio 3'/3'  3'/3' 4'/4'  4'/4'  4'/4' 4'/4'  4'/4'  4'/4'  2'/2'  trial   Prone UE ranger abd.   2'          AAROM table slides flex/ab             AAROM cane OH supine             AROM 3 way at mirror 2x10 ea VC 3x 10 ea.  3x10   2x10 ea     3x10 ea                Pulleys              ER TB    GTB 3x10  BTB 3x10  BTB 3x10 BTB 3x10  Violet 3# 3x10  BTB 3x 10   GTB 3x10    IR TB   BTB 3x10  BTB 3x10  BTB 3x10 BTB 10x walks [place]  2.2# 2x10 - walks NV BTB 3x10  BTB 3x10    TB Tricep pulldown      Black 3x10  Black 3x10  Black 3x 10      Bicep curl TB      3x10 6#  6# 3x 12                   Ther Activity                                       Gait Training                                       Modalities             CP                                      "

## 2024-08-30 ENCOUNTER — OFFICE VISIT (OUTPATIENT)
Dept: PHYSICAL THERAPY | Facility: CLINIC | Age: 29
End: 2024-08-30
Payer: OTHER MISCELLANEOUS

## 2024-08-30 DIAGNOSIS — Z98.890 S/P ARTHROSCOPY OF RIGHT SHOULDER: Primary | ICD-10-CM

## 2024-08-30 DIAGNOSIS — S43.431D TEAR OF RIGHT GLENOID LABRUM, SUBSEQUENT ENCOUNTER: ICD-10-CM

## 2024-08-30 PROCEDURE — 97110 THERAPEUTIC EXERCISES: CPT

## 2024-08-30 PROCEDURE — 97140 MANUAL THERAPY 1/> REGIONS: CPT

## 2024-08-30 PROCEDURE — 97112 NEUROMUSCULAR REEDUCATION: CPT

## 2024-08-30 NOTE — PROGRESS NOTES
"Daily Note    Today's date: 24  Patient name: Dmitri Sharma  : 1995  MRN: [id number]  Referring provider: +Chrystal Yun PA-C  Dx:   Encounter Diagnosis     ICD-10-CM    1. S/P arthroscopy of right shoulder  Z98.890       2. Tear of right glenoid labrum, subsequent encounter  S43.431D           Start Time: 1300  Stop Time: 1345  Total time in clinic (min): 45 minutes      Subjective: Dmitri reports no changes today. She feels better performing internal rotation with the thera band and does not have as much pain as last time.    Objective: See treatment diary below.    Assessment: Dmitri tolerated treatment well with consistent cuing throughout. TE's were performed with increased reps and increased resistance. New TE's were demonstrated with proper technique, and tolerated well. Following treatment, the patient demonstrated fatigue and would benefit from continued physical therapy.    Plan: Continue per plan of care.  Progress treatment as tolerated.         POC expires Unit limit Auth  expiration date PT/OT + Visit Limit?    N/A 24 BOMN/ auth 24 visits.                  Visit/Unit Tracking  AUTH Status:  Date    Pending Used 25 26 37 36 37  19 20 21 22 23 24    Remaining  1 15 14 13 14  5 4 3 2 1 0     Precautions: s/p R arthroscopic labral repair (DOS: 24)    Access Code: [email or web address]  URL: [email or web address]  Date: 2024  Prepared by: Peter Marx    Manuals     R shoulder PROM per protocol TS SC TS TS AFB TS       R shoulder mobilizations per protocol TS NV with PT   TS  TS   TS    R elbow ROM             Rhythmic stabilization         TS 30\"x3 at 90* elbow flx                 Re-eval    TS         Neuro Re-Ed             Scap squeezes             Shrug hold and relax             Prone ITY  3x 10  3x10 1#  3x10 1# 3x10  1#  3x 10 1#      Prone Row   3x10 3#  3x10 3# " "3x10   3# 3x 10      Serratus punch   3x10 3#  3x10 3# 3x10   NP      Webslide M, L HEP   BTB 3x10  Black 3x10  Black 3x10  Black 3x10  [place]   3x10 14#, 10# Black 3x 10  [place] 3x10 15#/10.2#     Wall slide AAROM foam roller 20x5\"   HEP           Ther Ex             Patient education: pathophysiology, surgical overview, rehab protocol, signs/symptoms of infection, HEP review             UBE for posture and cardio 3'/3'  3'/3' 4'/4'  4'/4'  4'/4' 4'/4'  4'/4'  4'/4' 4'/4' L4    Prone UE ranger abd.   2'          AAROM table slides flex/ab             AAROM cane OH supine             AROM 3 way at mirror 2x10 ea VC 3x 10 ea.  3x10   2x10 ea                     Pulleys              ER TB    GTB 3x10  BTB 3x10  BTB 3x10 BTB 3x10  [place] 3# 3x10  BTB 3x 10  BTB 3x10     IR TB   BTB 3x10  BTB 3x10  BTB 3x10 BTB 10x walks [place]  2.2# 2x10 - walks NV BTB 3x10 BTB 3x10 no pain until 3x8    TB Tricep pulldown      Black 3x10  Black 3x10  Black 3x 10      Bicep curl TB      3x10 6#  6# 3x 12                   Ther Activity                                       Gait Training                                       Modalities             CP                                        Peter Marx, PT  8/30/2024,1:23 PM  "

## 2024-09-03 ENCOUNTER — OFFICE VISIT (OUTPATIENT)
Dept: PHYSICAL THERAPY | Facility: CLINIC | Age: 29
End: 2024-09-03
Payer: OTHER MISCELLANEOUS

## 2024-09-03 DIAGNOSIS — Z98.890 S/P ARTHROSCOPY OF RIGHT SHOULDER: Primary | ICD-10-CM

## 2024-09-03 DIAGNOSIS — S43.431D TEAR OF RIGHT GLENOID LABRUM, SUBSEQUENT ENCOUNTER: ICD-10-CM

## 2024-09-03 PROCEDURE — 97530 THERAPEUTIC ACTIVITIES: CPT

## 2024-09-03 PROCEDURE — 97112 NEUROMUSCULAR REEDUCATION: CPT

## 2024-09-03 PROCEDURE — 97110 THERAPEUTIC EXERCISES: CPT

## 2024-09-03 NOTE — PROGRESS NOTES
"Daily Note    Today's date: 24  Patient name: Dmitri Sharma  : 1995  MRN: [id number]  Referring provider: +Chrystal Yun PA-C  Dx:   Encounter Diagnosis     ICD-10-CM    1. S/P arthroscopy of right shoulder  Z98.890       2. Tear of right glenoid labrum, subsequent encounter  S43.431D           Start Time: 1145  Stop Time: 1230  Total time in clinic (min): 45 minutes      Subjective: Dmitri reports no c/o today. She was doing a lot of moving, lifting, and driving.    Objective: See treatment diary below.    Assessment: Dmitri tolerated treatment well with consistent cuing throughout. TE's were performed with increased reps and increased resistance. New TE's were demonstrated with proper technique, and tolerated well. Following treatment, the patient demonstrated fatigue and would benefit from continued physical therapy.    Plan: Continue per plan of care.        POC expires Unit limit Auth  expiration date PT/OT + Visit Limit?    N/A 24 BOMN/ auth 24 visits.                  Visit/Unit Tracking  AUTH Status:  Date 8/21 8/23 8/26 8/28 8/30 9/3  8/9 8/12 8/14 8/16 8/19   Pending Used 25 26 37 36 37 38  20 21 22 23 24    Remaining  1 15 14 13 12 11  4 3 2 1 0     Precautions: s/p R arthroscopic labral repair (DOS: 24)    Access Code: [email or web address]  URL: [email or web address]  Date: 2024  Prepared by: Peter Marx    Manuals  9/3   R shoulder PROM per protocol TS SC TS TS AFB TS       R shoulder mobilizations per protocol TS NV with PT   TS  TS   TS    R elbow ROM             Rhythmic stabilization         TS 30\"x3 at 90* elbow flx                 Re-eval    TS         Neuro Re-Ed             Scap squeezes             Shrug hold and relax             Prone ITY  3x 10  3x10 1#  3x10 1# 3x10  1#  3x 10 1#      Prone Row   3x10 3#  3x10 3# 3x10   3# 3x 10      Serratus punch   3x10 3#  3x10 3# 3x10   NP      MARIELA Chan HEP   BTB " "3x10  Black 3x10  Black 3x10  Black 3x10  Violet   3x10 14#, 10# Black 3x 10  Violet 3x10 15#/10.2#     Wall med ball 4 ways          X20 ea RMB   Wall walk TB          YTB 6x   Wall slide AAROM foam roller 20x5\"   HEP        10x10\"    Ther Ex             Patient education: pathophysiology, surgical overview, rehab protocol, signs/symptoms of infection, HEP review             UBE for posture and cardio 3'/3'  3'/3' 4'/4'  4'/4'  4'/4' 4'/4'  4'/4'  4'/4' 4'/4' L4 4'/4' L5   Prone UE ranger abd.   2'          AAROM table slides flex/ab             AAROM cane OH supine             AROM 3 way at mirror 2x10 ea VC 3x 10 ea.  3x10   2x10 ea        Adduction TB          BTB 3x10    Lateral Raises          1# 3x10    Pulleys              ER TB    GTB 3x10  BTB 3x10  BTB 3x10 BTB 3x10  [place] 3# 3x10  BTB 3x 10  BTB 3x10     IR TB   BTB 3x10  BTB 3x10  BTB 3x10 BTB 10x walks [place]  2.2# 2x10 - walks NV BTB 3x10 BTB 3x10 no pain until 3x8    TB Tricep pulldown      Black 3x10  Black 3x10  Black 3x 10      Bicep curl TB      3x10 6#  6# 3x 12                   Ther Activity             Total gym pullups prone          L13 2x6 pillow                Gait Training                                       Modalities             EVAN Marx, PT  9/3/2024,12:58 PM  "

## 2024-09-04 ENCOUNTER — OFFICE VISIT (OUTPATIENT)
Dept: PHYSICAL THERAPY | Facility: CLINIC | Age: 29
End: 2024-09-04
Payer: OTHER MISCELLANEOUS

## 2024-09-04 DIAGNOSIS — S43.431D TEAR OF RIGHT GLENOID LABRUM, SUBSEQUENT ENCOUNTER: ICD-10-CM

## 2024-09-04 DIAGNOSIS — Z98.890 S/P ARTHROSCOPY OF RIGHT SHOULDER: Primary | ICD-10-CM

## 2024-09-04 PROCEDURE — 97112 NEUROMUSCULAR REEDUCATION: CPT | Performed by: PHYSICAL THERAPIST

## 2024-09-04 PROCEDURE — 97140 MANUAL THERAPY 1/> REGIONS: CPT | Performed by: PHYSICAL THERAPIST

## 2024-09-04 PROCEDURE — 97110 THERAPEUTIC EXERCISES: CPT | Performed by: PHYSICAL THERAPIST

## 2024-09-04 NOTE — PROGRESS NOTES
"Daily Note     Today's date: 2024  Patient name: Dmitri Sharma  : 1995  MRN: [id number]  Referring provider: +Chrystal Yun PA-C  Dx:   Encounter Diagnosis     ICD-10-CM    1. S/P arthroscopy of right shoulder  Z98.890       2. Tear of right glenoid labrum, subsequent encounter  S43.431D           Start Time: 1045  Stop Time: 1145  Total time in clinic (min): 60 minutes    Subjective: Patient reports that active abduction motion is improving, and she can get to approx 90 degrees.       Objective: See treatment diary below      Assessment: Patient demonstrated good tolerance to prescribed activity, despite last session being < 24 hours prior. Following heavy AP and inf GHJ mobs, patient reported less pain with active abduction but could still only obtain approx 90 degrees. Following heavy inf AC/SCJ mobs patient demonstrated approx 120 degrees active abduction. ROM further improved to approx 145 degrees following cupping to subscap region. Continue to address AC/SCJ mobility and tissue extensibility, as able.       Plan: Continue per plan of care.      POC expires Unit limit Auth  expiration date PT/OT + Visit Limit?    N/A 24 BOMN/ auth 24 visits.                  Visit/Unit Tracking  AUTH Status:  Date 8/21 8/23 8/26 8/28 8/30 9/3 9/4  8/12 8/14 8/16 8/19   Pending Used 25 26 37 36 37 38 39  21 22 23 24    Remaining  1 15 14 13 12 11 10  3 2 1 0     Precautions: s/p R arthroscopic labral repair (DOS: 24)    Access Code: [email or web address]  URL: [email or web address]  Date: 2024  Prepared by: Peter Marx    Manuals 9/4  8/16 8/19 8/21 8/23 8/26 8/28 8/30 9/3   R shoulder PROM per protocol   TS TS AFB TS       R shoulder mobilizations per protocol Gr 4 AP/inf GHJ mobs  Gr 4 inf AC/SCJ mobs   TS  TS   TS    R elbow ROM             Rhythmic stabilization         TS 30\"x3 at 90* elbow flx    Subscap cupping in SL Active flexion 10x5\" passive scaption 10x5\"            Re-eval    TS " "        Neuro Re-Ed             Scap squeezes             Shrug hold and relax             Prone ITY   3x10 1#  3x10 1# 3x10  1#  3x 10 1#      Prone Row   3x10 3#  3x10 3# 3x10   3# 3x 10      Serratus punch   3x10 3#  3x10 3# 3x10   NP      Webslide M, L   BTB 3x10  Black 3x10  Black 3x10  Black 3x10  [place]   3x10 14#, 10# Black 3x 10  Violet 3x10 15#/10.2#     Wall med ball 4 ways          X20 ea RMB   Wall walk TB YTB 10x         YTB 10x   Wall slide AAROM foam roller 10x10\"  HEP        10x10\"                 Ther Ex             Patient education: pathophysiology, surgical overview, rehab protocol, signs/symptoms of infection, HEP review             UBE for posture and cardio 4'/4' L5  4'/4'  4'/4'  4'/4' 4'/4'  4'/4'  4'/4' 4'/4' L4 4'/4' L5   Prone UE ranger abd.   2'          AAROM table slides flex/ab             AAROM cane OH supine             AROM 3 way at mirror   3x10   2x10 ea        Adduction TB BTB 3x10         BTB 3x10    Lateral Raises          1# 3x10    Pulleys              ER TB    GTB 3x10  BTB 3x10  BTB 3x10 BTB 3x10  [place] 3# 3x10  BTB 3x 10  BTB 3x10     IR TB   BTB 3x10  BTB 3x10  BTB 3x10 BTB 10x walks [place]  2.2# 2x10 - walks NV BTB 3x10 BTB 3x10 no pain until 3x8    TB Tricep pulldown      Black 3x10  Black 3x10  Black 3x 10      Bicep curl TB      3x10 6#  6# 3x 12                   Ther Activity             Total gym pullups prone          L13 2x6 pillow                Gait Training                                       Modalities             CP                                            "

## 2024-09-06 ENCOUNTER — OFFICE VISIT (OUTPATIENT)
Dept: PHYSICAL THERAPY | Facility: CLINIC | Age: 29
End: 2024-09-06
Payer: OTHER MISCELLANEOUS

## 2024-09-06 ENCOUNTER — VBI (OUTPATIENT)
Dept: ADMINISTRATIVE | Facility: OTHER | Age: 29
End: 2024-09-06

## 2024-09-06 DIAGNOSIS — S43.431D TEAR OF RIGHT GLENOID LABRUM, SUBSEQUENT ENCOUNTER: ICD-10-CM

## 2024-09-06 DIAGNOSIS — Z98.890 S/P ARTHROSCOPY OF RIGHT SHOULDER: Primary | ICD-10-CM

## 2024-09-06 PROCEDURE — 97110 THERAPEUTIC EXERCISES: CPT

## 2024-09-06 PROCEDURE — 97112 NEUROMUSCULAR REEDUCATION: CPT

## 2024-09-06 PROCEDURE — 97140 MANUAL THERAPY 1/> REGIONS: CPT

## 2024-09-06 NOTE — PROGRESS NOTES
"Daily Note     Today's date: 2024  Patient name: Dmitri Sharma  : 1995  MRN: [id number]  Referring provider: +Chrystal Yun PA-C  Dx:   Encounter Diagnosis     ICD-10-CM    1. S/P arthroscopy of right shoulder  Z98.890       2. Tear of right glenoid labrum, subsequent encounter  S43.431D           Start Time: 1300  Stop Time: 1345  Total time in clinic (min): 45 minutes    Subjective: Patient presents to PT feeling better since previous PT session. Patient mentioned that they have more shoulder motion.      Objective: See treatment diary below      Assessment: Tolerated treatment well. Patient demonstrated fatigue post treatment, exhibited good technique with therapeutic exercises, and would benefit from continued PT. Patient was able to complete all of there exercises without any adverse symptoms. Patient responded well to increased repetitions.       Plan: Continue per plan of care.  Progress treatment as tolerated.       POC expires Unit limit Auth  expiration date PT/OT + Visit Limit?    N/A 24 BOMN/ auth 24 visits.                  Visit/Unit Tracking  AUTH Status:  Date 8/21 8/23 8/26 8/28 8/30 9/3 9/4 9/6 8/12 8/14 8/16 8/19   Pending Used 25 26 37 36 37 38 39 40 21 22 23 24    Remaining  1 15 14 13 12 11 10 9 3 2 1 0     Precautions: s/p R arthroscopic labral repair (DOS: 24)    Access Code: [email or web address]  URL: [email or web address]  Date: 2024  Prepared by: Peter Marx    Manuals  9/3   R shoulder PROM per protocol   TS TS AFB TS       R shoulder mobilizations per protocol Gr 4 AP/inf GHJ mobs  Gr 4 inf AC/SCJ mobs TS grade ll-lll  AC and SC joint inferior   TS  TS   TS    R elbow ROM             R AC joint mob             Rhythmic stabilization         TS 30\"x3 at 90* elbow flx    Subscap cupping in SL Active flexion 10x5\" passive scaption 10x5\"            Re-eval    TS         Neuro Re-Ed             Scap squeezes       " "      Shrug hold and relax             Prone ITY   3x10 1#  3x10 1# 3x10  1#  3x 10 1#      Prone Row   3x10 3#  3x10 3# 3x10   3# 3x 10      Serratus punch   3x10 3#  3x10 3# 3x10   NP      Webslide M, L  [place] 3x10 15#  L 2x10 BTB 3x10  Black 3x10  Black 3x10  Black 3x10  Violet   3x10 14#, 10# Black 3x 10  [place] 3x10 15#/10.2#     Wall med ball 4 ways          X20 ea RMB   Wall walk TB YTB 10x         YTB 10x   Wall slide AAROM foam roller 10x10\" 10x10\" HEP        10x10\"                 Ther Ex             Patient education: pathophysiology, surgical overview, rehab protocol, signs/symptoms of infection, HEP review             UBE for posture and cardio 4'/4' L5 4'/4'  L5 4'/4'  4'/4'  4'/4' 4'/4'  4'/4'  4'/4' 4'/4' L4 4'/4' L5   Prone UE ranger abd.   2'          AAROM table slides flex/ab             Bent over Flexion table foam   3-5\"   10x           AAROM cane OH supine             AROM 3 way at mirror   3x10   2x10 ea        Adduction TB BTB 3x10         BTB 3x10    Lateral Raises          1# 3x10    Pulleys              ER TB    GTB 3x10  BTB 3x10  BTB 3x10 BTB 3x10  [place] 3# 3x10  BTB 3x 10  BTB 3x10     IR TB   BTB 3x10  BTB 3x10  BTB 3x10 BTB 10x walks [place]  2.2# 2x10 - walks [place] BTB 3x10 BTB 3x10 no pain until 3x8    TB Tricep pulldown      Black 3x10  Black 3x10  Black 3x 10      Bicep curl TB      3x10 6#  6# 3x 12                   Ther Activity             Total gym pullups prone          L13 2x6 pillow                Gait Training                                       Modalities             CP                                              "

## 2024-09-09 ENCOUNTER — OFFICE VISIT (OUTPATIENT)
Dept: PHYSICAL THERAPY | Facility: CLINIC | Age: 29
End: 2024-09-09
Payer: OTHER MISCELLANEOUS

## 2024-09-09 DIAGNOSIS — Z98.890 S/P ARTHROSCOPY OF RIGHT SHOULDER: Primary | ICD-10-CM

## 2024-09-09 DIAGNOSIS — S43.431D TEAR OF RIGHT GLENOID LABRUM, SUBSEQUENT ENCOUNTER: ICD-10-CM

## 2024-09-09 PROCEDURE — 97140 MANUAL THERAPY 1/> REGIONS: CPT

## 2024-09-09 PROCEDURE — 97112 NEUROMUSCULAR REEDUCATION: CPT

## 2024-09-09 PROCEDURE — 97110 THERAPEUTIC EXERCISES: CPT

## 2024-09-09 NOTE — PROGRESS NOTES
"Daily Note    Today's date: 24  Patient name: Dmitri Sharma  : 1995  MRN: [id number]  Referring provider: +Chrystal Yun PA-C  Dx:   Encounter Diagnosis     ICD-10-CM    1. S/P arthroscopy of right shoulder  Z98.890       2. Tear of right glenoid labrum, subsequent encounter  S43.431D           Start Time: 1400  Stop Time: 1445  Total time in clinic (min): 45 minutes      Subjective: Dmitri reports point tenderness in her R shoulder and AC joint.    Objective: See treatment diary below.    Assessment: Dmitri tolerated treatment well with consistent cuing throughout. TE's were performed with increased reps and increased resistance. New TE's were demonstrated with proper technique, and tolerated well. Following treatment, the patient demonstrated fatigue and would benefit from continued physical therapy.    Plan: Continue per plan of care.  Progress treatment as tolerated.         POC expires Unit limit Auth  expiration date PT/OT + Visit Limit?    N/A 24 BOMN/ auth 24 visits.                  Visit/Unit Tracking  AUTH Status:  Date 8/21 8/23 8/26 8/28 8/30 9/3 9/4 9/6 9/9  8/16 8/19   Pending Used 25 26 37 36 37 38 39 40 41  23 24    Remaining  1 15 14 13 12 11 10 9 8  1 0     Precautions: s/p R arthroscopic labral repair (DOS: 24)    Access Code: [email or web address]  URL: [email or web address]  Date: 2024  Prepared by: Peter Marx    Manuals 9/4 9/6 9/9  8/21 8/23 8/26 8/28 8/30 9/3   R shoulder PROM per protocol     AFB TS       R shoulder mobilizations per protocol Gr 4 AP/inf GHJ mobs  Gr 4 inf AC/SCJ mobs TS grade ll-lll  AC and SC joint inferior  TS g3-4 post/inf MWM   TS   TS    R elbow ROM             R AC joint mob   TS G2-3 MWM ad/ab/flexion          Rhythmic stabilization         TS 30\"x3 at 90* elbow flx    Subscap cupping in SL Active flexion 10x5\" passive scaption 10x5\"            Re-eval             Neuro Re-Ed             Scap squeezes             Shrug hold " "and relax             Prone ITY     3x10 1# 3x10  1#  3x 10 1#      Prone Row     3x10 3# 3x10   3# 3x 10      Serratus punch     3x10 3# 3x10   NP      Webslide M, L  [place] 3x10 15#  L 2x10   Black 3x10  Black 3x10  [place]   3x10 14#, 10# Black 3x 10  [place] 3x10 15#/10.2#     Wall med ball 4 ways   RMB 20x ea       X20 ea RMB   Wall walk TB YTB 10x  YTB 10x       YTB 10x   Wall slide AAROM foam roller 10x10\" 10x10\"        10x10\"    TB clock   5x YTB          Ther Ex             Patient education: pathophysiology, surgical overview, rehab protocol, signs/symptoms of infection, HEP review             UBE for posture and cardio 4'/4' L5 4'/4'  L5 L5 5'/5'   4'/4' 4'/4'  4'/4'  4'/4' 4'/4' L4 4'/4' L5   Prone UE ranger abd.             AAROM table slides flex/ab             Bent over Flexion table foam   3-5\"   10x           AAROM cane OH supine             AROM 3 way at mirror     2x10 ea        Adduction TB BTB 3x10         BTB 3x10    Lateral Raises          1# 3x10    Pulleys              ER TB      BTB 3x10 BTB 3x10  [place] 3# 3x10  BTB 3x 10  BTB 3x10     IR TB     BTB 3x10 BTB 10x walks [place]  2.2# 2x10 - walks NV BTB 3x10 BTB 3x10 no pain until 3x8    TB Tricep pulldown      Black 3x10  Black 3x10  Black 3x 10      Bicep curl TB      3x10 6#  6# 3x 12                   Ther Activity             Total gym pullups prone          L13 2x6 pillow                Gait Training                                       Modalities             CP                                                Peter Marx, PT  9/9/2024,4:17 PM  "

## 2024-09-12 ENCOUNTER — OFFICE VISIT (OUTPATIENT)
Dept: PHYSICAL THERAPY | Facility: CLINIC | Age: 29
End: 2024-09-12
Payer: OTHER MISCELLANEOUS

## 2024-09-12 DIAGNOSIS — Z98.890 S/P ARTHROSCOPY OF RIGHT SHOULDER: Primary | ICD-10-CM

## 2024-09-12 DIAGNOSIS — S43.431D TEAR OF RIGHT GLENOID LABRUM, SUBSEQUENT ENCOUNTER: ICD-10-CM

## 2024-09-12 PROCEDURE — 97140 MANUAL THERAPY 1/> REGIONS: CPT | Performed by: PHYSICAL THERAPIST

## 2024-09-12 PROCEDURE — 97112 NEUROMUSCULAR REEDUCATION: CPT | Performed by: PHYSICAL THERAPIST

## 2024-09-12 PROCEDURE — 97110 THERAPEUTIC EXERCISES: CPT | Performed by: PHYSICAL THERAPIST

## 2024-09-12 NOTE — PROGRESS NOTES
Daily Note     Today's date: 2024  Patient name: Dmitri Sharma  : 1995  MRN: [id number]  Referring provider: +Chrystal Yun PA-C  Dx:   Encounter Diagnosis     ICD-10-CM    1. S/P arthroscopy of right shoulder  Z98.890       2. Tear of right glenoid labrum, subsequent encounter  S43.431D                      Subjective: Had a little soreness after last session, but felt like the AC jt mobs were helpful.  Still gets some superior shoulder discomfort with abduction.      Objective: See treatment diary below      Assessment: Tolerated treatment well. Patient demonstrated fatigue post treatment and would benefit from continued PT  Gets some muscle burn in shoulder with TE today.  Shoulder AROM ER improved from 40 to 60 degrees with TPR right [place].  ABD improve from 150 to 165 degrees after AC/SC mobs and PROM with scapular upward rotation mob.  Still gets some pinch at end range flexion and ABD      Plan: Continue per plan of care.      POC expires Unit limit Auth  expiration date PT/OT + Visit Limit?    N/A 24 BOMN/ auth 24 visits.                  Visit/Unit Tracking  AUTH Status:  Date 8/21 8/23 8/26 8/28 8/30 9/3 9/4 9/6 9/9 9/12 8/16 8/19   Pending Used 25 26 37 36 37 38 39 40 41 42 23 24    Remaining  1 15 14 13 12 11 10 9 8 7 1 0     Precautions: s/p R arthroscopic labral repair (DOS: 24)    Access Code: [email or web address]  URL: [email or web address]  Date: 2024  Prepared by: Peter Marx    Manuals 9/4 9/6 9/9 9/12 8/21 8/23 8/26 8/28 8/30 9/3   R shoulder PROM per protocol     AFB TS       R shoulder mobilizations per protocol Gr 4 AP/inf GHJ mobs  Gr 4 inf AC/SCJ mobs TS grade ll-lll  AC and SC joint inferior  TS g3-4 post/inf MWM JF Grade III-IV  TS   TS    R elbow ROM             PROM SL ABD with Scapular upward rotation mob    JF          R AC joint mob   TS G2-3 MWM ad/ab/flexion PA mobs AC and SC jt JF Grade III         TPR right pec minor, subscapularis and  "lats    JF         Rhythmic stabilization         TS 30\"x3 at 90* elbow flx    Subscap cupping in SL Active flexion 10x5\" passive scaption 10x5\"            Re-eval             Neuro Re-Ed             Scap squeezes             Shrug hold and relax             Prone ITY     3x10 1# 3x10  1#  3x 10 1#      Prone Row     3x10 3# 3x10   3# 3x 10      Serratus punch     3x10 3# 3x10   NP      Webslide M, L  [place] 3x10 15#  L 2x10   Black 3x10  Black 3x10  Violet   3x10 14#, 10# Black 3x 10  [place] 3x10 15#/10.2#     Wall med ball 4 ways   RMB 20x ea       X20 ea RMB   Wall walk TB YTB 10x  YTB 10x       YTB 10x   Wall slide AAROM foam roller 10x10\" 10x10\"        10x10\"    TB clock   5x YTB          Ther Ex             Patient education: pathophysiology, surgical overview, rehab protocol, signs/symptoms of infection, HEP review             UBE for posture and cardio 4'/4' L5 4'/4'  L5 L5 5'/5'  L5 5'/5'  4'/4' 4'/4'  4'/4'  4'/4' 4'/4' L4 4'/4' L5   Prone UE ranger abd.             AAROM table slides flex/ab             Bent over Flexion table foam   3-5\"   10x           AAROM cane OH supine             AROM 3 way at mirror     2x10 ea        Adduction TB BTB 3x10         BTB 3x10    Lateral Raises          1# 3x10    Pulleys              ER TB      BTB 3x10 BTB 3x10  [place] 3# 3x10  BTB 3x 10  BTB 3x10     IR TB     BTB 3x10 BTB 10x walks [place]  2.2# 2x10 - walks NV BTB 3x10 BTB 3x10 no pain until 3x8    TB Tricep pulldown      Black 3x10  Black 3x10  Black 3x 10      Bicep curl TB      3x10 6#  6# 3x 12                   Ther Activity             Total gym pullups prone          L13 2x6 pillow                Gait Training                                       Modalities             CP                                                  "

## 2024-09-16 ENCOUNTER — OFFICE VISIT (OUTPATIENT)
Dept: PHYSICAL THERAPY | Facility: CLINIC | Age: 29
End: 2024-09-16
Payer: OTHER MISCELLANEOUS

## 2024-09-16 DIAGNOSIS — S43.431D TEAR OF RIGHT GLENOID LABRUM, SUBSEQUENT ENCOUNTER: ICD-10-CM

## 2024-09-16 DIAGNOSIS — Z98.890 S/P ARTHROSCOPY OF RIGHT SHOULDER: Primary | ICD-10-CM

## 2024-09-16 PROCEDURE — 97110 THERAPEUTIC EXERCISES: CPT

## 2024-09-16 PROCEDURE — 97140 MANUAL THERAPY 1/> REGIONS: CPT

## 2024-09-16 PROCEDURE — 97112 NEUROMUSCULAR REEDUCATION: CPT

## 2024-09-16 NOTE — PROGRESS NOTES
Daily Note    Today's date: 24  Patient name: Dmitri Sharma  : 1995  MRN: [id number]  Referring provider: +Chrystal Yun PA-C  Dx:   Encounter Diagnosis     ICD-10-CM    1. S/P arthroscopy of right shoulder  Z98.890       2. Tear of right glenoid labrum, subsequent encounter  S43.431D           Start Time: 1200  Stop Time: 1245  Total time in clinic (min): 45 minutes      Subjective: Dmitri reports feeling good today. She would like to try cupping again today because it helped.    Objective: See treatment diary below.    Assessment: Dmitri tolerated treatment well with consistent cuing throughout. TE's were performed with increased reps and increased resistance. New TE's were demonstrated with proper technique, and tolerated well. Following treatment, the patient demonstrated fatigue and would benefit from continued physical therapy.    Plan: Continue per plan of care.  Progress treatment as tolerated.         POC expires Unit limit Auth  expiration date PT/OT + Visit Limit?    N/A 24 BOMN/ auth 24 visits.                  Visit/Unit Tracking  AUTH Status:  Date 8/21 8/23 8/26 8/28 8/30 9/3 9/4 9/6 9/9 9/12 8/16 8/19   Pending Used 25 26 37 36 37 38 39 40 41 42 23 24    Remaining  1 15 14 13 12 11 10 9 8 7 1 0     Precautions: s/p R arthroscopic labral repair (DOS: 24)    Access Code: [email or web address]  URL: [email or web address]  Date: 2024  Prepared by: Peter Marx    Manuals 9/4 9/6 9/9 9/12 9/16  8/26 8/28 8/30 9/3   R shoulder PROM per protocol             R shoulder mobilizations per protocol Gr 4 AP/inf GHJ mobs  Gr 4 inf AC/SCJ mobs TS grade ll-lll  AC and SC joint inferior  TS g3-4 post/inf MWM JF Grade III-IV     TS    R elbow ROM             PROM SL ABD with Scapular upward rotation mob    JF          R AC joint mob   TS G2-3 MWM ad/ab/flexion PA mobs AC and SC jt JF Grade III         TPR right pec minor, subscapularis and lats    JF         Rhythmic  "stabilization         TS 30\"x3 at 90* elbow flx    Subscap cupping in SL Active flexion 10x5\" passive scaption 10x5\"    5x cups, SL ab, wall roll, ball flexion 10x ea        Re-eval             Neuro Re-Ed             Scap squeezes             Shrug hold and relax             Prone ITY        3x 10 1#      Prone Row        3# 3x 10      Serratus punch        NP      Webslide M, L  [place] 3x10 15#  L 2x10   3x15 Black   Violet   3x10 14#, 10# Black 3x 10  [place] 3x10 15#/10.2#     Wall med ball 4 ways   RMB 20x ea       X20 ea RMB   Wall walk TB YTB 10x  YTB 10x       YTB 10x   Wall slide AAROM foam roller 10x10\" 10x10\"        10x10\"    TB clock   5x YTB          Ther Ex             Patient education: pathophysiology, surgical overview, rehab protocol, signs/symptoms of infection, HEP review             UBE for posture and cardio 4'/4' L5 4'/4'  L5 L5 5'/5'  L5 5'/5'  L5 5'/5'   4'/4'  4'/4' 4'/4' L4 4'/4' L5   Prone UE ranger abd.             AAROM table slides flex/ab             Bent over Flexion table foam   3-5\"   10x           TB cross adduction w/ CL push out     3x10 RTB                     Adduction TB BTB 3x10         BTB 3x10    Lateral Raises          1# 3x10    Pulleys              ER TB        [place] 3# 3x10  BTB 3x 10  BTB 3x10     IR TB       [place]  2.2# 2x10 - walks NV BTB 3x10 BTB 3x10 no pain until 3x8    TB Tricep pulldown       Black 3x10  Black 3x 10      Bicep curl TB        6# 3x 12                   Ther Activity             Total gym pullups prone     L17 2x6 !     L13 2x6 pillow                Gait Training                                       Modalities             CP                              Peter Marx, PT  9/16/2024,12:49 PM  "

## 2024-09-19 ENCOUNTER — APPOINTMENT (OUTPATIENT)
Dept: PHYSICAL THERAPY | Facility: CLINIC | Age: 29
End: 2024-09-19
Payer: OTHER MISCELLANEOUS

## 2024-09-23 ENCOUNTER — OFFICE VISIT (OUTPATIENT)
Dept: PHYSICAL THERAPY | Facility: CLINIC | Age: 29
End: 2024-09-23
Payer: OTHER MISCELLANEOUS

## 2024-09-23 DIAGNOSIS — Z98.890 S/P ARTHROSCOPY OF RIGHT SHOULDER: Primary | ICD-10-CM

## 2024-09-23 DIAGNOSIS — S43.431D TEAR OF RIGHT GLENOID LABRUM, SUBSEQUENT ENCOUNTER: ICD-10-CM

## 2024-09-23 PROCEDURE — 97110 THERAPEUTIC EXERCISES: CPT | Performed by: PHYSICAL THERAPIST

## 2024-09-23 PROCEDURE — 97112 NEUROMUSCULAR REEDUCATION: CPT | Performed by: PHYSICAL THERAPIST

## 2024-09-23 NOTE — PROGRESS NOTES
"Daily Note     Today's date: 2024  Patient name: Dmitri Sharma  : 1995  MRN: [id number]  Referring provider: +Chrystal Yun PA-C  Dx:   Encounter Diagnosis     ICD-10-CM    1. S/P arthroscopy of right shoulder  Z98.890       2. Tear of right glenoid labrum, subsequent encounter  S43.431D                      Subjective: Patient reports no significant changes.      Objective: See treatment diary below      Assessment: Tolerated treatment well. Patient demonstrated fatigue post treatment and would benefit from continued PT.  Two new exercises added today for shoulder strengthening.  Patient reports muscle burn and denies pain.      Plan: Continue per plan of care.  Progress treatment as tolerated.       POC expires Unit limit Auth  expiration date PT/OT + Visit Limit?    N/A 24 BOMN/ auth 24 visits.                  Visit/Unit Tracking  AUTH Status:  Date 8/21 8/23 8/26 8/28 8/30 9/3 9/4 9/6 9/9 9/12 9/16 9/23   Pending Used 25 26 37 36 37 38 39 40 41 42 43 44    Remaining  1 15 14 13 12 11 10 9 8 7 6 5     Precautions: s/p R arthroscopic labral repair (DOS: 24)    Access Code: [email or web address]  URL: [email or web address]  Date: 2024  Prepared by: Peter Marx    Manuals 9/4 9/6 9/9 9/12 9/16 9/23 8/26 8/28 8/30 9/3   R shoulder PROM per protocol             R shoulder mobilizations per protocol Gr 4 AP/inf GHJ mobs  Gr 4 inf AC/SCJ mobs TS grade ll-lll  AC and SC joint inferior  TS g3-4 post/inf MWM JF Grade III-IV     TS    R elbow ROM             PROM SL ABD with Scapular upward rotation mob    JF          R AC joint mob   TS G2-3 MWM ad/ab/flexion PA mobs AC and SC jt JF Grade III         TPR right pec minor, subscapularis and lats    JF         Rhythmic stabilization         TS 30\"x3 at 90* elbow flx    Subscap cupping in SL Active flexion 10x5\" passive scaption 10x5\"    5x cups, SL ab, wall roll, ball flexion 10x ea        Re-eval             Neuro Re-Ed             Scap " "squeezes             Shrug hold and relax             Prone ITY        3x 10 1#      Prone Row        3# 3x 10      Serratus punch        NP      Webslide M, L  [place] 3x10 15#  L 2x10   3x15 Black  3x15 Black  [place]   3x10 14#, 10# Black 3x 10  Violet 3x10 15#/10.2#     Wall med ball 4 ways   RMB 20x ea       X20 ea RMB   Wall walk TB YTB 10x  YTB 10x       YTB 10x   Wall slide AAROM foam roller 10x10\" 10x10\"        10x10\"    TB clock   5x YTB          Ther Ex             Patient education: pathophysiology, surgical overview, rehab protocol, signs/symptoms of infection, HEP review             UBE for posture and cardio 4'/4' L5 4'/4'  L5 L5 5'/5'  L5 5'/5'  L5 5'/5'  5 5'/5'  4'/4'  4'/4' 4'/4' L4 4'/4' L5   Prone UE ranger abd.             AAROM table slides flex/ab             Bent over Flexion table foam   3-5\"   10x           TB cross adduction w/ CL push out     3x10 RTB 3x10 RTB                    Adduction TB BTB 3x10     BTB 3x10    BTB 3x10    Lateral Raises          1# 3x10    Pulleys              ER TB        [place] 3# 3x10  BTB 3x 10  BTB 3x10     IR TB       [place]  2.2# 2x10 - walks NV BTB 3x10 BTB 3x10 no pain until 3x8    TB Tricep pulldown       Black 3x10  Black 3x 10      Bicep curl TB        6# 3x 12      DB ER at 90 deg ABD      2# 2x10       DB shoulder press at wall in shld flex      2# 2x10       Ther Activity             Total gym pullups prone     L17 2x6 ! Declined today    L13 2x6 pillow                Gait Training                                       Modalities             CP                                "

## 2024-09-26 ENCOUNTER — OFFICE VISIT (OUTPATIENT)
Dept: PHYSICAL THERAPY | Facility: CLINIC | Age: 29
End: 2024-09-26
Payer: OTHER MISCELLANEOUS

## 2024-09-26 DIAGNOSIS — S43.431D TEAR OF RIGHT GLENOID LABRUM, SUBSEQUENT ENCOUNTER: ICD-10-CM

## 2024-09-26 DIAGNOSIS — Z98.890 S/P ARTHROSCOPY OF RIGHT SHOULDER: Primary | ICD-10-CM

## 2024-09-26 PROCEDURE — 97110 THERAPEUTIC EXERCISES: CPT | Performed by: PHYSICAL THERAPIST

## 2024-09-26 PROCEDURE — 97112 NEUROMUSCULAR REEDUCATION: CPT | Performed by: PHYSICAL THERAPIST

## 2024-09-26 NOTE — PROGRESS NOTES
Daily Note     Today's date: 2024  Patient name: Dmitri Sharma  : 1995  MRN: [id number]  Referring provider: +Chrystal Yun PA-C  Dx:   Encounter Diagnosis     ICD-10-CM    1. S/P arthroscopy of right shoulder  Z98.890       2. Tear of right glenoid labrum, subsequent encounter  S43.431D           Start Time: 1215  Stop Time: 1301  Total time in clinic (min): 46 minutes    Subjective: Patient reports that she sees [facility] 10/7 and believes she will be cleared to return to work at that time. Patient is feeling slightly apprehensive about this because she works 6 days a week and it will require a lot of endurance from her upper extremities.       Objective: See treatment diary below      Assessment: Patient continues to tolerate treatment well but also demonstrates good muscular fatigue post session. Medball stability exercises performed in frontal plane in addition to sagittal plane today to work on strength/endurance in abducted position. Continue to progress scapular/rotator cuff stabilization/endurance as able.       Plan: Continue per plan of care.      POC expires Unit limit Auth  expiration date PT/OT + Visit Limit?    N/A 24 BOMN/ auth 24 visits.                  Visit/Unit Tracking  AUTH Status:  Date 9/26  8/26 8/28 8/30 9/3 9/4 9/6 9/9 9/12 9/16 9/23   Pending Used 45  37 36 37 38 39 40 41 42 43 44    Remaining  4  14 13 12 11 10 9 8 7 6 5     Precautions: s/p R arthroscopic labral repair (DOS: 24)    Access Code: [email or web address]  URL: [email or web address]  Date: 2024  Prepared by: Peter Marx    Manuals 9/4 9/6 9/9 9/12 9/16 9/23 9/26 8/28 8/30 9/3   R shoulder PROM per protocol             R shoulder mobilizations per protocol Gr 4 AP/inf GHJ mobs  Gr 4 inf AC/SCJ mobs TS grade ll-lll  AC and SC joint inferior  TS g3-4 post/inf MWM JF Grade III-IV     TS    R elbow ROM             PROM SL ABD with Scapular upward rotation mob    JF          R AC joint mob   TS  "G2-3 MWM ad/ab/flexion PA mobs AC and SC jt JF Grade III         TPR right pec minor, subscapularis and lats    JF         Rhythmic stabilization         TS 30\"x3 at 90* elbow flx    Subscap cupping in SL Active flexion 10x5\" passive scaption 10x5\"    5x cups, SL ab, wall roll, ball flexion 10x ea        Re-eval             Neuro Re-Ed             Scap squeezes             Shrug hold and relax             Prone ITY        3x 10 1#      Prone Row        3# 3x 10      Serratus punch        NP      Webslide M, L  [place] 3x10 15#  L 2x10   3x15 Black  3x15 Black  3x15 Black Black 3x 10  [place] 3x10 15#/10.2#     Wall med ball 4 ways   RMB 20x ea    RMB 20x ea  GMB 20x ea shldr abducted   X20 ea RMB   Wall walk TB YTB 10x  YTB 10x       YTB 10x   Wall slide AAROM foam roller 10x10\" 10x10\"        10x10\"    TB clock   5x YTB          Ther Ex             Patient education: pathophysiology, surgical overview, rehab protocol, signs/symptoms of infection, HEP review             UBE for posture and cardio 4'/4' L5 4'/4'  L5 L5 5'/5'  L5 5'/5'  L5 5'/5'  5 5'/5'  L5 5'/5' 4'/4' 4'/4' L4 4'/4' L5   Prone UE ranger abd.             AAROM table slides flex/ab             Bent over Flexion table foam   3-5\"   10x           TB cross adduction w/ CL push out     3x10 RTB 3x10 RTB 3x10 RTB                   Adduction TB BTB 3x10     BTB 3x10    BTB 3x10    Lateral Raises          1# 3x10    Pulleys              ER TB         BTB 3x 10  BTB 3x10     IR TB        BTB 3x10 BTB 3x10 no pain until 3x8    TB Tricep pulldown        Black 3x 10      Bicep curl TB        6# 3x 12      DB ER at 90 deg ABD      2# 2x10       DB shoulder press at wall in shld flex      2# 2x10       Ther Activity             Total gym pullups prone     L17 2x6 ! Declined today    L13 2x6 pillow                Gait Training                                       Modalities             CP                                  "

## 2024-09-30 ENCOUNTER — OFFICE VISIT (OUTPATIENT)
Dept: PHYSICAL THERAPY | Facility: CLINIC | Age: 29
End: 2024-09-30
Payer: OTHER MISCELLANEOUS

## 2024-09-30 DIAGNOSIS — S43.431D TEAR OF RIGHT GLENOID LABRUM, SUBSEQUENT ENCOUNTER: ICD-10-CM

## 2024-09-30 DIAGNOSIS — Z98.890 S/P ARTHROSCOPY OF RIGHT SHOULDER: Primary | ICD-10-CM

## 2024-09-30 PROCEDURE — 97110 THERAPEUTIC EXERCISES: CPT | Performed by: PHYSICAL THERAPIST

## 2024-09-30 PROCEDURE — 97112 NEUROMUSCULAR REEDUCATION: CPT | Performed by: PHYSICAL THERAPIST

## 2024-09-30 NOTE — PROGRESS NOTES
Daily Note    Today's date: 24  Patient name: Dmitri Sharma  : 1995  MRN: [id number]  Referring provider: +Chrystal Yun PA-C  Dx:   Encounter Diagnosis     ICD-10-CM    1. S/P arthroscopy of right shoulder  Z98.890       2. Tear of right glenoid labrum, subsequent encounter  S43.431D           Start Time: 1123  Stop Time: 1209  Total time in clinic (min): 46 minutes      Subjective: Dmitri presents to PT feeling feeling okay today. Patient reports feeling fine since previous PT session. Patient reports HEP is going well.     Objective: See treatment diary below.    Assessment: Dmitri tolerated treatment well with minimal cuing. TE's were performed with increased reps and increased resistance. New TE's were demonstrated with proper technique, and tolerated well.Following treatment, the patient demonstrated fatigue and would benefit from continued physical therapy. Patient was able to complete all of their exercises without any adverse symptoms.     Plan: Continue per plan of care.  Progress treatment as tolerated.         POC expires Unit limit Auth  expiration date PT/OT + Visit Limit?    N/A 24 BOMN/ auth 24 visits.                  Visit/Unit Tracking  AUTH Status:  Date 9/26 9/30  8/28 8/30 9/3 9/4 9/6 9/9 9/12 9/16 9/23   Pending Used 45 46  36 37 38 39 40 41 42 43 44    Remaining  4 3  13 12 11 10 9 8 7 6 5     Precautions: s/p R arthroscopic labral repair (DOS: 24)    Access Code: [email or web address]  URL: [email or web address]  Date: 2024  Prepared by: Peter Marx    Manuals 9/4 9/6 9/9 9/12 9/16 9/23 9/26 9/30  9/3   R shoulder PROM per protocol             R shoulder mobilizations per protocol Gr 4 AP/inf GHJ mobs  Gr 4 inf AC/SCJ mobs TS grade ll-lll  AC and SC joint inferior  TS g3-4 post/inf MWM JF Grade III-IV         R elbow ROM             PROM SL ABD with Scapular upward rotation mob    JF          R AC joint mob   TS G2-3 MWM ad/ab/flexion PA mobs AC and  "SC jt JF Grade III         TPR right pec minor, subscapularis and lats    JF         Rhythmic stabilization             Subscap cupping in SL Active flexion 10x5\" passive scaption 10x5\"    5x cups, SL ab, wall roll, ball flexion 10x ea        Re-eval             Neuro Re-Ed             Scap squeezes        YTB 30x 3\"     Shrug hold and relax             Prone ITY             Prone Row             Serratus punch             Webslide M, L  [place] 3x10 15#  L 2x10   3x15 Black  3x15 Black  3x15 Black 4x10 Black      Wall med ball 4 ways   RMB 20x ea    RMB 20x ea  GMB 20x ea shldr abducted RMB 20x ea  GMB 20x ea shldr abducted  X20 ea RMB   Wall walk TB YTB 10x  YTB 10x       YTB 10x   Wall slide AAROM foam roller 10x10\" 10x10\"        10x10\"    TB clock   5x YTB     5x RTB     Ther Ex             Patient education: pathophysiology, surgical overview, rehab protocol, signs/symptoms of infection, HEP review             UBE for posture and cardio 4'/4' L5 4'/4'  L5 L5 5'/5'  L5 5'/5'  L5 5'/5'  5 5'/5'  L5 5'/5' L5   5'/5'  4'/4' L5   Prone UE ranger abd.             AAROM table slides flex/ab             Bent over Flexion table foam   3-5\"   10x           TB cross adduction w/ CL push out     3x10 RTB 3x10 RTB 3x10 RTB                   Adduction TB BTB 3x10     BTB 3x10  RTB  3x10  BTB 3x10    Lateral Raises          1# 3x10    Pulleys              ER TB              IR TB             TB Tricep pulldown        [place] 11# 3x10     Bicep curl TB             DB ER at 90 deg ABD      2# 2x10    2# 2x10     DB shoulder press at wall in shld flex      2# 2x10   2# 2x10     Ther Activity             Total gym pullups prone     L17 2x6 ! Declined today    L13 2x6 pillow                Gait Training                                       Modalities             CP                                                  "

## 2024-10-03 ENCOUNTER — OFFICE VISIT (OUTPATIENT)
Dept: PHYSICAL THERAPY | Facility: CLINIC | Age: 29
End: 2024-10-03
Payer: COMMERCIAL

## 2024-10-03 DIAGNOSIS — S43.431D TEAR OF RIGHT GLENOID LABRUM, SUBSEQUENT ENCOUNTER: ICD-10-CM

## 2024-10-03 DIAGNOSIS — Z98.890 S/P ARTHROSCOPY OF RIGHT SHOULDER: Primary | ICD-10-CM

## 2024-10-03 PROCEDURE — 97110 THERAPEUTIC EXERCISES: CPT

## 2024-10-03 PROCEDURE — 97112 NEUROMUSCULAR REEDUCATION: CPT

## 2024-10-03 PROCEDURE — 97140 MANUAL THERAPY 1/> REGIONS: CPT

## 2024-10-03 NOTE — PROGRESS NOTES
"Daily Note    Today's date: 10/03/24  Patient name: Dmitri Sharma  : 1995  MRN: [id number]  Referring provider: +Chrystal Yun PA-C  Dx:   Encounter Diagnosis     ICD-10-CM    1. S/P arthroscopy of right shoulder  Z98.890       2. Tear of right glenoid labrum, subsequent encounter  S43.431D           Start Time: 1215  Stop Time: 1300  Total time in clinic (min): 45 minutes      Subjective: Dmitri reports some stiffness today.    Objective: See treatment diary below.    Assessment: Dmitri tolerated treatment well with consistent cuing throughout. TE's were performed with increased reps and increased resistance. No new TE's were performed today. Following treatment, the patient demonstrated fatigue and would benefit from continued physical therapy.    Plan: Continue per plan of care.  Progress treatment as tolerated.         POC expires Unit limit Auth  expiration date PT/OT + Visit Limit?    N/A 24 BOMN/ auth 24 visits.                  Visit/Unit Tracking  AUTH Status:  Date 9/26 9/30 10/3  8/30 9/3 9/4 9/6 9/9 9/12 9/16 9/23   Pending Used 45 46 47  37 38 39 40 41 42 43 44    Remaining  4 3 2  12 11 10 9 8 7 6 5     Precautions: s/p R arthroscopic labral repair (DOS: 24)    Access Code: [email or web address]  URL: [email or web address]  Date: 2024  Prepared by: Peter Marx    Manuals 9/4 9/6 9/9 9/12 9/16 9/23 9/26 9/30 10/3    R shoulder PROM per protocol         TS     R shoulder mobilizations per protocol Gr 4 AP/inf GHJ mobs  Gr 4 inf AC/SCJ mobs TS grade ll-lll  AC and SC joint inferior  TS g3-4 post/inf MWM JF Grade III-IV     TS g4    R elbow ROM             PROM SL ABD with Scapular upward rotation mob    JF          R AC joint mob   TS G2-3 MWM ad/ab/flexion PA mobs AC and SC jt JF Grade III     TS g3-4    TPR right pec minor, subscapularis and lats    JF         Rhythmic stabilization             Subscap cupping in SL Active flexion 10x5\" passive scaption 10x5\"    5x " "cups, SL ab, wall roll, ball flexion 10x ea        Re-eval             Neuro Re-Ed             Scap squeezes        YTB 30x 3\"     Shrug hold and relax             Prone ITY             Prone Row             Serratus punch             Webslide M, L  [place] 3x10 15#  L 2x10   3x15 Black  3x15 Black  3x15 Black 4x10 Black  [place]  16/14# 3x12    Wall med ball 4 ways   RMB 20x ea    RMB 20x ea  GMB 20x ea shldr abducted RMB 20x ea  GMB 20x ea shldr abducted     Wall walk TB YTB 10x  YTB 10x          Wall slide AAROM foam roller 10x10\" 10x10\"           TB clock   5x YTB     5x RTB     Ther Ex             Patient education: pathophysiology, surgical overview, rehab protocol, signs/symptoms of infection, HEP review             UBE for posture and cardio 4'/4' L5 4'/4'  L5 L5 5'/5'  L5 5'/5'  L5 5'/5'  5 5'/5'  L5 5'/5' L5   5'/5' L5 5'/5'     Prone UE ranger abd.             AAROM table slides flex/ab             Bent over Flexion table foam   3-5\"   10x           TB cross adduction w/ CL push out     3x10 RTB 3x10 RTB 3x10 RTB                   Adduction TB BTB 3x10     BTB 3x10  RTB  3x10     Lateral Raises             Pulleys              ER TB          [place] 5# 3x12    IR TB         [place] 3.5# 3x12    TB Tricep pulldown        [place] 11# 3x10     Bicep curl TB             DB ER at 90 deg ABD      2# 2x10    2# 2x10     DB shoulder press at wall in shld flex      2# 2x10   2# 2x10     Ther Activity             Total gym pullups prone     L17 2x6 ! Declined today                    Gait Training                                       Modalities             CP                                      Peter Marx, PT  10/3/2024,2:32 PM  "

## 2024-10-07 ENCOUNTER — OFFICE VISIT (OUTPATIENT)
Dept: PHYSICAL THERAPY | Facility: CLINIC | Age: 29
End: 2024-10-07
Payer: OTHER MISCELLANEOUS

## 2024-10-07 DIAGNOSIS — S43.431D TEAR OF RIGHT GLENOID LABRUM, SUBSEQUENT ENCOUNTER: ICD-10-CM

## 2024-10-07 DIAGNOSIS — Z98.890 S/P ARTHROSCOPY OF RIGHT SHOULDER: Primary | ICD-10-CM

## 2024-10-07 PROCEDURE — 97140 MANUAL THERAPY 1/> REGIONS: CPT

## 2024-10-07 PROCEDURE — 97112 NEUROMUSCULAR REEDUCATION: CPT

## 2024-10-07 PROCEDURE — 97110 THERAPEUTIC EXERCISES: CPT

## 2024-10-07 NOTE — PROGRESS NOTES
Daily Note    Today's date: 10/07/24  Patient name: Dmitri Sharma  : 1995  MRN: [id number]  Referring provider: +Chrystal Yun PA-C  Dx:   Encounter Diagnosis     ICD-10-CM    1. S/P arthroscopy of right shoulder  Z98.890       2. Tear of right glenoid labrum, subsequent encounter  S43.431D           Start Time: 1128  Stop Time: 1217  Total time in clinic (min): 49 minutes      Subjective: Dmitri presents to PT feeling fine today. Patient reports having muscle burning sensation for a little bit following previous PT session. Patient mentioned feeling fine since last PT.     Objective: See treatment diary below.    Assessment: Dmitri tolerated treatment well with minimal cuing.  New TE's were demonstrated with proper technique, and tolerated well. Patient performed body blade exercises with R shoulder 90 degrees of flexion vertical and horizontal. Following treatment, the patient demonstrated fatigue and would benefit from continued physical therapy. Patient was able to complete all of their exercises without any modifications or adverse symptoms.     Plan: Continue per plan of care.  Progress treatment as tolerated.   Re-evaluation next visit.        POC expires Unit limit Auth  expiration date PT/OT + Visit Limit?    N/A 24 BOMN/ auth 24 visits.                  Visit/Unit Tracking  AUTH Status:  Date 9/26 9/30 10/3 10/7 8/30 9/3 9/4 9/6 9/9 9/12 9/16 9/23   Pending Used 45 46 47 48 37 38 39 40 41 42 43 44    Remaining  4 3 2 1 12 11 10 9 8 7 6 5     Precautions: s/p R arthroscopic labral repair (DOS: 24)    Access Code: [email or web address]  URL: [email or web address]  Date: 2024  Prepared by: Peter Marx    Manuals  9/6 9/9 9/12 9/16 9/23 9/26 9/30 10/3 10/7   R shoulder PROM per protocol         TS  TW   R shoulder mobilizations per protocol  TS grade ll-lll  AC and SC joint inferior  TS g3-4 post/inf MWM JF Grade III-IV     TS g4    R elbow ROM             PROM SL ABD with  "Scapular upward rotation mob    JF          R AC joint mob   TS G2-3 MWM ad/ab/flexion PA mobs AC and SC jt JF Grade III     TS g3-4 TW  Gr 2-3    TPR right pec minor, subscapularis and lats    JF         Rhythmic stabilization             Subscap cupping in SL     5x cups, SL ab, wall roll, ball flexion 10x ea        Re-eval             Neuro Re-Ed             Scap squeezes        YTB 30x 3\"     Shrug hold and relax             Prone ITY             Prone Row             Serratus punch             Body blade          30\" x2 ea FF 90 vert/ horiz   Webslide M, L  [place] 3x10 15#  L 2x10   3x15 Black  3x15 Black  3x15 Black 4x10 Black  [place]  16/14# 3x12 Violet 16#/ 14#  3x12   Wall med ball 4 ways   RMB 20x ea    RMB 20x ea  GMB 20x ea shldr abducted RMB 20x ea  GMB 20x ea shldr abducted     Wall walk TB   YTB 10x          Wall slide AAROM foam roller  10x10\"           TB clock   5x YTB     5x RTB     Ther Ex             Patient education: pathophysiology, surgical overview, rehab protocol, signs/symptoms of infection, HEP review             UBE for posture and cardio  4'/4'  L5 L5 5'/5'  L5 5'/5'  L5 5'/5'  5 5'/5'  L5 5'/5' L5   5'/5' L5 5'/5'  L5 5'/5'   Prone UE ranger abd.             AAROM table slides flex/ab             Bent over Flexion table foam   3-5\"   10x           TB cross adduction w/ CL push out     3x10 RTB 3x10 RTB 3x10 RTB                   Adduction TB      BTB 3x10  RTB  3x10     Lateral Raises             Pulleys              ER TB          [place] 5# 3x12 [place] 5# 2x10   IR TB         [place] 3.5# 3x12 [place] 3.5# 2x10    TB Tricep pulldown        [place] 11# 3x10     Bicep curl TB             DB ER at 90 deg ABD      2# 2x10    2# 2x10  1#  2x10   DB shoulder press at wall in shld flex      2# 2x10   2# 2x10  1# 2x10   Ther Activity             Total gym pullups prone     L17 2x6 ! Declined today                    Gait Training                                       Modalities             CP   "

## 2024-10-09 ENCOUNTER — OFFICE VISIT (OUTPATIENT)
Dept: OBGYN CLINIC | Facility: CLINIC | Age: 29
End: 2024-10-09
Payer: COMMERCIAL

## 2024-10-09 VITALS
SYSTOLIC BLOOD PRESSURE: 118 MMHG | HEART RATE: 80 BPM | DIASTOLIC BLOOD PRESSURE: 83 MMHG | BODY MASS INDEX: 19.97 KG/M2 | WEIGHT: 117 LBS | HEIGHT: 64 IN

## 2024-10-09 DIAGNOSIS — Z98.890 S/P ARTHROSCOPY OF RIGHT SHOULDER: Primary | ICD-10-CM

## 2024-10-09 PROCEDURE — 99213 OFFICE O/P EST LOW 20 MIN: CPT | Performed by: STUDENT IN AN ORGANIZED HEALTH CARE EDUCATION/TRAINING PROGRAM

## 2024-10-09 NOTE — LETTER
October 9, 2024     Patient: Dmitri Sharma  YOB: 1995  Date of Visit: 10/9/2024      To Whom it May Concern:    Dmitri Sharma is under my professional care. Dmitri was seen in my office on 10/9/2024. Dmitri may return to work on 10/11/2024 without restrictions .    If you have any questions or concerns, please don't hesitate to call.         Sincerely,          Zhen Wagner MD        CC: No Recipients

## 2024-10-09 NOTE — PROGRESS NOTES
Shoulder Post Operative Visit     Assesment:   29 y.o. female s/p surgical arthroscopy of the right shoulder with posterior labral repair, DOS: 5/28/24    Plan:  Patient is doing well 4.5 months status post above procedure.  She has been working with physical therapy and has made significant improvements in her range of motion compared to her previous visit.  On exam, her incisions are well-healed.  She has full range of motion of the shoulder.  She has excellent rotator cuff strength.  She has no instability or pain with anterior posterior load-and-shift exam.  Patient states that she would like to return to work but is a little apprehensive about her stamina with regards to the workload.  We did have a long discussion at which point she would prefer to return with no restrictions at this point.  We did provide her with a work note stating such.  We did instruct her to reach out if she has any concerns and we can addend the work note.  She has her last physical therapy session today at which point she will be discharged from therapy.  I discussed that she should reach out over the next few weeks if she has any issues with work.  Otherwise, she can follow-up on an as-needed basis. The patient demonstrated understanding of the discussion and was in agreement with the plan.  All of the questions were answered.  Patient can reach out to clinic with any questions or concerns at any time.    Follow up:   PRN      Chief Complaint   Patient presents with    Right Shoulder - Follow-up, Pain       History of Present Illness:  The patient is a right-hand-dominant 29 y.o. female who is being evaluated post operatively 4.5 months status post partial arthroscopy with posterior labral repair.      She is progressing well post-operatively. The patient states that her range of motion has improved significantly since her last visit. The patient reports improvements in strength although she does not feel like she returned to baseline  "at this point. The patient states that she would like to return to work as a , however, she is worried that she may not be able to keep up with the load. She has resumed most activities of daily living.       Review of systems: ROS is negative other than that noted in the HPI.  Constitutional: Negative for fatigue and fever.       Physical Exam:    Blood pressure 118/83, pulse 80, height 5' 3.5\" (1.613 m), weight 53.1 kg (117 lb).    General/Constitutional: NAD, well developed, well nourished  HENT: Normocephalic, atraumatic  CV: Intact distal pulses, regular rate  Resp: No respiratory distress or labored breathing  GI: Soft and non-tender   Lymphatic: No lymphadenopathy palpated  Neuro: Alert and Oriented x 3, no focal deficits  Psych: Normal mood, normal affect, normal judgement, normal behavior  Skin: Warm, dry, no rashes, no erythema    right Shoulder focused exam:  Incisions well-healed with no erythema, no drainage, no dehiscence  Tender to palpation: none  ROM: Passive FF: 160, ER 50, IR T10  Rotator cuff strength: 5/5  Scapular position: normal  Stability: no pain or instability with anterior and posterior load and shift, negative apprehension    UE NV Exam:   +2 Radial pulses   Sensation intact to light touch C5-T1 bilaterally, SILT median/ulnar/radial distribution  Radial/median/ulnar/PIN/AIN nerve motor intact      Scribe Attestation      I,:  Fabienne Moore am acting as a scribe while in the presence of the attending physician.:       I,:  Zhen Wagner MD personally performed the services described in this documentation    as scribed in my presence.:           "

## 2024-10-10 ENCOUNTER — OFFICE VISIT (OUTPATIENT)
Dept: PHYSICAL THERAPY | Facility: CLINIC | Age: 29
End: 2024-10-10
Payer: OTHER MISCELLANEOUS

## 2024-10-10 DIAGNOSIS — S43.431D TEAR OF RIGHT GLENOID LABRUM, SUBSEQUENT ENCOUNTER: Primary | ICD-10-CM

## 2024-10-10 DIAGNOSIS — Z98.890 S/P ARTHROSCOPY OF RIGHT SHOULDER: ICD-10-CM

## 2024-10-10 PROCEDURE — 97140 MANUAL THERAPY 1/> REGIONS: CPT

## 2024-10-10 PROCEDURE — 97110 THERAPEUTIC EXERCISES: CPT

## 2024-10-10 NOTE — PROGRESS NOTES
Daily Note     Today's date: 10/10/2024  Patient name: Dmitri Sharma  : 1995  MRN: [id number]  Referring provider: +Chrystal Yun PA-C  Dx:   Encounter Diagnosis     ICD-10-CM    1. Tear of right glenoid labrum, subsequent encounter  S43.431D       2. S/P arthroscopy of right shoulder  Z98.890           Start Time: 1208  Stop Time: 1250  Total time in clinic (min): 42 minutes    Subjective: no pain noted an feeling good. She noted that she is to return to work tomorrow on full duty.       Objective: See treatment diary below      Assessment:  Continued with treatment session no changes in exercises secondary to today being last PT visit. Reviewed all ways to stretch at home. Tolerated treatment well. Patient exhibited good technique with therapeutic exercises and would benefit from continued PT.      Plan: Continue per plan of care.  Will be D/C from PT services after this treatment session by evaluating therapist.       POC expires Unit limit Auth  expiration date PT/OT + Visit Limit?    N/A 24 BOMN/ auth 24 visits.                  Visit/Unit Tracking  AUTH Status:  Date 9/26 9/30 10/3 10/7 10/10 9/3 9/4 9/6 9/9 9/12 9/16 9/23   Pending Used 45 46 47 48 49 38 39 40 41 42 43 44    Remaining  4 3 2 1  11 10 9 8 7 6 5     Precautions: s/p R arthroscopic labral repair (DOS: 24)    Access Code: [email or web address] - updated on 10/10  URL: [email or web address]  Date: 2024  Prepared by: Peter Marx    Manuals 10/10 9/6 9/9 9/12 9/16 9/23 9/26 9/30 10/3 10/7   R shoulder PROM per protocol SC        TS  TW   R shoulder mobilizations per protocol  TS grade ll-lll  AC and SC joint inferior  TS g3-4 post/inf MWM JF Grade III-IV     TS g4    R elbow ROM             PROM SL ABD with Scapular upward rotation mob    JF          R AC joint mob   TS G2-3 MWM ad/ab/flexion PA mobs AC and SC jt JF Grade III     TS g3-4 TW  Gr 2-3    TPR right pec minor, subscapularis and lats    JF         Rhythmic  "stabilization             Subscap cupping in SL     5x cups, SL ab, wall roll, ball flexion 10x ea        Re-eval             Neuro Re-Ed             Scap squeezes        YTB 30x 3\"     Shrug hold and relax             Prone ITY             Prone Row             Serratus punch             Body blade          30\" x2 ea FF 90 vert/ horiz   Webslide M, L  [place] 3x10 15#  L 2x10   3x15 Black  3x15 Black  3x15 Black 4x10 Black  [place]  16/14# 3x12 [place] 16#/ 14#  3x12   Wall med ball 4 ways   RMB 20x ea    RMB 20x ea  GMB 20x ea shldr abducted RMB 20x ea  GMB 20x ea shldr abducted     Wall walk TB   YTB 10x          Wall slide AAROM foam roller  Wall slides 20x flexion and abd  10x10\"           TB clock   5x YTB     5x RTB     Ther Ex             Patient education: pathophysiology, surgical overview, rehab protocol, signs/symptoms of infection, HEP review             UBE for posture and cardio L5 5'/5' 4'/4'  L5 L5 5'/5'  L5 5'/5'  L5 5'/5'  5 5'/5'  L5 5'/5' L5   5'/5' L5 5'/5'  L5 5'/5'   Prone UE ranger abd.             AAROM table slides flex/ab             Bent over Flexion table foam   3-5\"   10x           TB cross adduction w/ CL push out     3x10 RTB 3x10 RTB 3x10 RTB                   Adduction TB      BTB 3x10  RTB  3x10     Lateral Raises             Pulleys              ER TB          [place] 5# 3x12 [place] 5# 2x10   IR TB         [place] 3.5# 3x12 [place] 3.5# 2x10    TB Tricep pulldown        [place] 11# 3x10     Bicep curl TB             DB ER at 90 deg ABD      2# 2x10    2# 2x10  1#  2x10   DB shoulder press at wall in shld flex      2# 2x10   2# 2x10  1# 2x10   Ther Activity             Total gym pullups prone     L17 2x6 ! Declined today                    Gait Training                                       Modalities             CP                                          "

## 2024-10-11 ENCOUNTER — VBI (OUTPATIENT)
Dept: ADMINISTRATIVE | Facility: OTHER | Age: 29
End: 2024-10-11

## 2024-10-11 NOTE — TELEPHONE ENCOUNTER
10/11/24 10:49 AM     Chart reviewed for Cervical Cancer Screening was/were not submitted to the patient's insurance.     Anne You MA   [facility]

## 2024-12-05 ENCOUNTER — TELEPHONE (OUTPATIENT)
Dept: INTERNAL MEDICINE CLINIC | Facility: CLINIC | Age: 29
End: 2024-12-05

## 2025-05-27 DIAGNOSIS — Z13.1 SCREENING FOR DIABETES MELLITUS: ICD-10-CM

## 2025-05-27 DIAGNOSIS — S43.431A LABRAL TEAR OF SHOULDER, RIGHT, INITIAL ENCOUNTER: ICD-10-CM

## 2025-05-27 DIAGNOSIS — Z01.812 PRE-OPERATIVE LABORATORY EXAMINATION: ICD-10-CM

## 2025-05-27 DIAGNOSIS — E55.9 VITAMIN D DEFICIENCY: ICD-10-CM

## 2025-05-27 DIAGNOSIS — Z00.00 ANNUAL PHYSICAL EXAM: Primary | ICD-10-CM

## 2025-05-27 DIAGNOSIS — Z13.220 SCREENING FOR HYPERLIPIDEMIA: ICD-10-CM

## 2025-05-28 ENCOUNTER — APPOINTMENT (OUTPATIENT)
Dept: LAB | Facility: CLINIC | Age: 30
End: 2025-05-28
Payer: COMMERCIAL

## 2025-05-28 DIAGNOSIS — E55.9 VITAMIN D DEFICIENCY: ICD-10-CM

## 2025-05-28 DIAGNOSIS — Z00.00 ANNUAL PHYSICAL EXAM: ICD-10-CM

## 2025-05-28 DIAGNOSIS — Z13.1 SCREENING FOR DIABETES MELLITUS: ICD-10-CM

## 2025-05-28 DIAGNOSIS — Z13.220 SCREENING FOR HYPERLIPIDEMIA: ICD-10-CM

## 2025-05-28 LAB
ALBUMIN SERPL BCG-MCNC: 4.7 G/DL (ref 3.5–5)
ALP SERPL-CCNC: 75 U/L (ref 34–104)
ALT SERPL W P-5'-P-CCNC: 16 U/L (ref 7–52)
ANION GAP SERPL CALCULATED.3IONS-SCNC: 9 MMOL/L (ref 4–13)
AST SERPL W P-5'-P-CCNC: 18 U/L (ref 13–39)
BILIRUB SERPL-MCNC: 0.95 MG/DL (ref 0.2–1)
BUN SERPL-MCNC: 9 MG/DL (ref 5–25)
CALCIUM SERPL-MCNC: 9.7 MG/DL (ref 8.4–10.2)
CHLORIDE SERPL-SCNC: 102 MMOL/L (ref 96–108)
CHOLEST SERPL-MCNC: 157 MG/DL (ref ?–200)
CO2 SERPL-SCNC: 26 MMOL/L (ref 21–32)
CREAT SERPL-MCNC: 0.81 MG/DL (ref 0.6–1.3)
ERYTHROCYTE [DISTWIDTH] IN BLOOD BY AUTOMATED COUNT: 12.1 % (ref 11.6–15.1)
EST. AVERAGE GLUCOSE BLD GHB EST-MCNC: 114 MG/DL
GFR SERPL CREATININE-BSD FRML MDRD: 96 ML/MIN/1.73SQ M
GLUCOSE P FAST SERPL-MCNC: 93 MG/DL (ref 65–99)
HBA1C MFR BLD: 5.6 %
HCT VFR BLD AUTO: 43.5 % (ref 34.8–46.1)
HDLC SERPL-MCNC: 56 MG/DL
HGB BLD-MCNC: 14.1 G/DL (ref 11.5–15.4)
LDLC SERPL CALC-MCNC: 87 MG/DL (ref 0–100)
MCH RBC QN AUTO: 30.8 PG (ref 26.8–34.3)
MCHC RBC AUTO-ENTMCNC: 32.4 G/DL (ref 31.4–37.4)
MCV RBC AUTO: 95 FL (ref 82–98)
NONHDLC SERPL-MCNC: 101 MG/DL
PLATELET # BLD AUTO: 254 THOUSANDS/UL (ref 149–390)
PMV BLD AUTO: 10.8 FL (ref 8.9–12.7)
POTASSIUM SERPL-SCNC: 4.5 MMOL/L (ref 3.5–5.3)
PROT SERPL-MCNC: 7.4 G/DL (ref 6.4–8.4)
RBC # BLD AUTO: 4.58 MILLION/UL (ref 3.81–5.12)
SODIUM SERPL-SCNC: 137 MMOL/L (ref 135–147)
TRIGL SERPL-MCNC: 69 MG/DL (ref ?–150)
WBC # BLD AUTO: 7.05 THOUSAND/UL (ref 4.31–10.16)

## 2025-05-28 PROCEDURE — 36415 COLL VENOUS BLD VENIPUNCTURE: CPT

## 2025-05-28 PROCEDURE — 83036 HEMOGLOBIN GLYCOSYLATED A1C: CPT

## 2025-05-28 PROCEDURE — 82306 VITAMIN D 25 HYDROXY: CPT

## 2025-05-28 PROCEDURE — 80061 LIPID PANEL: CPT

## 2025-05-28 PROCEDURE — 85027 COMPLETE CBC AUTOMATED: CPT

## 2025-05-28 PROCEDURE — 80053 COMPREHEN METABOLIC PANEL: CPT

## 2025-05-29 LAB — 25(OH)D3 SERPL-MCNC: 39.6 NG/ML (ref 30–100)

## 2025-06-04 ENCOUNTER — OFFICE VISIT (OUTPATIENT)
Dept: FAMILY MEDICINE CLINIC | Facility: CLINIC | Age: 30
End: 2025-06-04
Payer: COMMERCIAL

## 2025-06-04 VITALS
DIASTOLIC BLOOD PRESSURE: 74 MMHG | TEMPERATURE: 98.4 F | RESPIRATION RATE: 15 BRPM | HEIGHT: 64 IN | HEART RATE: 64 BPM | OXYGEN SATURATION: 100 % | BODY MASS INDEX: 19.53 KG/M2 | WEIGHT: 114.4 LBS | SYSTOLIC BLOOD PRESSURE: 120 MMHG

## 2025-06-04 DIAGNOSIS — R94.6 ABNORMAL RESULTS OF THYROID FUNCTION STUDIES: ICD-10-CM

## 2025-06-04 DIAGNOSIS — E78.00 PURE HYPERCHOLESTEROLEMIA: ICD-10-CM

## 2025-06-04 DIAGNOSIS — Z01.419 ENCOUNTER FOR GYNECOLOGICAL EXAMINATION: ICD-10-CM

## 2025-06-04 DIAGNOSIS — E55.9 VITAMIN D DEFICIENCY: ICD-10-CM

## 2025-06-04 DIAGNOSIS — Z23 ENCOUNTER FOR IMMUNIZATION: ICD-10-CM

## 2025-06-04 DIAGNOSIS — Z79.899 ON LONG TERM DRUG THERAPY: ICD-10-CM

## 2025-06-04 DIAGNOSIS — Z00.00 ANNUAL PHYSICAL EXAM: Primary | Chronic | ICD-10-CM

## 2025-06-04 DIAGNOSIS — R73.01 IMPAIRED FASTING GLUCOSE: ICD-10-CM

## 2025-06-04 PROCEDURE — 90471 IMMUNIZATION ADMIN: CPT

## 2025-06-04 PROCEDURE — 90651 9VHPV VACCINE 2/3 DOSE IM: CPT

## 2025-06-04 PROCEDURE — 99395 PREV VISIT EST AGE 18-39: CPT | Performed by: NURSE PRACTITIONER

## 2025-06-04 NOTE — ASSESSMENT & PLAN NOTE
Component  Ref Range & Units (hover) 5/28/25 11:34 AM 5/1/24  1:56 PM   Hemoglobin A1C 5.6 5.4    108     5.7 puts you in the prediabetic range  Discussed diet and exercise  Discussed carbohydrates (breads, pastas, potatoes)  Discussed sugars    Orders:    Hemoglobin A1C; Future

## 2025-06-04 NOTE — ASSESSMENT & PLAN NOTE
Orders:    Hemoglobin A1C; Future    Lipid Panel with Direct LDL reflex; Future    Vitamin D 25 hydroxy; Future    TSH, 3rd generation with Free T4 reflex; Future

## 2025-06-04 NOTE — PATIENT INSTRUCTIONS
"Patient Education     Routine physical for adults   The Basics   Written by the doctors and editors at [facility]   What is a physical? -- A physical is a routine visit, or \"check-up,\" with your doctor. You might also hear it called a \"wellness visit\" or \"preventive visit.\"  During each visit, the doctor will:   Ask about your physical and mental health   Ask about your habits, behaviors, and lifestyle   Do an exam   Give you vaccines if needed   Talk to you about any medicines you take   Give advice about your health   Answer your questions  Getting regular check-ups is an important part of taking care of your health. It can help your doctor find and treat any problems you have. But it's also important for preventing health problems.  A routine physical is different from a \"sick visit.\" A sick visit is when you see a doctor because of a health concern or problem. Since physicals are scheduled ahead of time, you can think about what you want to ask the doctor.  How often should I get a physical? -- It depends on your age and health. In general, for people age 21 years and older:   If you are younger than 50 years, you might be able to get a physical every 3 years.   If you are 50 years or older, your doctor might recommend a physical every year.  If you have an ongoing health condition, like diabetes or high blood pressure, your doctor will probably want to see you more often.  What happens during a physical? -- In general, each visit will include:   Physical exam - The doctor or nurse will check your height, weight, heart rate, and blood pressure. They will also look at your eyes and ears. They will ask about how you are feeling and whether you have any symptoms that bother you.   Medicines - It's a good idea to bring a list of all the medicines you take to each doctor visit. Your doctor will talk to you about your medicines and answer any questions. Tell them if you are having any side effects that bother you. You " "should also tell them if you are having trouble paying for any of your medicines.   Habits and behaviors - This includes:   Your diet   Your exercise habits   Whether you smoke, drink alcohol, or use drugs   Whether you are sexually active   Whether you feel safe at home  Your doctor will talk to you about things you can do to improve your health and lower your risk of health problems. They will also offer help and support. For example, if you want to quit smoking, they can give you advice and might prescribe medicines. If you want to improve your diet or get more physical activity, they can help you with this, too.   Lab tests, if needed - The tests you get will depend on your age and situation. For example, your doctor might want to check your:   Cholesterol   Blood sugar   Iron level   Vaccines - The recommended vaccines will depend on your age, health, and what vaccines you already had. Vaccines are very important because they can prevent certain serious or deadly infections.   Discussion of screening - \"Screening\" means checking for diseases or other health problems before they cause symptoms. Your doctor can recommend screening based on your age, risk, and preferences. This might include tests to check for:   Cancer, such as breast, prostate, cervical, ovarian, colorectal, prostate, lung, or skin cancer   Sexually transmitted infections, such as chlamydia and gonorrhea   Mental health conditions like depression and anxiety  Your doctor will talk to you about the different types of screening tests. They can help you decide which screenings to have. They can also explain what the results might mean.   Answering questions - The physical is a good time to ask the doctor or nurse questions about your health. If needed, they can refer you to other doctors or specialists, too.  Adults older than 65 years often need other care, too. As you get older, your doctor will talk to you about:   How to prevent falling at " home   Hearing or vision tests   Memory testing   How to take your medicines safely   Making sure that you have the help and support you need at home  All topics are updated as new evidence becomes available and our peer review process is complete.  This topic retrieved from [employer] on: May 02, 2024.  Topic 704313 Version 1.0  Release: 32.4.3 - C32.122  © 2024 UpToDate, Inc. and/or its affiliates. All rights reserved.  Consumer Information Use and Disclaimer   Disclaimer: This generalized information is a limited summary of diagnosis, treatment, and/or medication information. It is not meant to be comprehensive and should be used as a tool to help the user understand and/or assess potential diagnostic and treatment options. It does NOT include all information about conditions, treatments, medications, side effects, or risks that may apply to a specific patient. It is not intended to be medical advice or a substitute for the medical advice, diagnosis, or treatment of a health care provider based on the health care provider's examination and assessment of a patient's specific and unique circumstances. Patients must speak with a health care provider for complete information about their health, medical questions, and treatment options, including any risks or benefits regarding use of medications. This information does not endorse any treatments or medications as safe, effective, or approved for treating a specific patient. UpToDate, Inc. and its affiliates disclaim any warranty or liability relating to this information or the use thereof.The use of this information is governed by the Terms of Use, available at https://www.[employer].com/en/know/clinical-effectiveness-terms. 2024© UpToDate, Inc. and its affiliates and/or licensors. All rights reserved.  Copyright   © 2024 UpToDate, Inc. and/or its affiliates. All rights reserved.

## 2025-06-04 NOTE — ASSESSMENT & PLAN NOTE
Component  Ref Range & Units (hover) 5/28/25 11:34 AM 5/1/24  1:56 PM 1/27/20  4:03 PM   Vit D, 25-Hydroxy 39.6 31.2 CM 13.2 Low      Vitamin D   Your Vitamin D level should be close to 100.  Helps with:  Decreasing Fatigue - Improves Energy  Fights Depression  Decreases Anxiety  Is a Neurotransporter Increasing Serotonin Levels  Improves Bone Health and Helps Prevent Osteoporosis  Is an Essential Nutrient  You Can ONLY Absorb the Proper Amount of Calcium When You Have the Correct Vitamin D Level  Decreases Wrinkles and Fine Lines by working with Collagen Production  Improves Immune Health  Muscle Strength  Brain Cells - To Decrease Brain Fog  Decreases Inflammation  Improved Glucose Metabolism - Helping With Weight Loss  Hair Follicle Health and Growth  Balances Melatonin Levels to Improve Sleep Patterns     START  Vitamin D3 5000 unit capsules daily.    Orders:    Vitamin D 25 hydroxy; Future    Cholecalciferol (Vitamin D3) 125 MCG (5000 UT) CAPS; Take 1 capsule (5,000 Units total) by mouth in the morning

## 2025-06-04 NOTE — PROGRESS NOTES
Adult Annual Physical  Name: Dmitri Sharma      : 1995      MRN: [id number]  Encounter Provider: JENNI Santana  Encounter Date: 2025   Encounter department: [facility]    :  Assessment & Plan  Encounter for immunization    Orders:    HPV VACCINE 9 VALENT IM    Encounter for gynecological examination    Orders:    Ambulatory referral to Obstetrics / Gynecology; Future    Annual physical exam    Orders:    CBC and differential; Future    Comprehensive metabolic panel; Future    Vitamin D deficiency  Component  Ref Range & Units (hover) 25 11:34 AM 24  1:56 PM 20  4:03 PM   Vit D, 25-Hydroxy 39.6 31.2 CM 13.2 Low      Vitamin D   Your Vitamin D level should be close to 100.  Helps with:  Decreasing Fatigue - Improves Energy  Fights Depression  Decreases Anxiety  Is a Neurotransporter Increasing Serotonin Levels  Improves Bone Health and Helps Prevent Osteoporosis  Is an Essential Nutrient  You Can ONLY Absorb the Proper Amount of Calcium When You Have the Correct Vitamin D Level  Decreases Wrinkles and Fine Lines by working with Collagen Production  Improves Immune Health  Muscle Strength  Brain Cells - To Decrease Brain Fog  Decreases Inflammation  Improved Glucose Metabolism - Helping With Weight Loss  Hair Follicle Health and Growth  Balances Melatonin Levels to Improve Sleep Patterns     START  Vitamin D3 5000 unit capsules daily.    Orders:    Vitamin D 25 hydroxy; Future    Cholecalciferol (Vitamin D3) 125 MCG (5000 UT) CAPS; Take 1 capsule (5,000 Units total) by mouth in the morning    Impaired fasting glucose  Component  Ref Range & Units (hover) 25 11:34 AM 24  1:56 PM   Hemoglobin A1C 5.6 5.4    108     5.7 puts you in the prediabetic range  Discussed diet and exercise  Discussed carbohydrates (breads, pastas, potatoes)  Discussed sugars    Orders:    Hemoglobin A1C; Future    On long term drug therapy    Orders:    Hemoglobin  A1C; Future    Lipid Panel with Direct LDL reflex; Future    Vitamin D 25 hydroxy; Future    TSH, 3rd generation with Free T4 reflex; Future    Pure hypercholesterolemia    Orders:    Lipid Panel with Direct LDL reflex; Future    Abnormal results of thyroid function studies    Orders:    TSH, 3rd generation with Free T4 reflex; Future         Preventive Screenings:  - Diabetes Screening: screening up-to-date  - Cholesterol Screening: screening not indicated and has hyperlipidemia   - Hepatitis C screening: screening up-to-date   - HIV screening: screening up-to-date   - Lung cancer screening: screening not indicated     Counseling/Anticipatory Guidance:  - Alcohol: discussed moderation in alcohol intake and recommendations for healthy alcohol use.   - Drug use: discussed harms of illicit drug use and how it can negatively impact mental/physical health.   - Tobacco use: discussed harms of tobacco use and management options for quitting.   - Dental health: discussed importance of regular tooth brushing, flossing, and dental visits.   - Sexual health: discussed sexually transmitted diseases, partner selection, use of condoms, avoidance of unintended pregnancy, and contraceptive alternatives.   - Diet: discussed recommendations for a healthy/well-balanced diet.   - Exercise: the importance of regular exercise/physical activity was discussed. Recommend exercise 3-5 times per week for at least 30 minutes.   - Injury prevention: discussed safety/seat belts, safety helmets, smoke detectors, carbon monoxide detectors, and smoking near bedding or upholstery.          History of Present Illness     Adult Annual Physical:  Patient presents for annual physical.     Diet and Physical Activity:  - Diet/Nutrition: no special diet.  - Exercise: no formal exercise.    Depression Screening:  - PHQ-2 Score: 0    General Health:  - Sleep: 4-6 hours of sleep on average.  - Hearing: normal hearing bilateral ears.  - Vision: no vision  "problems.  - Dental: regular dental visits.    /GYN Health:  - Follows with GYN: no.   - Last menstrual cycle: 6/20/2025.   - History of STDs: no  - Contraception:. not currently sexually active      Advanced Care Planning:  - Has an advanced directive?: no    - Has a durable medical POA?: no    - ACP document given to patient?: no      Review of Systems   Constitutional:  Negative for activity change, chills, fatigue and fever.   HENT:  Negative for rhinorrhea and sore throat.    Eyes:  Negative for pain.   Respiratory:  Negative for cough and shortness of breath.    Cardiovascular:  Negative for chest pain, palpitations and leg swelling.   Gastrointestinal:  Negative for abdominal pain, constipation, diarrhea, nausea and vomiting.   Genitourinary:  Negative for difficulty urinating, flank pain, frequency and urgency.   Musculoskeletal:  Negative for gait problem, joint swelling and myalgias.   Skin:  Negative for color change.   Neurological:  Negative for dizziness, weakness, light-headedness and headaches.   Psychiatric/Behavioral:  Negative for sleep disturbance. The patient is not nervous/anxious.    All other systems reviewed and are negative.    Medications Ordered Prior to Encounter[1]     Objective   /74 (BP Location: Left arm, Patient Position: Sitting, Cuff Size: Standard)   Pulse 64   Temp 98.4 °F (36.9 °C) (Tympanic)   Resp 15   Ht 5' 3.5\" (1.613 m)   Wt 51.9 kg (114 lb 6.4 oz)   LMP 06/20/2025   SpO2 100%   BMI 19.95 kg/m²     Physical Exam  Vitals and nursing note reviewed.   Constitutional:       General: She is awake. She is not in acute distress.     Appearance: Normal appearance. She is well-developed.   HENT:      Head: Normocephalic and atraumatic.      Nose: Nose normal.      Mouth/Throat:      Mouth: Mucous membranes are moist.     Eyes:      Conjunctiva/sclera: Conjunctivae normal.       Cardiovascular:      Rate and Rhythm: Normal rate and regular rhythm.      Pulses: Normal " pulses.      Heart sounds: Normal heart sounds. No murmur heard.  Pulmonary:      Effort: Pulmonary effort is normal. No respiratory distress.      Breath sounds: Normal breath sounds.   Abdominal:      General: Bowel sounds are normal.      Palpations: Abdomen is soft.      Tenderness: There is no abdominal tenderness.     Musculoskeletal:      Cervical back: Neck supple.      Right lower leg: No edema.      Left lower leg: No edema.     Skin:     General: Skin is warm and dry.     Neurological:      Mental Status: She is alert and oriented to person, place, and time.     Psychiatric:         Attention and Perception: Attention normal.         Mood and Affect: Mood normal.         Speech: Speech normal.         Behavior: Behavior normal. Behavior is cooperative.         Thought Content: Thought content normal.         Cognition and Memory: Cognition normal.         Judgment: Judgment normal.       Administrative Statements   I have spent a total time of 40 minutes in caring for this patient on the day of the visit/encounter including Diagnostic results, Prognosis, Risks and benefits of tx options, Instructions for management, Patient and family education, Importance of tx compliance, Risk factor reductions, Impressions, Counseling / Coordination of care, Documenting in the medical record, Reviewing/placing orders in the medical record (including tests, medications, and/or procedures), and Obtaining or reviewing history  .         [1]   Current Outpatient Medications on File Prior to Visit   Medication Sig Dispense Refill    fexofenadine (ALLEGRA) 60 MG tablet Take 60 mg by mouth every morning      [DISCONTINUED] meloxicam (Mobic) 15 mg tablet Take 1 tablet (15 mg total) by mouth daily for 28 days (Patient not taking: Reported on 7/11/2024) 28 tablet 0    [DISCONTINUED] ondansetron (ZOFRAN) 4 mg tablet Take 1 tablet (4 mg total) by mouth every 8 (eight) hours as needed for nausea or vomiting (Patient not taking:  Reported on 10/9/2024) 10 tablet 0    [DISCONTINUED] oxyCODONE (Roxicodone) 5 immediate release tablet Take 1 tablet (5 mg total) by mouth every 4 (four) hours as needed for severe pain Max Daily Amount: 30 mg (Patient not taking: Reported on 7/11/2024) 15 tablet 0     No current facility-administered medications on file prior to visit.

## (undated) DEVICE — SUT PROLENE 0 CT-1 30 IN 8424H

## (undated) DEVICE — CANNULA 7 X70MM THRD SEAL SIDE PORT

## (undated) DEVICE — GLOVE INDICATOR PI UNDERGLOVE SZ 8 BLUE

## (undated) DEVICE — Device

## (undated) DEVICE — GLOVE INDICATOR PI UNDERGLOVE SZ 6.5 BLUE

## (undated) DEVICE — GLOVE SRG BIOGEL 6.5

## (undated) DEVICE — ALL PURPOSE SPONGES,NON-WOVEN, 4 PLY: Brand: CURITY

## (undated) DEVICE — ABDOMINAL PAD: Brand: DERMACEA

## (undated) DEVICE — GLOVE SRG BIOGEL 7.5

## (undated) DEVICE — TUBING ARTHROSCOPY REDUCE PATIENT

## (undated) DEVICE — POSITIONER TRIMANO LIMB BEACH CHAIR

## (undated) DEVICE — 3M™ IOBAN™ 2 ANTIMICROBIAL INCISE DRAPE 6651EZ: Brand: IOBAN™ 2

## (undated) DEVICE — PACK PBDS SHOULDER ARTHROSCOPY RF

## (undated) DEVICE — STOCKINETTE,IMPERVIOUS,12X48,STERILE: Brand: MEDLINE

## (undated) DEVICE — BLADE SHAVER TORPEDO 4MM 13CM  COOLCUT

## (undated) DEVICE — SUTURE LASSO CRESENT 1.8MM

## (undated) DEVICE — SCD SEQUENTIAL COMPRESSION COMFORT SLEEVE MEDIUM KNEE LENGTH: Brand: KENDALL SCD

## (undated) DEVICE — POSITIONER HEAD DISP STRL

## (undated) DEVICE — 4-PORT MANIFOLD: Brand: NEPTUNE 2

## (undated) DEVICE — GLOVE SRG BIOGEL 8

## (undated) DEVICE — KIT DISP FIBERTAK CURVED SPEAR

## (undated) DEVICE — 3M™ STERI-DRAPE™ U-DRAPE 1015: Brand: STERI-DRAPE™

## (undated) DEVICE — OCCLUSIVE GAUZE STRIP,3% BISMUTH TRIBROMOPHENATE IN PETROLATUM BLEND: Brand: XEROFORM

## (undated) DEVICE — NEEDLE SPINAL 18G X 3IN DISP

## (undated) DEVICE — PUDDLE VAC

## (undated) DEVICE — SUT ETHILON 4-0 PS-2 18 IN 1667H

## (undated) DEVICE — CHLORAPREP HI-LITE 26ML ORANGE

## (undated) DEVICE — CANNULA 8.25 X 90MM LOPRFL SLFRET

## (undated) DEVICE — MAT FLOOR STEP DRI 24 X 36 IN N-STRL

## (undated) DEVICE — CANNULA DISP 8.25 X 70MM W/SEAL SIDE PORT THRD

## (undated) DEVICE — MEDI-VAC NON-CONDUCTIVE SUCTION TUBING 6MM X 1.8M (6FT.) L: Brand: CARDINAL HEALTH